# Patient Record
Sex: FEMALE | Race: WHITE | NOT HISPANIC OR LATINO | URBAN - METROPOLITAN AREA
[De-identification: names, ages, dates, MRNs, and addresses within clinical notes are randomized per-mention and may not be internally consistent; named-entity substitution may affect disease eponyms.]

---

## 2017-04-08 ENCOUNTER — INPATIENT (INPATIENT)
Facility: HOSPITAL | Age: 72
LOS: 2 days | Discharge: NURSING, REHAB OR LONG-TERM CARE | End: 2017-04-11
Payer: MEDICARE

## 2017-04-08 PROCEDURE — 73562 X-RAY EXAM OF KNEE 3: CPT | Mod: 26,LT

## 2017-04-08 PROCEDURE — 99285 EMERGENCY DEPT VISIT HI MDM: CPT

## 2017-04-08 PROCEDURE — 73552 X-RAY EXAM OF FEMUR 2/>: CPT | Mod: 26,LT

## 2017-04-08 PROCEDURE — 72170 X-RAY EXAM OF PELVIS: CPT | Mod: 26

## 2017-04-08 PROCEDURE — 73503 X-RAY EXAM HIP UNI 4/> VIEWS: CPT | Mod: 26,LT

## 2017-04-08 PROCEDURE — 71020: CPT | Mod: 26

## 2017-04-09 ENCOUNTER — OUTPATIENT (OUTPATIENT)
Dept: OUTPATIENT SERVICES | Facility: HOSPITAL | Age: 72
LOS: 1 days | End: 2017-04-09

## 2017-04-09 PROCEDURE — 73501 X-RAY EXAM HIP UNI 1 VIEW: CPT | Mod: 26,LT

## 2017-04-09 PROCEDURE — 99223 1ST HOSP IP/OBS HIGH 75: CPT

## 2017-04-10 ENCOUNTER — OUTPATIENT (OUTPATIENT)
Dept: OUTPATIENT SERVICES | Facility: HOSPITAL | Age: 72
LOS: 1 days | End: 2017-04-10

## 2017-04-10 PROCEDURE — 99232 SBSQ HOSP IP/OBS MODERATE 35: CPT

## 2017-04-11 ENCOUNTER — OUTPATIENT (OUTPATIENT)
Dept: OUTPATIENT SERVICES | Facility: HOSPITAL | Age: 72
LOS: 1 days | End: 2017-04-11

## 2017-04-11 ENCOUNTER — INPATIENT (INPATIENT)
Facility: HOSPITAL | Age: 72
LOS: 28 days | Discharge: ANOTHER HOSPITAL | End: 2017-05-10
Payer: MEDICARE

## 2017-04-25 PROCEDURE — 73503 X-RAY EXAM HIP UNI 4/> VIEWS: CPT | Mod: 26,LT

## 2017-05-06 PROCEDURE — 73701 CT LOWER EXTREMITY W/DYE: CPT | Mod: 26,LT

## 2017-05-10 ENCOUNTER — INPATIENT (INPATIENT)
Facility: HOSPITAL | Age: 72
LOS: 1 days | Discharge: NURSING, REHAB OR LONG-TERM CARE | End: 2017-05-12
Payer: MEDICARE

## 2017-05-12 ENCOUNTER — INPATIENT (INPATIENT)
Facility: HOSPITAL | Age: 72
LOS: 34 days | Discharge: HOME | End: 2017-06-16
Payer: MEDICARE

## 2017-05-12 PROCEDURE — 36569 INSJ PICC 5 YR+ W/O IMAGING: CPT

## 2017-05-12 PROCEDURE — 76937 US GUIDE VASCULAR ACCESS: CPT | Mod: 26

## 2017-05-12 PROCEDURE — 77001 FLUOROGUIDE FOR VEIN DEVICE: CPT | Mod: 26

## 2017-05-30 PROCEDURE — 73700 CT LOWER EXTREMITY W/O DYE: CPT | Mod: 26,LT

## 2017-06-02 PROCEDURE — 93971 EXTREMITY STUDY: CPT | Mod: 26,LT

## 2017-06-07 PROCEDURE — 73503 X-RAY EXAM HIP UNI 4/> VIEWS: CPT | Mod: 26,LT

## 2017-06-09 ENCOUNTER — OUTPATIENT (OUTPATIENT)
Dept: OUTPATIENT SERVICES | Facility: HOSPITAL | Age: 72
LOS: 1 days | End: 2017-06-09
Payer: MEDICARE

## 2017-06-09 PROCEDURE — 36569 INSJ PICC 5 YR+ W/O IMAGING: CPT

## 2017-06-09 PROCEDURE — 76937 US GUIDE VASCULAR ACCESS: CPT | Mod: 26

## 2017-06-15 PROCEDURE — 76937 US GUIDE VASCULAR ACCESS: CPT | Mod: 26

## 2017-06-15 PROCEDURE — 36569 INSJ PICC 5 YR+ W/O IMAGING: CPT

## 2017-07-06 ENCOUNTER — APPOINTMENT (OUTPATIENT)
Dept: CARDIOLOGY | Facility: CLINIC | Age: 72
End: 2017-07-06

## 2017-07-14 ENCOUNTER — EMERGENCY (EMERGENCY)
Facility: HOSPITAL | Age: 72
LOS: 1 days | End: 2017-07-14
Payer: MEDICARE

## 2017-07-14 PROCEDURE — 99284 EMERGENCY DEPT VISIT MOD MDM: CPT

## 2017-07-14 PROCEDURE — 76830 TRANSVAGINAL US NON-OB: CPT | Mod: 26

## 2017-07-14 PROCEDURE — 76856 US EXAM PELVIC COMPLETE: CPT | Mod: 26

## 2018-07-31 ENCOUNTER — EMERGENCY (EMERGENCY)
Facility: HOSPITAL | Age: 73
LOS: 1 days | End: 2018-07-31
Payer: MEDICARE

## 2018-07-31 PROCEDURE — 73140 X-RAY EXAM OF FINGER(S): CPT | Mod: 26,RT

## 2018-07-31 PROCEDURE — 99283 EMERGENCY DEPT VISIT LOW MDM: CPT

## 2018-08-23 ENCOUNTER — OUTPATIENT (OUTPATIENT)
Dept: OUTPATIENT SERVICES | Facility: HOSPITAL | Age: 73
LOS: 1 days | End: 2018-08-23

## 2020-08-17 ENCOUNTER — APPOINTMENT (OUTPATIENT)
Dept: RADIOLOGY | Facility: CLINIC | Age: 75
End: 2020-08-17

## 2020-08-17 ENCOUNTER — APPOINTMENT (OUTPATIENT)
Dept: MAMMOGRAPHY | Facility: CLINIC | Age: 75
End: 2020-08-17
Payer: MEDICARE

## 2020-08-17 PROCEDURE — 77067 SCR MAMMO BI INCL CAD: CPT

## 2020-08-17 PROCEDURE — 77080 DXA BONE DENSITY AXIAL: CPT

## 2020-08-17 PROCEDURE — 77063 BREAST TOMOSYNTHESIS BI: CPT

## 2020-08-17 PROCEDURE — 72100 X-RAY EXAM L-S SPINE 2/3 VWS: CPT

## 2020-08-20 ENCOUNTER — TRANSCRIPTION ENCOUNTER (OUTPATIENT)
Age: 75
End: 2020-08-20

## 2021-06-09 ENCOUNTER — APPOINTMENT (OUTPATIENT)
Dept: RADIOLOGY | Facility: CLINIC | Age: 76
End: 2021-06-09
Payer: MEDICARE

## 2021-06-09 PROCEDURE — 71046 X-RAY EXAM CHEST 2 VIEWS: CPT

## 2021-10-04 ENCOUNTER — APPOINTMENT (OUTPATIENT)
Dept: OPHTHALMOLOGY | Facility: CLINIC | Age: 76
End: 2021-10-04
Payer: MEDICARE

## 2021-10-04 ENCOUNTER — NON-APPOINTMENT (OUTPATIENT)
Age: 76
End: 2021-10-04

## 2021-10-04 PROCEDURE — 92014 COMPRE OPH EXAM EST PT 1/>: CPT

## 2022-05-20 ENCOUNTER — APPOINTMENT (OUTPATIENT)
Dept: MAMMOGRAPHY | Facility: CLINIC | Age: 77
End: 2022-05-20
Payer: MEDICARE

## 2022-05-20 ENCOUNTER — APPOINTMENT (OUTPATIENT)
Dept: MRI IMAGING | Facility: CLINIC | Age: 77
End: 2022-05-20
Payer: MEDICARE

## 2022-05-20 PROCEDURE — 77067 SCR MAMMO BI INCL CAD: CPT

## 2022-05-20 PROCEDURE — 77063 BREAST TOMOSYNTHESIS BI: CPT

## 2022-05-20 PROCEDURE — 72148 MRI LUMBAR SPINE W/O DYE: CPT | Mod: MH

## 2022-09-19 ENCOUNTER — APPOINTMENT (OUTPATIENT)
Dept: ULTRASOUND IMAGING | Facility: CLINIC | Age: 77
End: 2022-09-19

## 2022-09-19 PROCEDURE — 76770 US EXAM ABDO BACK WALL COMP: CPT

## 2022-10-03 ENCOUNTER — APPOINTMENT (OUTPATIENT)
Dept: OPHTHALMOLOGY | Facility: CLINIC | Age: 77
End: 2022-10-03

## 2022-10-28 ENCOUNTER — APPOINTMENT (OUTPATIENT)
Dept: OPHTHALMOLOGY | Facility: CLINIC | Age: 77
End: 2022-10-28

## 2022-10-28 ENCOUNTER — NON-APPOINTMENT (OUTPATIENT)
Age: 77
End: 2022-10-28

## 2022-10-28 PROCEDURE — 92014 COMPRE OPH EXAM EST PT 1/>: CPT

## 2023-01-25 ENCOUNTER — INPATIENT (INPATIENT)
Facility: HOSPITAL | Age: 78
LOS: 11 days | Discharge: NURSING, REHAB OR LONG-TERM CARE | DRG: 377 | End: 2023-02-06
Attending: STUDENT IN AN ORGANIZED HEALTH CARE EDUCATION/TRAINING PROGRAM | Admitting: INTERNAL MEDICINE
Payer: MEDICARE

## 2023-01-25 VITALS
WEIGHT: 204.37 LBS | OXYGEN SATURATION: 98 % | DIASTOLIC BLOOD PRESSURE: 68 MMHG | HEART RATE: 70 BPM | RESPIRATION RATE: 30 BRPM | HEIGHT: 68 IN | SYSTOLIC BLOOD PRESSURE: 150 MMHG

## 2023-01-25 DIAGNOSIS — K92.2 GASTROINTESTINAL HEMORRHAGE, UNSPECIFIED: ICD-10-CM

## 2023-01-25 LAB
ALBUMIN SERPL ELPH-MCNC: 3.3 G/DL — SIGNIFICANT CHANGE UP (ref 3.3–5.2)
ALP SERPL-CCNC: 74 U/L — SIGNIFICANT CHANGE UP (ref 40–120)
ALT FLD-CCNC: 11 U/L — SIGNIFICANT CHANGE UP
ANION GAP SERPL CALC-SCNC: 11 MMOL/L — SIGNIFICANT CHANGE UP (ref 5–17)
APTT BLD: 24.3 SEC — LOW (ref 27.5–35.5)
AST SERPL-CCNC: 23 U/L — SIGNIFICANT CHANGE UP
BILIRUB SERPL-MCNC: 0.7 MG/DL — SIGNIFICANT CHANGE UP (ref 0.4–2)
BLD GP AB SCN SERPL QL: SIGNIFICANT CHANGE UP
BUN SERPL-MCNC: 34.5 MG/DL — HIGH (ref 8–20)
CALCIUM SERPL-MCNC: 8.3 MG/DL — LOW (ref 8.4–10.5)
CHLORIDE SERPL-SCNC: 112 MMOL/L — HIGH (ref 96–108)
CO2 SERPL-SCNC: 17 MMOL/L — LOW (ref 22–29)
CREAT SERPL-MCNC: 0.96 MG/DL — SIGNIFICANT CHANGE UP (ref 0.5–1.3)
EGFR: 61 ML/MIN/1.73M2 — SIGNIFICANT CHANGE UP
GAS PNL BLDA: SIGNIFICANT CHANGE UP
GLUCOSE BLDC GLUCOMTR-MCNC: 115 MG/DL — HIGH (ref 70–99)
GLUCOSE SERPL-MCNC: 137 MG/DL — HIGH (ref 70–99)
HCT VFR BLD CALC: 40.5 % — SIGNIFICANT CHANGE UP (ref 34.5–45)
HCT VFR BLD CALC: 42 % — SIGNIFICANT CHANGE UP (ref 34.5–45)
HGB BLD-MCNC: 13.4 G/DL — SIGNIFICANT CHANGE UP (ref 11.5–15.5)
HGB BLD-MCNC: 13.7 G/DL — SIGNIFICANT CHANGE UP (ref 11.5–15.5)
INR BLD: 1.01 RATIO — SIGNIFICANT CHANGE UP (ref 0.88–1.16)
MAGNESIUM SERPL-MCNC: 1.9 MG/DL — SIGNIFICANT CHANGE UP (ref 1.8–2.6)
MCHC RBC-ENTMCNC: 28.9 PG — SIGNIFICANT CHANGE UP (ref 27–34)
MCHC RBC-ENTMCNC: 29.3 PG — SIGNIFICANT CHANGE UP (ref 27–34)
MCHC RBC-ENTMCNC: 32.6 GM/DL — SIGNIFICANT CHANGE UP (ref 32–36)
MCHC RBC-ENTMCNC: 33.1 GM/DL — SIGNIFICANT CHANGE UP (ref 32–36)
MCV RBC AUTO: 88.4 FL — SIGNIFICANT CHANGE UP (ref 80–100)
MCV RBC AUTO: 88.6 FL — SIGNIFICANT CHANGE UP (ref 80–100)
MRSA PCR RESULT.: SIGNIFICANT CHANGE UP
PHOSPHATE SERPL-MCNC: 3.5 MG/DL — SIGNIFICANT CHANGE UP (ref 2.4–4.7)
PLATELET # BLD AUTO: 156 K/UL — SIGNIFICANT CHANGE UP (ref 150–400)
PLATELET # BLD AUTO: 191 K/UL — SIGNIFICANT CHANGE UP (ref 150–400)
POTASSIUM SERPL-MCNC: 5.1 MMOL/L — SIGNIFICANT CHANGE UP (ref 3.5–5.3)
POTASSIUM SERPL-SCNC: 5.1 MMOL/L — SIGNIFICANT CHANGE UP (ref 3.5–5.3)
PROT SERPL-MCNC: 5.7 G/DL — LOW (ref 6.6–8.7)
PROTHROM AB SERPL-ACNC: 11.7 SEC — SIGNIFICANT CHANGE UP (ref 10.5–13.4)
RBC # BLD: 4.58 M/UL — SIGNIFICANT CHANGE UP (ref 3.8–5.2)
RBC # BLD: 4.74 M/UL — SIGNIFICANT CHANGE UP (ref 3.8–5.2)
RBC # FLD: 15.1 % — HIGH (ref 10.3–14.5)
RBC # FLD: 15.8 % — HIGH (ref 10.3–14.5)
S AUREUS DNA NOSE QL NAA+PROBE: DETECTED
SODIUM SERPL-SCNC: 140 MMOL/L — SIGNIFICANT CHANGE UP (ref 135–145)
WBC # BLD: 12.54 K/UL — HIGH (ref 3.8–10.5)
WBC # BLD: 9.77 K/UL — SIGNIFICANT CHANGE UP (ref 3.8–10.5)
WBC # FLD AUTO: 12.54 K/UL — HIGH (ref 3.8–10.5)
WBC # FLD AUTO: 9.77 K/UL — SIGNIFICANT CHANGE UP (ref 3.8–10.5)

## 2023-01-25 PROCEDURE — 93010 ELECTROCARDIOGRAM REPORT: CPT

## 2023-01-25 PROCEDURE — 71045 X-RAY EXAM CHEST 1 VIEW: CPT | Mod: 26

## 2023-01-25 PROCEDURE — 88305 TISSUE EXAM BY PATHOLOGIST: CPT | Mod: 26

## 2023-01-25 PROCEDURE — 88312 SPECIAL STAINS GROUP 1: CPT | Mod: 26

## 2023-01-25 PROCEDURE — 74174 CTA ABD&PLVS W/CONTRAST: CPT | Mod: 26

## 2023-01-25 RX ORDER — ALBUTEROL 90 UG/1
2 AEROSOL, METERED ORAL EVERY 6 HOURS
Refills: 0 | Status: DISCONTINUED | OUTPATIENT
Start: 2023-01-25 | End: 2023-02-06

## 2023-01-25 RX ORDER — PIPERACILLIN AND TAZOBACTAM 4; .5 G/20ML; G/20ML
3.38 INJECTION, POWDER, LYOPHILIZED, FOR SOLUTION INTRAVENOUS EVERY 8 HOURS
Refills: 0 | Status: DISCONTINUED | OUTPATIENT
Start: 2023-01-26 | End: 2023-01-30

## 2023-01-25 RX ORDER — PREGABALIN 225 MG/1
1000 CAPSULE ORAL DAILY
Refills: 0 | Status: DISCONTINUED | OUTPATIENT
Start: 2023-01-25 | End: 2023-01-25

## 2023-01-25 RX ORDER — PHENYLEPHRINE HYDROCHLORIDE 10 MG/ML
0.3 INJECTION INTRAVENOUS
Qty: 40 | Refills: 0 | Status: DISCONTINUED | OUTPATIENT
Start: 2023-01-25 | End: 2023-01-26

## 2023-01-25 RX ORDER — PREGABALIN 225 MG/1
1000 CAPSULE ORAL DAILY
Refills: 0 | Status: DISCONTINUED | OUTPATIENT
Start: 2023-01-25 | End: 2023-01-26

## 2023-01-25 RX ORDER — ONDANSETRON 8 MG/1
4 TABLET, FILM COATED ORAL EVERY 4 HOURS
Refills: 0 | Status: DISCONTINUED | OUTPATIENT
Start: 2023-01-25 | End: 2023-02-06

## 2023-01-25 RX ORDER — CHLORHEXIDINE GLUCONATE 213 G/1000ML
15 SOLUTION TOPICAL EVERY 12 HOURS
Refills: 0 | Status: DISCONTINUED | OUTPATIENT
Start: 2023-01-25 | End: 2023-01-26

## 2023-01-25 RX ORDER — PIPERACILLIN AND TAZOBACTAM 4; .5 G/20ML; G/20ML
3.38 INJECTION, POWDER, LYOPHILIZED, FOR SOLUTION INTRAVENOUS ONCE
Refills: 0 | Status: COMPLETED | OUTPATIENT
Start: 2023-01-25 | End: 2023-01-25

## 2023-01-25 RX ORDER — PANTOPRAZOLE SODIUM 20 MG/1
8 TABLET, DELAYED RELEASE ORAL
Qty: 80 | Refills: 0 | Status: DISCONTINUED | OUTPATIENT
Start: 2023-01-25 | End: 2023-01-26

## 2023-01-25 RX ORDER — CHOLECALCIFEROL (VITAMIN D3) 125 MCG
5000 CAPSULE ORAL DAILY
Refills: 0 | Status: DISCONTINUED | OUTPATIENT
Start: 2023-01-25 | End: 2023-01-25

## 2023-01-25 RX ORDER — MIDAZOLAM HYDROCHLORIDE 1 MG/ML
2 INJECTION, SOLUTION INTRAMUSCULAR; INTRAVENOUS
Refills: 0 | Status: DISCONTINUED | OUTPATIENT
Start: 2023-01-25 | End: 2023-01-26

## 2023-01-25 RX ORDER — ATORVASTATIN CALCIUM 80 MG/1
40 TABLET, FILM COATED ORAL AT BEDTIME
Refills: 0 | Status: DISCONTINUED | OUTPATIENT
Start: 2023-01-25 | End: 2023-01-26

## 2023-01-25 RX ORDER — PROPOFOL 10 MG/ML
40 INJECTION, EMULSION INTRAVENOUS
Qty: 1000 | Refills: 0 | Status: DISCONTINUED | OUTPATIENT
Start: 2023-01-25 | End: 2023-01-26

## 2023-01-25 RX ORDER — DEXTROSE 50 % IN WATER 50 %
15 SYRINGE (ML) INTRAVENOUS ONCE
Refills: 0 | Status: DISCONTINUED | OUTPATIENT
Start: 2023-01-25 | End: 2023-02-06

## 2023-01-25 RX ORDER — INSULIN LISPRO 100/ML
VIAL (ML) SUBCUTANEOUS
Refills: 0 | Status: DISCONTINUED | OUTPATIENT
Start: 2023-01-25 | End: 2023-02-06

## 2023-01-25 RX ORDER — DEXTROSE 50 % IN WATER 50 %
25 SYRINGE (ML) INTRAVENOUS ONCE
Refills: 0 | Status: DISCONTINUED | OUTPATIENT
Start: 2023-01-25 | End: 2023-02-06

## 2023-01-25 RX ORDER — SODIUM CHLORIDE 9 MG/ML
1000 INJECTION, SOLUTION INTRAVENOUS
Refills: 0 | Status: DISCONTINUED | OUTPATIENT
Start: 2023-01-25 | End: 2023-02-06

## 2023-01-25 RX ORDER — ALLOPURINOL 300 MG
100 TABLET ORAL DAILY
Refills: 0 | Status: DISCONTINUED | OUTPATIENT
Start: 2023-01-25 | End: 2023-01-26

## 2023-01-25 RX ORDER — CLOTRIMAZOLE AND BETAMETHASONE DIPROPIONATE 10; .5 MG/G; MG/G
1 CREAM TOPICAL
Refills: 0 | Status: DISCONTINUED | OUTPATIENT
Start: 2023-01-25 | End: 2023-02-06

## 2023-01-25 RX ORDER — NOREPINEPHRINE BITARTRATE/D5W 8 MG/250ML
0.05 PLASTIC BAG, INJECTION (ML) INTRAVENOUS
Qty: 8 | Refills: 0 | Status: DISCONTINUED | OUTPATIENT
Start: 2023-01-25 | End: 2023-01-26

## 2023-01-25 RX ORDER — MONTELUKAST 4 MG/1
10 TABLET, CHEWABLE ORAL DAILY
Refills: 0 | Status: DISCONTINUED | OUTPATIENT
Start: 2023-01-25 | End: 2023-01-26

## 2023-01-25 RX ORDER — CHOLECALCIFEROL (VITAMIN D3) 125 MCG
2000 CAPSULE ORAL DAILY
Refills: 0 | Status: DISCONTINUED | OUTPATIENT
Start: 2023-01-25 | End: 2023-01-26

## 2023-01-25 RX ORDER — DEXTROSE 50 % IN WATER 50 %
12.5 SYRINGE (ML) INTRAVENOUS ONCE
Refills: 0 | Status: DISCONTINUED | OUTPATIENT
Start: 2023-01-25 | End: 2023-02-06

## 2023-01-25 RX ORDER — DEXMEDETOMIDINE HYDROCHLORIDE IN 0.9% SODIUM CHLORIDE 4 UG/ML
0.3 INJECTION INTRAVENOUS
Qty: 200 | Refills: 0 | Status: DISCONTINUED | OUTPATIENT
Start: 2023-01-25 | End: 2023-01-26

## 2023-01-25 RX ORDER — FENTANYL CITRATE 50 UG/ML
50 INJECTION INTRAVENOUS
Refills: 0 | Status: DISCONTINUED | OUTPATIENT
Start: 2023-01-25 | End: 2023-01-26

## 2023-01-25 RX ORDER — CHLORHEXIDINE GLUCONATE 213 G/1000ML
1 SOLUTION TOPICAL
Refills: 0 | Status: DISCONTINUED | OUTPATIENT
Start: 2023-01-25 | End: 2023-02-06

## 2023-01-25 RX ORDER — GLUCAGON INJECTION, SOLUTION 0.5 MG/.1ML
1 INJECTION, SOLUTION SUBCUTANEOUS ONCE
Refills: 0 | Status: DISCONTINUED | OUTPATIENT
Start: 2023-01-25 | End: 2023-02-06

## 2023-01-25 RX ORDER — LEVOTHYROXINE SODIUM 125 MCG
125 TABLET ORAL DAILY
Refills: 0 | Status: DISCONTINUED | OUTPATIENT
Start: 2023-01-25 | End: 2023-01-26

## 2023-01-25 RX ADMIN — PROPOFOL 24 MICROGRAM(S)/KG/MIN: 10 INJECTION, EMULSION INTRAVENOUS at 18:03

## 2023-01-25 RX ADMIN — Medication 100 MILLIGRAM(S): at 18:03

## 2023-01-25 RX ADMIN — PANTOPRAZOLE SODIUM 10 MG/HR: 20 TABLET, DELAYED RELEASE ORAL at 21:29

## 2023-01-25 RX ADMIN — PROPOFOL 24 MICROGRAM(S)/KG/MIN: 10 INJECTION, EMULSION INTRAVENOUS at 14:32

## 2023-01-25 RX ADMIN — PREGABALIN 1000 MICROGRAM(S): 225 CAPSULE ORAL at 18:03

## 2023-01-25 RX ADMIN — FENTANYL CITRATE 50 MICROGRAM(S): 50 INJECTION INTRAVENOUS at 14:57

## 2023-01-25 RX ADMIN — DEXMEDETOMIDINE HYDROCHLORIDE IN 0.9% SODIUM CHLORIDE 7.5 MICROGRAM(S)/KG/HR: 4 INJECTION INTRAVENOUS at 21:52

## 2023-01-25 RX ADMIN — PANTOPRAZOLE SODIUM 10 MG/HR: 20 TABLET, DELAYED RELEASE ORAL at 15:35

## 2023-01-25 RX ADMIN — Medication 2000 UNIT(S): at 18:33

## 2023-01-25 RX ADMIN — FENTANYL CITRATE 50 MICROGRAM(S): 50 INJECTION INTRAVENOUS at 14:32

## 2023-01-25 RX ADMIN — FENTANYL CITRATE 50 MICROGRAM(S): 50 INJECTION INTRAVENOUS at 02:13

## 2023-01-25 RX ADMIN — ATORVASTATIN CALCIUM 40 MILLIGRAM(S): 80 TABLET, FILM COATED ORAL at 21:29

## 2023-01-25 RX ADMIN — MONTELUKAST 10 MILLIGRAM(S): 4 TABLET, CHEWABLE ORAL at 18:03

## 2023-01-25 RX ADMIN — MIDAZOLAM HYDROCHLORIDE 2 MILLIGRAM(S): 1 INJECTION, SOLUTION INTRAMUSCULAR; INTRAVENOUS at 14:31

## 2023-01-25 RX ADMIN — PIPERACILLIN AND TAZOBACTAM 200 GRAM(S): 4; .5 INJECTION, POWDER, LYOPHILIZED, FOR SOLUTION INTRAVENOUS at 18:33

## 2023-01-25 RX ADMIN — PIPERACILLIN AND TAZOBACTAM 25 GRAM(S): 4; .5 INJECTION, POWDER, LYOPHILIZED, FOR SOLUTION INTRAVENOUS at 21:32

## 2023-01-25 RX ADMIN — Medication 125 MICROGRAM(S): at 18:03

## 2023-01-25 RX ADMIN — PROPOFOL 24 MICROGRAM(S)/KG/MIN: 10 INJECTION, EMULSION INTRAVENOUS at 22:39

## 2023-01-25 RX ADMIN — PHENYLEPHRINE HYDROCHLORIDE 11.3 MICROGRAM(S)/KG/MIN: 10 INJECTION INTRAVENOUS at 14:32

## 2023-01-25 RX ADMIN — FENTANYL CITRATE 50 MICROGRAM(S): 50 INJECTION INTRAVENOUS at 21:29

## 2023-01-25 RX ADMIN — CHLORHEXIDINE GLUCONATE 1 APPLICATION(S): 213 SOLUTION TOPICAL at 14:34

## 2023-01-25 RX ADMIN — CHLORHEXIDINE GLUCONATE 15 MILLILITER(S): 213 SOLUTION TOPICAL at 18:04

## 2023-01-25 NOTE — PATIENT PROFILE ADULT - FALL HARM RISK - HARM RISK INTERVENTIONS

## 2023-01-25 NOTE — H&P ADULT - NSHPPHYSICALEXAM_GEN_ALL_CORE
Constitutional - Obese female, intubated, sedated, NGT+, dominguez catheter+  HEENT - AT, NC, PERRL  CV - S1, S2, no gallops or rubs   Pulm - CTAB, no rhonchi or rales  GI - Obese, soft, nondistended, no rebound or guarding   Ext - Nontender, mild edema   Skin - Warm, pink, diffuse erythematous patches on abdomen, pannus, and B/L LEs   Neuro - Sedated on propofol gtt

## 2023-01-25 NOTE — H&P ADULT - HISTORY OF PRESENT ILLNESS
76 y/o female, FULL CODE, w/PMHx of Asthma, HTN, HLD, hypothyroid, DM, transferred from [facility] ICU for ABLA 2/2 LGIB. Patient noted to have multiple large episodes of hematochezia while in [facility] ED with drop in Hgb 8.4 <--13.9. Patient intubated for EGD at [facility] and found to have gastric ulcers not actively bleeding. Patient transferred to [facility] MICU for CTA Abd/Pel and possible IR embolization. Patient on full vent support, courtney gtt for BP support, Protonix gtt, and propofol gtt with IVP PRN fentanyl and versed for sedation/vent synchrony.  76 y/o female, FULL CODE, w/PMHx of Asthma, HTN, HLD, hypothyroid, DM, transferred [place] [facility] [place] for ABLA 2/2 LGIB. Patient presented complaining of several episodes of BRBPR and noted to have multiple large episodes of hematochezia while in [facility] ED with drop in Hgb 8.4 <--13.9. Patient admitted to ICU for further evaluation and received PRBC x 6, Platelets x 2, FFP x 2, and cryoprecipitate. Patient intubated for EGD at [facility] and found to have gastric ulcers not actively bleeding. Patient transferred to [facility] MICU for CTA Abd/Pel and possible IR embolization. Patient on full vent support, courtney gtt for BP support, Protonix gtt, and propofol gtt with IVP PRN fentanyl and versed for sedation/vent synchrony.

## 2023-01-25 NOTE — H&P ADULT - ASSESSMENT
Impression - 78 y/o female, FULL CODE, w/PMHx of Asthma, HTN, HLD, hypothyroid, DM, transferred from [facility] ICU for ABLA 2/2 LGIB. Patient presented complaining of several episodes of BRBPR and noted to have multiple large episodes of hematochezia while in [facility] ED with drop in Hgb 8.4 <--13.9. Patient admitted to ICU for further evaluation and received PRBC x 6, Platelets x 2, FFP x 2, and cryoprecipitate. Patient intubated for EGD at [facility] and found to have gastric ulcers not actively bleeding. Patient transferred to [facility] MICU for CTA Abd/Pel and possible IR embolization. Patient on full vent support, deni gtt for BP support, Protonix gtt, and propofol gtt with IVP PRN fentanyl and versed for sedation/vent synchrony.    Hemorrhagic shock   ABLA 2/2 LGIB  CORKY    Neuro - Propofol gtt, titrate to goal RASS 0/-1  CV - Actively titrating Deni gtt for goal MAP>65  Pulm - Full vent support, will maintain 6cc/kg of IBW, actively titrating FIO2 for SPO2>90%, full vent bundle, trend abg, CXR pending   GI - NPO, Protonix gtt, bloody stools noted on arrival, [facility] EGD +gastric ulcers, but no active source identified, CTA Abd/Pel pending, possible IR embolization    Renal -  Impression - 78 y/o female, FULL CODE, w/PMHx of Asthma, HTN, HLD, hypothyroid, DM, [place] [facility] [place] ABLA 2/2 LGIB. Patient presented complaining of several episodes of BRBPR and noted to have multiple large episodes of hematochezia while in [facility] [place] with drop in Hgb 8.4 <--13.9. Patient admitted to ICU for further evaluation and received PRBC x 6, Platelets x 2, FFP x 2, and cryoprecipitate. Patient intubated for [place]GD [place] [facility] and found to have gastric ulcers not actively bleeding. Patient transferred to [facility] MICU for CTA Abd/Pel and possible IR embolization. Patient on full vent support, deni gtt for BP support, Protonix gtt, and propofol gtt with IVP PRN fentanyl and versed for sedation/vent synchrony.    Hemorrhagic shock   ABLA 2/2 LGIB  CORKY    Neuro - Propofol gtt, titrate to goal RASS 0/-1  CV - Actively titrating Deni gtt for goal MAP>65  Pulm - Full vent support, will maintain 6cc/kg of IBW, actively titrating FIO2 for SPO2>90%, full vent bundle, trend abg, CXR pending, PRN albuterol   GI - NPO, Protonix gtt, bloody stools noted on arrival, [facility] [place] +gastric ulcers, but no active source identified, CTA Abd/Pel pending, possible IR embolization    Renal - Prerenal azotemia, dominguez catheter, strict I/Os, trend BUN/CTN, trend lytes, replete as needed, avoid nephrotoxic agents, renal dose meds  Heme - ABLA 2/2 LGIB, CTA Abd/Pel pending, s/p PRBCx6, Plateletsx2, FFPx2, PTA, trend cbc, transfuse as needed Impression - 76 y/o female, FULL CODE, w/PMHx of Asthma, HTN, HLD, hypothyroid, DM, transferred from [facility] [place] for ABLA 2/2 LGIB. Patient presented complaining of several episodes of BRBPR and noted to have multiple large episodes of hematochezia while in [facility] ED with drop in Hgb 8.4 <--13.9. Patient admitted to ICU for further evaluation and received PRBC x 6, Platelets x 2, FFP x 2, and cryoprecipitate. Patient intubated for EGD at [facility] and found to have gastric ulcers not actively bleeding. Patient transferred to [facility] MICU for CTA Abd/Pel and possible IR embolization. Patient on full vent support, deni gtt for BP support, Protonix gtt, and propofol gtt with IVP PRN fentanyl and versed for sedation/vent synchrony.    Hemorrhagic shock   ABLA 2/2 LGIB  CORKY    Neuro - Propofol gtt, titrate to goal RASS 0/-1  CV - Actively titrating Deni gtt for goal MAP>65, sinus amalia to high 40 possibly 2/2 to additional sedation given for vent synchrony will transition to Levo gtt if bradycardia persists  Pulm - Full vent support, will maintain 6cc/kg of IBW, actively titrating FIO2 for SPO2>90%, full vent bundle, trend abg, CXR pending, PRN albuterol   GI - NPO, Protonix gtt, bloody stools noted on arrival, [facility] EGD +gastric ulcers, but no active source identified, CTA Abd/Pel pending, possible IR embolization    Renal - Prerenal azotemia, dominguez catheter, strict I/Os, trend BUN/CTN, trend lytes, replete as needed, avoid nephrotoxic agents, renal dose meds  Heme - ABLA 2/2 LGIB, CTA Abd/Pel pending, s/p PRBCx6, Plateletsx2, FFPx2, PTA, trend cbc, transfuse as needed  Endocrine - ISS protocol, BG >180, A1c in AM, TSH in AM, keep euthyroid, continue daily synthroid   ID - Afebrile, trend leukocytosis, continue to monitor for signs of active infection       Critical Care time: 45 mins assessing presenting problems of acute illness that poses high probability of life threatening deterioration or end organ damage/dysfunction.  Medical decision making including initiating plan of care, reviewing data, reviewing radiology, direct patient bedside evaluation and interpretation of vital signs, any necessary ventilator management, discussion with multidisciplinary team, discussing goals of care with patient/family, all non inclusive of procedures Impression - 76 y/o female, FULL CODE, w/PMHx of Asthma, HTN, HLD, UC, hypothyroid, DM, transferred from [facility] ICU for ABLA 2/2 LGIB. Patient presented complaining of several episodes of BRBPR and noted to have multiple large episodes of hematochezia while in [facility] ED with drop in Hgb 8.4 <--13.9. Patient admitted to ICU for further evaluation and received PRBC x 6, Platelets x 2, FFP x 2, and cryoprecipitate. Patient intubated for EGD at [facility] and found to have gastric ulcers not actively bleeding. Patient transferred to [facility] MICU for CTA Abd/Pel and possible IR embolization. Patient on full vent support, deni gtt for BP support, Protonix gtt, and propofol gtt with IVP PRN fentanyl and versed for sedation/vent synchrony.    Hemorrhagic shock   ABLA 2/2 LGIB  CORKY    Neuro - Propofol gtt, titrate to goal RASS 0/-1  CV - Actively titrating Deni gtt for goal MAP>65, sinus amalia to high 40 possibly 2/2 to additional sedation given for vent synchrony will transition to Levo gtt if bradycardia persists  Pulm - Full vent support, will maintain 6cc/kg of IBW, actively titrating FIO2 for SPO2>90%, full vent bundle, trend abg, CXR pending, PRN albuterol   GI - NPO, Protonix gtt, bloody stools noted on arrival, [facility] EGD +gastric ulcers, but no active source identified, CTA Abd/Pel pending, possible IR embolization    Renal - Prerenal azotemia, dominguez catheter, strict I/Os, trend BUN/CTN, trend lytes, replete as needed, avoid nephrotoxic agents, renal dose meds  Heme - ABLA 2/2 LGIB, CTA Abd/Pel pending, s/p PRBCx6, Plateletsx2, FFPx2, PTA, trend cbc, transfuse as needed  Endocrine - ISS protocol, BG >180, A1c in AM, TSH in AM, keep euthyroid, continue daily synthroid   ID - Afebrile, trend leukocytosis, continue to monitor for signs of active infection       Critical Care time: 45 mins assessing presenting problems of acute illness that poses high probability of life threatening deterioration or end organ damage/dysfunction.  Medical decision making including initiating plan of care, reviewing data, reviewing radiology, direct patient bedside evaluation and interpretation of vital signs, any necessary ventilator management, discussion with multidisciplinary team, discussing goals of care with patient/family, all non inclusive of procedures Impression - 78 y/o female, FULL CODE, w/PMHx of Asthma, HTN, HLD, UC, hypothyroid, DM, [place] from [facility] [place] for ABLA 2/2 LGIB. Patient presented complaining of several episodes of BRBPR and noted to have multiple large episodes of hematochezia while in [facility] ED with drop in Hgb 8.4 <--13.9. Patient admitted to ICU for further evaluation and received PRBC x 6, Platelets x 2, FFP x 2, and cryoprecipitate. Patient intubated for EGD at [facility] and found to have gastric ulcers not actively bleeding. Patient transferred to [facility] MICU for CTA Abd/Pel and possible IR embolization. Patient on full vent support, deni gtt for BP support, Protonix gtt, and propofol gtt with IVP PRN fentanyl and versed for sedation/vent synchrony.    Hemorrhagic shock   ABLA 2/2 LGIB  CORKY    Neuro - Propofol gtt, titrate to goal RASS 0/-1  CV - Actively titrating Deni gtt for goal MAP>65, sinus amalia to high 40 possibly 2/2 to additional sedation given for vent synchrony will transition to Levo gtt if bradycardia persists, HOLD Lisinopril 40mgQD, continue statin   Pulm - Full vent support, will maintain 6cc/kg of IBW, actively titrating FIO2 for SPO2>90%, full vent bundle, trend abg, CXR pending, PRN albuterol, daily Singulair    GI - NPO, Protonix gtt, bloody stools noted on arrival, [facility] EGD +gastric ulcers, but no active source identified, CTA Abd/Pel pending, possible IR embolization    Renal - Prerenal azotemia, dominguez catheter, strict I/Os, trend BUN/CTN, trend lytes, replete as needed, avoid nephrotoxic agents, renal dose meds, HOLD Torsemide 20mg QD  Heme - ABLA 2/2 LGIB, CTA Abd/Pel pending, s/p PRBCx6, Plateletsx2, FFPx2, PTA, trend cbc, transfuse as needed, HOLD ASA 81   Endocrine - ISS protocol, BG >180, A1c in AM, TSH in AM, keep euthyroid, continue daily synthroid   ID - Afebrile, trend leukocytosis, continue to monitor for signs of active infection       Critical Care time: 45 mins assessing presenting problems of acute illness that poses high probability of life threatening deterioration or end organ damage/dysfunction.  Medical decision making including initiating plan of care, reviewing data, reviewing radiology, direct patient bedside evaluation and interpretation of vital signs, any necessary ventilator management, discussion with multidisciplinary team, discussing goals of care with patient/family, all non inclusive of procedures Impression - 76 y/o female, FULL CODE, w/PMHx of Asthma, HTN, HLD, UC, hypothyroid, DM, transferred from [facility] ICU for ABLA 2/2 LGIB. Patient presented complaining of several episodes of BRBPR and noted to have multiple large episodes of hematochezia while in [facility] ED with drop in Hgb 8.4 <--13.9. Patient admitted to ICU for further evaluation and received PRBC x 6, Platelets x 2, FFP x 2, and cryoprecipitate. Patient intubated for EGD at [facility] and found to have gastric ulcers not actively bleeding. Patient transferred to [facility] MICU for CTA Abd/Pel and possible IR embolization. Patient on full vent support, deni gtt for BP support, Protonix gtt, and propofol gtt with IVP PRN fentanyl and versed for sedation/vent synchrony.    Hemorrhagic shock   ABLA 2/2 LGIB  CORKY    Neuro - Propofol gtt, titrate to goal RASS 0/-1  CV - Actively titrating Deni gtt for goal MAP>65, sinus amalia to high 40 possibly 2/2 to additional sedation given for vent synchrony will transition to Levo gtt if bradycardia persists, HOLD Lisinopril 40mgQD, continue statin   Pulm - Full vent support, will maintain 6cc/kg of IBW, actively titrating FIO2 for SPO2>90%, full vent bundle, trend abg, daily SAT/SBT, CXR pending, PRN albuterol, daily Singulair    GI - NPO, Protonix gtt, bloody stools noted on arrival, [facility] EGD +gastric ulcers, but no active source identified, CTA Abd/Pel pending, possible IR embolization    Renal - Prerenal azotemia, dominguez catheter, strict I/Os, trend BUN/CTN, trend lytes, replete as needed, avoid nephrotoxic agents, renal dose meds, HOLD Torsemide 20mg QD  Heme - ABLA 2/2 LGIB, CTA Abd/Pel pending, s/p PRBCx6, Plateletsx2, FFPx2, PTA, trend cbc, transfuse as needed, HOLD ASA 81   Endocrine - ISS protocol, BG >180, A1c in AM, TSH in AM, keep euthyroid, continue daily synthroid   ID - Afebrile, trend leukocytosis, continue to monitor for signs of active infection       Critical Care time: 45 mins assessing presenting problems of acute illness that poses high probability of life threatening deterioration or end organ damage/dysfunction.  Medical decision making including initiating plan of care, reviewing data, reviewing radiology, direct patient bedside evaluation and interpretation of vital signs, any necessary ventilator management, discussion with multidisciplinary team, discussing goals of care with patient/family, all non inclusive of procedures

## 2023-01-25 NOTE — H&P ADULT - NS PANP COMMENT GEN_ALL_CORE FT
Agree with above.  CTA abdomen performed, no active bleeding.  Will monitor Hb overnight.  Breathing trial in AM.  Family updated.

## 2023-01-26 DIAGNOSIS — K92.2 GASTROINTESTINAL HEMORRHAGE, UNSPECIFIED: ICD-10-CM

## 2023-01-26 LAB
A1C WITH ESTIMATED AVERAGE GLUCOSE RESULT: 5.2 % — SIGNIFICANT CHANGE UP (ref 4–5.6)
ANION GAP SERPL CALC-SCNC: 11 MMOL/L — SIGNIFICANT CHANGE UP (ref 5–17)
BUN SERPL-MCNC: 22.9 MG/DL — HIGH (ref 8–20)
CALCIUM SERPL-MCNC: 9.1 MG/DL — SIGNIFICANT CHANGE UP (ref 8.4–10.5)
CHLORIDE SERPL-SCNC: 112 MMOL/L — HIGH (ref 96–108)
CO2 SERPL-SCNC: 21 MMOL/L — LOW (ref 22–29)
CREAT SERPL-MCNC: 0.9 MG/DL — SIGNIFICANT CHANGE UP (ref 0.5–1.3)
EGFR: 66 ML/MIN/1.73M2 — SIGNIFICANT CHANGE UP
ESTIMATED AVERAGE GLUCOSE: 103 MG/DL — SIGNIFICANT CHANGE UP (ref 68–114)
GAS PNL BLDA: SIGNIFICANT CHANGE UP
GLUCOSE BLDC GLUCOMTR-MCNC: 106 MG/DL — HIGH (ref 70–99)
GLUCOSE BLDC GLUCOMTR-MCNC: 116 MG/DL — HIGH (ref 70–99)
GLUCOSE BLDC GLUCOMTR-MCNC: 122 MG/DL — HIGH (ref 70–99)
GLUCOSE BLDC GLUCOMTR-MCNC: 127 MG/DL — HIGH (ref 70–99)
GLUCOSE BLDC GLUCOMTR-MCNC: 130 MG/DL — HIGH (ref 70–99)
GLUCOSE SERPL-MCNC: 120 MG/DL — HIGH (ref 70–99)
HCT VFR BLD CALC: 39.1 % — SIGNIFICANT CHANGE UP (ref 34.5–45)
HCV AB S/CO SERPL IA: 0.08 S/CO — SIGNIFICANT CHANGE UP (ref 0–0.99)
HCV AB SERPL-IMP: SIGNIFICANT CHANGE UP
HGB BLD-MCNC: 12.9 G/DL — SIGNIFICANT CHANGE UP (ref 11.5–15.5)
MAGNESIUM SERPL-MCNC: 2.1 MG/DL — SIGNIFICANT CHANGE UP (ref 1.6–2.6)
MCHC RBC-ENTMCNC: 29.1 PG — SIGNIFICANT CHANGE UP (ref 27–34)
MCHC RBC-ENTMCNC: 33 GM/DL — SIGNIFICANT CHANGE UP (ref 32–36)
MCV RBC AUTO: 88.1 FL — SIGNIFICANT CHANGE UP (ref 80–100)
PHOSPHATE SERPL-MCNC: 4.3 MG/DL — SIGNIFICANT CHANGE UP (ref 2.4–4.7)
PLATELET # BLD AUTO: 212 K/UL — SIGNIFICANT CHANGE UP (ref 150–400)
POTASSIUM SERPL-MCNC: 5.1 MMOL/L — SIGNIFICANT CHANGE UP (ref 3.5–5.3)
POTASSIUM SERPL-SCNC: 5.1 MMOL/L — SIGNIFICANT CHANGE UP (ref 3.5–5.3)
RBC # BLD: 4.44 M/UL — SIGNIFICANT CHANGE UP (ref 3.8–5.2)
RBC # FLD: 15.7 % — HIGH (ref 10.3–14.5)
SODIUM SERPL-SCNC: 144 MMOL/L — SIGNIFICANT CHANGE UP (ref 135–145)
TSH SERPL-MCNC: 0.34 UIU/ML — SIGNIFICANT CHANGE UP (ref 0.27–4.2)
WBC # BLD: 8.96 K/UL — SIGNIFICANT CHANGE UP (ref 3.8–10.5)
WBC # FLD AUTO: 8.96 K/UL — SIGNIFICANT CHANGE UP (ref 3.8–10.5)

## 2023-01-26 PROCEDURE — 99233 SBSQ HOSP IP/OBS HIGH 50: CPT

## 2023-01-26 PROCEDURE — 99223 1ST HOSP IP/OBS HIGH 75: CPT

## 2023-01-26 RX ORDER — LEVOTHYROXINE SODIUM 125 MCG
1 TABLET ORAL
Qty: 0 | Refills: 0 | DISCHARGE

## 2023-01-26 RX ORDER — DEXMEDETOMIDINE HYDROCHLORIDE IN 0.9% SODIUM CHLORIDE 4 UG/ML
0.2 INJECTION INTRAVENOUS
Qty: 200 | Refills: 0 | Status: DISCONTINUED | OUTPATIENT
Start: 2023-01-26 | End: 2023-01-26

## 2023-01-26 RX ORDER — LISINOPRIL 2.5 MG/1
1 TABLET ORAL
Qty: 0 | Refills: 0 | DISCHARGE

## 2023-01-26 RX ORDER — PANTOPRAZOLE SODIUM 20 MG/1
40 TABLET, DELAYED RELEASE ORAL
Refills: 0 | Status: DISCONTINUED | OUTPATIENT
Start: 2023-01-26 | End: 2023-01-28

## 2023-01-26 RX ORDER — BALSALAZIDE DISODIUM 750 MG/1
2250 CAPSULE ORAL
Refills: 0 | Status: DISCONTINUED | OUTPATIENT
Start: 2023-01-26 | End: 2023-02-06

## 2023-01-26 RX ORDER — FLUCONAZOLE 150 MG/1
100 TABLET ORAL DAILY
Refills: 0 | Status: COMPLETED | OUTPATIENT
Start: 2023-01-26 | End: 2023-01-31

## 2023-01-26 RX ORDER — LEVOTHYROXINE SODIUM 125 MCG
125 TABLET ORAL DAILY
Refills: 0 | Status: DISCONTINUED | OUTPATIENT
Start: 2023-01-26 | End: 2023-02-06

## 2023-01-26 RX ORDER — LISINOPRIL 2.5 MG/1
20 TABLET ORAL DAILY
Refills: 0 | Status: DISCONTINUED | OUTPATIENT
Start: 2023-01-26 | End: 2023-01-28

## 2023-01-26 RX ORDER — ALLOPURINOL 300 MG
1 TABLET ORAL
Qty: 0 | Refills: 0 | DISCHARGE

## 2023-01-26 RX ORDER — CHOLECALCIFEROL (VITAMIN D3) 125 MCG
2000 CAPSULE ORAL DAILY
Refills: 0 | Status: DISCONTINUED | OUTPATIENT
Start: 2023-01-26 | End: 2023-02-06

## 2023-01-26 RX ORDER — ATORVASTATIN CALCIUM 80 MG/1
40 TABLET, FILM COATED ORAL AT BEDTIME
Refills: 0 | Status: DISCONTINUED | OUTPATIENT
Start: 2023-01-26 | End: 2023-02-06

## 2023-01-26 RX ORDER — FLUCONAZOLE 150 MG/1
1 TABLET ORAL
Qty: 0 | Refills: 0 | DISCHARGE

## 2023-01-26 RX ORDER — ALBUTEROL 90 UG/1
2 AEROSOL, METERED ORAL
Qty: 0 | Refills: 0 | DISCHARGE

## 2023-01-26 RX ORDER — BALSALAZIDE DISODIUM 750 MG/1
3 CAPSULE ORAL
Qty: 0 | Refills: 0 | DISCHARGE

## 2023-01-26 RX ORDER — OXYCODONE AND ACETAMINOPHEN 5; 325 MG/1; MG/1
2 TABLET ORAL EVERY 6 HOURS
Refills: 0 | Status: DISCONTINUED | OUTPATIENT
Start: 2023-01-26 | End: 2023-01-26

## 2023-01-26 RX ORDER — EMPAGLIFLOZIN 10 MG/1
1 TABLET, FILM COATED ORAL
Qty: 0 | Refills: 0 | DISCHARGE

## 2023-01-26 RX ORDER — MONTELUKAST 4 MG/1
10 TABLET, CHEWABLE ORAL DAILY
Refills: 0 | Status: DISCONTINUED | OUTPATIENT
Start: 2023-01-26 | End: 2023-02-06

## 2023-01-26 RX ORDER — ALLOPURINOL 300 MG
100 TABLET ORAL DAILY
Refills: 0 | Status: DISCONTINUED | OUTPATIENT
Start: 2023-01-26 | End: 2023-02-06

## 2023-01-26 RX ORDER — MONTELUKAST 4 MG/1
1 TABLET, CHEWABLE ORAL
Qty: 0 | Refills: 0 | DISCHARGE

## 2023-01-26 RX ORDER — PREGABALIN 225 MG/1
1000 CAPSULE ORAL DAILY
Refills: 0 | Status: DISCONTINUED | OUTPATIENT
Start: 2023-01-26 | End: 2023-02-06

## 2023-01-26 RX ORDER — BALSALAZIDE DISODIUM 750 MG/1
2250 CAPSULE ORAL
Refills: 0 | Status: DISCONTINUED | OUTPATIENT
Start: 2023-01-26 | End: 2023-01-26

## 2023-01-26 RX ORDER — TRAMADOL HYDROCHLORIDE AND ACETAMINOPHEN 37.5; 325 MG/1; MG/1
1 TABLET ORAL
Qty: 0 | Refills: 0 | DISCHARGE

## 2023-01-26 RX ORDER — ATORVASTATIN CALCIUM 80 MG/1
1 TABLET, FILM COATED ORAL
Qty: 0 | Refills: 0 | DISCHARGE

## 2023-01-26 RX ORDER — PANTOPRAZOLE SODIUM 20 MG/1
40 TABLET, DELAYED RELEASE ORAL
Refills: 0 | Status: DISCONTINUED | OUTPATIENT
Start: 2023-01-26 | End: 2023-01-26

## 2023-01-26 RX ADMIN — CLOTRIMAZOLE AND BETAMETHASONE DIPROPIONATE 1 APPLICATION(S): 10; .5 CREAM TOPICAL at 06:26

## 2023-01-26 RX ADMIN — Medication 2000 UNIT(S): at 11:21

## 2023-01-26 RX ADMIN — PREGABALIN 1000 MICROGRAM(S): 225 CAPSULE ORAL at 11:22

## 2023-01-26 RX ADMIN — Medication 125 MICROGRAM(S): at 12:31

## 2023-01-26 RX ADMIN — LISINOPRIL 20 MILLIGRAM(S): 2.5 TABLET ORAL at 12:31

## 2023-01-26 RX ADMIN — CLOTRIMAZOLE AND BETAMETHASONE DIPROPIONATE 1 APPLICATION(S): 10; .5 CREAM TOPICAL at 17:05

## 2023-01-26 RX ADMIN — FLUCONAZOLE 100 MILLIGRAM(S): 150 TABLET ORAL at 12:31

## 2023-01-26 RX ADMIN — PROPOFOL 24 MICROGRAM(S)/KG/MIN: 10 INJECTION, EMULSION INTRAVENOUS at 04:09

## 2023-01-26 RX ADMIN — Medication 100 MILLIGRAM(S): at 11:22

## 2023-01-26 RX ADMIN — PIPERACILLIN AND TAZOBACTAM 25 GRAM(S): 4; .5 INJECTION, POWDER, LYOPHILIZED, FOR SOLUTION INTRAVENOUS at 21:39

## 2023-01-26 RX ADMIN — BALSALAZIDE DISODIUM 2250 MILLIGRAM(S): 750 CAPSULE ORAL at 18:33

## 2023-01-26 RX ADMIN — PIPERACILLIN AND TAZOBACTAM 25 GRAM(S): 4; .5 INJECTION, POWDER, LYOPHILIZED, FOR SOLUTION INTRAVENOUS at 13:11

## 2023-01-26 RX ADMIN — PIPERACILLIN AND TAZOBACTAM 25 GRAM(S): 4; .5 INJECTION, POWDER, LYOPHILIZED, FOR SOLUTION INTRAVENOUS at 06:25

## 2023-01-26 RX ADMIN — ATORVASTATIN CALCIUM 40 MILLIGRAM(S): 80 TABLET, FILM COATED ORAL at 21:39

## 2023-01-26 RX ADMIN — PANTOPRAZOLE SODIUM 40 MILLIGRAM(S): 20 TABLET, DELAYED RELEASE ORAL at 17:10

## 2023-01-26 RX ADMIN — PANTOPRAZOLE SODIUM 40 MILLIGRAM(S): 20 TABLET, DELAYED RELEASE ORAL at 06:26

## 2023-01-26 RX ADMIN — CHLORHEXIDINE GLUCONATE 1 APPLICATION(S): 213 SOLUTION TOPICAL at 06:25

## 2023-01-26 NOTE — PROGRESS NOTE ADULT - SUBJECTIVE AND OBJECTIVE BOX
ICu transfer    seen in ICU  feels well  wants to trial diet  dominguez to be removed  abd discomfort/gas cramps but no pain  ros negative     MEDICATIONS  (STANDING):  allopurinol 100 milliGRAM(s) Oral daily  atorvastatin 40 milliGRAM(s) Oral at bedtime  chlorhexidine 2% Cloths 1 Application(s) Topical <User Schedule>  cholecalciferol 2000 Unit(s) Oral daily  clotrimazole/betamethasone Cream 1 Application(s) Topical two times a day  cyanocobalamin 1000 MICROGram(s) Oral daily  dextrose 5%. 1000 milliLiter(s) (100 mL/Hr) IV Continuous <Continuous>  dextrose 5%. 1000 milliLiter(s) (50 mL/Hr) IV Continuous <Continuous>  dextrose 50% Injectable 25 Gram(s) IV Push once  dextrose 50% Injectable 12.5 Gram(s) IV Push once  dextrose 50% Injectable 25 Gram(s) IV Push once  fluconAZOLE   Tablet 100 milliGRAM(s) Oral daily  glucagon  Injectable 1 milliGRAM(s) IntraMuscular once  insulin lispro (ADMELOG) corrective regimen sliding scale   SubCutaneous three times a day before meals  levothyroxine 125 MICROGram(s) Oral daily  lisinopril 20 milliGRAM(s) Oral daily  montelukast 10 milliGRAM(s) Oral daily  pantoprazole    Tablet 40 milliGRAM(s) Oral two times a day  piperacillin/tazobactam IVPB.. 3.375 Gram(s) IV Intermittent every 8 hours    MEDICATIONS  (PRN):  albuterol    90 MICROgram(s) HFA Inhaler 2 Puff(s) Inhalation every 6 hours PRN Shortness of Breath and/or Wheezing  dextrose Oral Gel 15 Gram(s) Oral once PRN Blood Glucose LESS THAN 70 milliGRAM(s)/deciliter  ondansetron Injectable 4 milliGRAM(s) IV Push every 4 hours PRN Nausea and/or Vomiting  oxycodone    5 mG/acetaminophen 325 mG 2 Tablet(s) Oral every 6 hours PRN mod to severe pain      Allergies    No Known Allergies      Vital Signs Last 24 Hrs  T(C): 36.4 (26 Jan 2023 11:15), Max: 36.4 (25 Jan 2023 23:14)  T(F): 97.5 (26 Jan 2023 11:15), Max: 97.5 (25 Jan 2023 23:14)  HR: 71 (26 Jan 2023 13:00) (39 - 82)  BP: 140/80 (26 Jan 2023 13:00) (82/65 - 149/114)  BP(mean): 140 (26 Jan 2023 13:00) (62 - 140)  RR: 26 (26 Jan 2023 13:00) (12 - 26)  SpO2: 99% (26 Jan 2023 13:00) (99% - 100%)    Parameters below as of 26 Jan 2023 12:00  Patient On (Oxygen Delivery Method): room air        PHYSICAL EXAM:    GENERAL: NAD  CHEST/LUNG: Clear to ausculation bilaterally  HEART: Regular rate and rhythm; S1 S2  ABDOMEN: Soft, Nontender, ; Bowel sounds present  EXTREMITIES: no pitting edema   NERVOUS SYSTEM:  Alert & Oriented X3,  Motor Strength 5/5 B/L upper and lower extremities  PSYCH: normal mood, appropriate response.    LABS:                        12.9   8.96  )-----------( 212      ( 26 Jan 2023 05:22 )             39.1     01-26    144  |  112<H>  |  22.9<H>  ----------------------------<  120<H>  5.1   |  21.0<L>  |  0.90    Ca    9.1      26 Jan 2023 05:22  Phos  4.3     01-26  Mg     2.1     01-26    TPro  5.7<L>  /  Alb  3.3  /  TBili  0.7  /  DBili  x   /  AST  23  /  ALT  11  /  AlkPhos  74  01-25    PT/INR - ( 25 Jan 2023 14:30 )   PT: 11.7 sec;   INR: 1.01 ratio         PTT - ( 25 Jan 2023 14:30 )  PTT:24.3 sec      CAPILLARY BLOOD GLUCOSE      POCT Blood Glucose.: 106 mg/dL (26 Jan 2023 11:35)  POCT Blood Glucose.: 122 mg/dL (26 Jan 2023 01:11)  POCT Blood Glucose.: 115 mg/dL (25 Jan 2023 17:11)        RADIOLOGY & ADDITIONAL TESTS:

## 2023-01-26 NOTE — CHART NOTE - NSCHARTNOTEFT_GEN_A_CORE
78 y/o F with PMHx of HTN, HLD, hypothyroidism, DM type 2, asthma, Ulcerative Colitis, who initially presented to [facility] on 1/24 complaining of large episode of hematochezia. Upon arrival to ER, patient had two additional episodes with associated hypotension SBP 80's. Pt hgb 13.9 on presentation, and repeat 8.4. Pt received a total of 6u PRBCs, 2u platelets, 2u FFP, and cryo. CT abd/pelv with IV contrast demonstrated diverticulosis. Underwent EGD on 1/25 for which she was intubated, which revealed multiple clean based gastric and duodenal ulcers without stigmata of recent bleeding, which was felt to be unlikely to explain pt's GI bleeding. Pt was left intubated and transferred to [facility] for CTA abd/pelvis and possible IR intervention in the setting of suspected lower GI bleed. On initial arrival to [facility] MICU, patient had 1 episode of hematochezia, H/H has been stable x 3 without further need for blood transfusion. CTA abd/pelvis revealed acute uncomplicated sigmoid diverticulitis; no active GI hemorrhage was identified. Patient was evaluated by GI, diet advanced to clear liquids and is planned for outpatient f/u with her GI Dr. Whitaker for colonoscopy. Patient's family was advised to bring in her home Balsalazine for treatment of her UC. Diflucan was Rx as outpatient by her PCP for LE ringworm, which was resumed. As H/H has stabilized and she has had no further GI bleeding and not required further PRBC transfusion, patient is stable for transfer out of MICU to medical floors. Please continue to monitor for further bleeding and do not hesitate to reconsult should clinical condition change. Case endorsed to Dr. Escobedo.

## 2023-01-26 NOTE — PROGRESS NOTE ADULT - ASSESSMENT
78 y/o F with PMHx of HTN, HLD, hypothyroidism, DM type 2, and asthma admitted with:    Acute blood loss anemia, lower GI bleed, sigmoid diverticulitis, CORKY (Cr 1.4 at [facility]), metabolic acidosis    PLAN:  - CTA abd/pelvis negative for active GI hemorrhage.  - S/p 6u PRBCs, 2u platelets, 2u FFP, and cryo. Has not required any further blood transfusions.   - Trend hgb q6 hrs. Transfuse to maintain hgb > 7.  - Change protonix infusion to BID.  - NPO.  - Never required initiation of phenylephrine infusion.  - Actively titrating ventilator settings to maintain SpO2 > 92% and adequate minute ventilation.  - Precedex infusion was started with the intent to transition off propofol infusion, but her hemodynamic status did not tolerate this. Precedex was discontinued.  - SAT/SBT as tolerated.  - Zosyn for diverticulitis.  - Renal function has improved.  - Mechanical DVT prophylaxis with SCDs.  - Diabetes management.    Case discussed with ICU attending, Dr. Go.

## 2023-01-26 NOTE — CONSULT NOTE ADULT - PROBLEM SELECTOR RECOMMENDATION 9
Most likely diverticular bleed. CTA showed acute diverticulitis. Hematochezia now resolved.   Uper endoscopy at [facility] which showed several clan bases gastric and duodenal ulcer with no active bleeding or visible vessel.  Hemoglobin now stable, this morning 12.9 gm, No further episodes of hematochezia obvert night. Vital signs stable, off vasopressor.  Can start clear liquid diet  Continue Protonic BID   Need colonoscopy once the acute diverticulitis resolved. Patient with history of UC diagnosed 8 years ago, last colonoscopy 3.5 years ago  Patient can follow up with her gastroenterologist at [place] Most likely diverticular bleed. CTA showed acute diverticulitis. Hematochezia now resolved.   Upper endoscopy at [facility] which showed several clan bases gastric and duodenal ulcer with no active bleeding or visible vessel.  Hemoglobin now stable, this morning 12.9 gm, No further episodes of hematochezia obvert night. Vital signs stable, off vasopressor.  Can start clear liquid diet  Continue Protonic BID   Need colonoscopy once the acute diverticulitis resolved. Patient with history of UC diagnosed 8 years ago, last colonoscopy 3.5 years ago  Patient can follow up with her gastroenterologist at [place] Dr. Whitaker

## 2023-01-26 NOTE — PROGRESS NOTE ADULT - ASSESSMENT
78 y/o F with PMHx of HTN, HLD, hypothyroidism, DM type 2, and asthma who initially presented to [facility] on 1/24 complaining of large episode of hematochezia. Upon arrival to ER, had two additional episodes. Pt with hgb 13.9 on presentation. Overnight, had an acute drop in hgb (repeat 8.4). Pt received a total of 6u PRBCs, 2u platelets, 2u FFP, and cryo. Underwent EGD on 1/25 which was revealing of clean based gastric and duodenal ulcers, unlikely to explain pt's severe GI bleeding. Pt was transferred to [facility] for CTA abd/pelvis and possible IR intervention in the setting of suspected lower GI bleed. Pt remained intubated s/p EGD. CTA abd/pelvis revealed acute uncomplicated sigmoid diverticulitis, and no site of active GI hemorrhage was identified. extubated in ICU and monitored w/o further gi hemorrhaged. stable for transfer to medicine.    acute blood loss anemia: likely diverticular    PPI BID    advance diet    GI following, outpatient colonoscopy      Peptic ulcer disease: c/w PPI BID    acute diverticulitis: c/w zosyn, transition to augmentin on dc    UC: restart home balsalazide 750 mg oral capsule 3 cap(s) orally once a day   family brought it in     hypothyroid: synthroid.    Dm2: a1c 5.2.     dc in am likely if continues to remain stable.  TOALONZO

## 2023-01-26 NOTE — PROGRESS NOTE ADULT - SUBJECTIVE AND OBJECTIVE BOX
Patient is a 76 y/o female who presents with a chief complaint of GIB (25 Jan 2023 14:40)    BRIEF HOSPITAL COURSE: 76 y/o F with PMHx of HTN, HLD, hypothyroidism, DM type 2, and asthma who initially presented to [facility] on 1/24 complaining of large episode of hematochezia. Upon arrival to ER, had two additional episodes. Pt with hgb 13.9 on presentation. Overnight, had an acute drop in hgb (repeat 8.4). Pt received a total of 6u PRBCs, 2u platelets, 2u FFP, and cryo. Underwent EGD on 1/25 which was revealing of clean based gastric and duodenal ulcers, unlikely to explain pt's severe GI bleeding. Pt was transferred to [facility] for CTA abd/pelvis and possible IR intervention in the setting of suspected lower GI bleed. Pt remained intubated s/p EGD. CTA abd/pelvis revealed acute uncomplicated sigmoid diverticulitis, and no site of active GI hemorrhage was identified.     Events last 24 hours: One episode of hematochezia since arrival to [facility]. Has not required any further blood products. Hgb has remained stable (13.7 --> 13.4). Remains intubated, was reportedly difficult to sedate. Currently on propofol infusion. Precedex added overnight in anticipation of extubation in AM.    PAST MEDICAL & SURGICAL HISTORY:    Review of Systems:  Unable to obtain. Intubated/sedated.    Medications:  piperacillin/tazobactam IVPB.. 3.375 Gram(s) IV Intermittent every 8 hours  norepinephrine Infusion 0.05 MICROgram(s)/kG/Min IV Continuous <Continuous>  phenylephrine    Infusion 0.3 MICROgram(s)/kG/Min IV Continuous <Continuous>  albuterol    90 MICROgram(s) HFA Inhaler 2 Puff(s) Inhalation every 6 hours PRN  montelukast 10 milliGRAM(s) Oral daily  dexMEDEtomidine Infusion 0.3 MICROgram(s)/kG/Hr IV Continuous <Continuous>  fentaNYL    Injectable 50 MICROGram(s) IV Push every 2 hours PRN  midazolam Injectable 2 milliGRAM(s) IV Push every 2 hours PRN  ondansetron Injectable 4 milliGRAM(s) IV Push every 4 hours PRN  propofol Infusion 40 MICROgram(s)/kG/Min IV Continuous <Continuous>  pantoprazole Infusion 8 mG/Hr IV Continuous <Continuous>  allopurinol 100 milliGRAM(s) Oral daily  atorvastatin 40 milliGRAM(s) Oral at bedtime  dextrose 50% Injectable 25 Gram(s) IV Push once  dextrose 50% Injectable 12.5 Gram(s) IV Push once  dextrose 50% Injectable 25 Gram(s) IV Push once  dextrose Oral Gel 15 Gram(s) Oral once PRN  glucagon  Injectable 1 milliGRAM(s) IntraMuscular once  insulin lispro (ADMELOG) corrective regimen sliding scale   SubCutaneous three times a day before meals  levothyroxine 125 MICROGram(s) Oral daily  cholecalciferol 2000 Unit(s) Oral daily  cyanocobalamin 1000 MICROGram(s) Oral daily  dextrose 5%. 1000 milliLiter(s) IV Continuous <Continuous>  dextrose 5%. 1000 milliLiter(s) IV Continuous <Continuous>  chlorhexidine 0.12% Liquid 15 milliLiter(s) Oral Mucosa every 12 hours  chlorhexidine 2% Cloths 1 Application(s) Topical <User Schedule>  clotrimazole/betamethasone Cream 1 Application(s) Topical two times a day    Mode: AC/ CMV (Assist Control/ Continuous Mandatory Ventilation)  RR (machine): 14  TV (machine): 450  FiO2: 40  PEEP: 6  MAP: 10  PIP: 23    ICU Vital Signs Last 24 Hrs  T(C): 36.4 (25 Jan 2023 23:14), Max: 36.4 (25 Jan 2023 23:14)  T(F): 97.5 (25 Jan 2023 23:14), Max: 97.5 (25 Jan 2023 23:14)  HR: 43 (25 Jan 2023 23:00) (43 - 76)  BP: 90/51 (25 Jan 2023 23:00) (90/47 - 150/68)  BP(mean): 63 (25 Jan 2023 23:00) (62 - 96)  ABP: --  ABP(mean): --  RR: 14 (25 Jan 2023 23:00) (12 - 30)  SpO2: 99% (25 Jan 2023 23:00) (72% - 100%)    O2 Parameters below as of 25 Jan 2023 14:12  Patient On (Oxygen Delivery Method): ventilator    ABG - ( 25 Jan 2023 14:48 )  pH, Arterial: 7.290 pH, Blood: x     /  pCO2: 39    /  pO2: 129   / HCO3: 19    / Base Excess: -7.8  /  SaO2: 100.0     I&O's Detail    25 Jan 2023 07:01  -  26 Jan 2023 00:08  --------------------------------------------------------  IN:    IV PiggyBack: 100 mL    Oral Fluid: 180 mL    Pantoprazole: 50 mL    Propofol: 142 mL  Total IN: 472 mL    OUT:    Voided (mL): 400 mL  Total OUT: 400 mL    Total NET: 72 mL    LABS:                        13.4   9.77  )-----------( 156      ( 25 Jan 2023 21:08 )             40.5     01-25    140  |  112<H>  |  34.5<H>  ----------------------------<  137<H>  5.1   |  17.0<L>  |  0.96    Ca    8.3<L>      25 Jan 2023 14:30  Phos  3.5     01-25  Mg     1.9     01-25    TPro  5.7<L>  /  Alb  3.3  /  TBili  0.7  /  DBili  x   /  AST  23  /  ALT  11  /  AlkPhos  74  01-25    CAPILLARY BLOOD GLUCOSE    POCT Blood Glucose.: 115 mg/dL (25 Jan 2023 17:11)    PT/INR - ( 25 Jan 2023 14:30 )   PT: 11.7 sec;   INR: 1.01 ratio    PTT - ( 25 Jan 2023 14:30 )  PTT:24.3 sec    CULTURES:    Physical Examination:    General: In no acute distress.    HEENT: Pupils equal, reactive to light. Symmetric. No scleral icterus or injection.    PULM: Clear to auscultation b/l.    NECK: Supple, no lymphadenopathy, trachea midline.    CVS: Bradycardic, regular rhythm, no murmurs appreciated, +s1/s2.    ABD: Soft, nondistended, nontender, normoactive bowel sounds.    EXT: No edema, nontender.    SKIN: Warm and well perfused, no rashes noted.    NEURO: Moves all extremities spontaneously.    RADIOLOGY:  < from: CT Angio Abdomen and Pelvis w/ IV Cont (01.25.23 @ 16:26) >  ACC: [id number] EXAM:  CT ANGIO ABD PELV (W)AW IC   ORDERED BY: MARVA AGUILERA     PROCEDURE DATE:  01/25/2023      INTERPRETATION:  Clinical data: 77-year-old female with acute blood loss   anemia secondary to lower GI bleed    COMPARISON: CT scan 01/25/2023 at 12:32 AM    PROCEDURE:  CT of the Abdomen and Pelvis was performed.  Precontrast, Arterial and Delayed phases were performed.  Sagittal and coronal reformats were performed.    FINDINGS:  LOWER CHEST: Mild cardiomegaly. Bibasilaratelectasis.    LIVER: Within normal limits.  BILE DUCTS: Normal caliber.  GALLBLADDER: Within normal limits.  SPLEEN: Within normal limits.  PANCREAS: Within normal limits.  ADRENALS: Within normal limits.  KIDNEYS/URETERS: Within normal limits.    BLADDER: Within normal limits.  REPRODUCTIVE ORGANS: Hysterectomy.    BOWEL: No bowel obstruction. Mural thickening rectum and sigmoid colon   most marked mid sigmoid colon with multiple diverticula and mild   surrounding inflammation consistent with acute diverticulitis. No site of   active hemorrhage identified.  Appendix within normal limits.   Esophagogastric tube is identified ending in the proximal duodenum.  PERITONEUM: No ascites.  VESSELS: Within normal limits.  RETROPERITONEUM/LYMPH NODES: No lymphadenopathy.  ABDOMINAL WALL: Within normal limits.  BONES: ORIF left hip. Degenerative change.    IMPRESSION:  Acute uncomplicated sigmoid diverticulitis.  No site of active GI hemorrhage identified    --- End of Report ---    CHRISTOPHER PARHAM; Attending Radiologist  This document has been electronically signed. Jan 25 2023  5:48PM    < end of copied text >    CRITICAL CARE TIME SPENT: 39 minutes

## 2023-01-26 NOTE — CONSULT NOTE ADULT - SUBJECTIVE AND OBJECTIVE BOX
HISTORY OF PRESENT ILLNESS: 78 y/o female w PMHx of Ulcerative colitis (diagnosed 8 years ago) Asthma, HTN, HLD, hypothyroid, DM, transferred from [facility] ICU for acute blood loss anemia, hematochezia. Patient presented to [facility] with several episodes of BRBPR, and her hemoglobin dropped from 13.9 to 8.4 gm foe which she received several units of blood products (PRBC x 6, Platelets x 2, FFP x 2, and cryoprecipitate). She underwent  upper endoscopy at [facility] which showed several clan bases gastric and duodenal ulcer with no active bleeding or visible vessel. Patient was transferred to [facility] for possible IR intervention.  CT angio with evidence cod active bleeding and showed acute sigmoid diverticulitis. No further episodes of rectal bleed over night. Her hemoglobin 12.9 this morning Patient is now extubated and off vasopressors. Patient denies nausea, vomition, abdominal pain.  Patient follow with Dr. Whitaker as outpatient and her colonoscopy was 3.5 years ago.     Review of Systems:  . Constitutional: No fever, chills  . HEENT: Negative  · Respiratory and Thorax: No shortness of breath, no cough, no wheezing  · Cardiovascular: No chest pain, palpitation, no dizziness, no orthopnea,   · Gastrointestinal: see above.  · Genitourinary: No hematuria  · Musculoskeletal: Negative  · Neurological: no headache, no vision changes, no slurred speech  · Psychiatric: no agitation, no anxiety  · Hematology/Lymphatics: negative  · Endocrine: negative      PAST MEDICAL/SURGICAL HISTORY:    SOCIAL HISTORY:  - TOBACCO: Denies  - ALCOHOL: Denies  - ILLICIT DRUG USE: Denies    FAMILY HISTORY:  No known history of gastrointestinal or liver disease;      HOME MEDICATIONS:    INPATIENT MEDICATIONS:  MEDICATIONS  (STANDING):  allopurinol 100 milliGRAM(s) Oral daily  atorvastatin 40 milliGRAM(s) Oral at bedtime  chlorhexidine 2% Cloths 1 Application(s) Topical <User Schedule>  cholecalciferol 2000 Unit(s) Oral daily  clotrimazole/betamethasone Cream 1 Application(s) Topical two times a day  cyanocobalamin 1000 MICROGram(s) Oral daily  dextrose 5%. 1000 milliLiter(s) (100 mL/Hr) IV Continuous <Continuous>  dextrose 5%. 1000 milliLiter(s) (50 mL/Hr) IV Continuous <Continuous>  dextrose 50% Injectable 25 Gram(s) IV Push once  dextrose 50% Injectable 12.5 Gram(s) IV Push once  dextrose 50% Injectable 25 Gram(s) IV Push once  glucagon  Injectable 1 milliGRAM(s) IntraMuscular once  insulin lispro (ADMELOG) corrective regimen sliding scale   SubCutaneous three times a day before meals  levothyroxine 125 MICROGram(s) Oral daily  montelukast 10 milliGRAM(s) Oral daily  pantoprazole  Injectable 40 milliGRAM(s) IV Push two times a day  piperacillin/tazobactam IVPB.. 3.375 Gram(s) IV Intermittent every 8 hours    MEDICATIONS  (PRN):  albuterol    90 MICROgram(s) HFA Inhaler 2 Puff(s) Inhalation every 6 hours PRN Shortness of Breath and/or Wheezing  dextrose Oral Gel 15 Gram(s) Oral once PRN Blood Glucose LESS THAN 70 milliGRAM(s)/deciliter  ondansetron Injectable 4 milliGRAM(s) IV Push every 4 hours PRN Nausea and/or Vomiting    ALLERGIES:  No Known Allergies    T(C): 36.4 (01-26-23 @ 07:15), Max: 36.4 (01-25-23 @ 23:14)  HR: 67 (01-26-23 @ 08:00) (39 - 76)  BP: 125/60 (01-26-23 @ 08:00) (82/65 - 150/68)  RR: 17 (01-26-23 @ 08:00) (12 - 30)  SpO2: 100% (01-26-23 @ 08:00) (72% - 100%)    01-25-23 @ 07:01  -  01-26-23 @ 07:00  --------------------------------------------------------  IN: 840 mL / OUT: 1600 mL / NET: -760 mL    01-26-23 @ 07:01  -  01-26-23 @ 09:40  --------------------------------------------------------  IN: 0 mL / OUT: 125 mL / NET: -125 mL      Mode: CPAP with PS, FiO2: 40, PEEP: 5, PS: 5, MAP: 8    PHYSICAL EXAM:  Constitutional: No acute distress  Neuro: Awake alert, oriented to person, place and situation, non-focal, speech clear and intact  HEENT: PERRL, anicteric sclerae  Neck: supple, no JVD  CV: regular rate, regular rhythm, +S1S2,   Pulm/chest: lung sounds clear bilaterally, no accessory muscle use noted  Abd: soft, NT, ND, +BS  Ext:  no Cyanosis, clubbing or edema  Skin: warm,  no jaundice   Psych: calm, appropriate affect        LABS:             12.9   8.96  )-----------( 212      ( 01-26 @ 05:22 )             39.1                13.4   9.77  )-----------( 156      ( 01-25 @ 21:08 )             40.5                13.7   12.54 )-----------( 191      ( 01-25 @ 14:30 )             42.0       PT/INR - ( 25 Jan 2023 14:30 )   PT: 11.7 sec;   INR: 1.01 ratio         PTT - ( 25 Jan 2023 14:30 )  PTT:24.3 sec  01-26    144  |  112<H>  |  22.9<H>  ----------------------------<  120<H>  5.1   |  21.0<L>  |  0.90    Ca    9.1      26 Jan 2023 05:22  Phos  4.3     01-26  Mg     2.1     01-26    TPro  5.7<L>  /  Alb  3.3  /  TBili  0.7  /  DBili  x   /  AST  23  /  ALT  11  /  AlkPhos  74  01-25    LIVER FUNCTIONS - ( 25 Jan 2023 14:30 )  Alb: 3.3 g/dL / Pro: 5.7 g/dL / ALK PHOS: 74 U/L / ALT: 11 U/L / AST: 23 U/L / GGT: x             < from: CT Angio Abdomen and Pelvis w/ IV Cont (01.25.23 @ 16:26) >  FINDINGS:  LOWER CHEST: Mild cardiomegaly. Bibasilaratelectasis.    LIVER: Within normal limits.  BILE DUCTS: Normal caliber.  GALLBLADDER: Within normal limits.  SPLEEN: Within normal limits.  PANCREAS: Within normal limits.  ADRENALS: Within normal limits.  KIDNEYS/URETERS: Within normal limits.    BLADDER: Within normal limits.  REPRODUCTIVE ORGANS: Hysterectomy.    BOWEL: No bowel obstruction. Mural thickening rectum and sigmoid colon   most marked mid sigmoid colon with multiple diverticula and mild   surrounding inflammation consistent with acute diverticulitis. No site of   active hemorrhage identified.  Appendix within normal limits.   Esophagogastric tube is identified ending in the proximal duodenum.  PERITONEUM: No ascites.  VESSELS: Within normal limits.  RETROPERITONEUM/LYMPH NODES: No lymphadenopathy.  ABDOMINAL WALL: Within normal limits.  BONES: ORIF left hip. Degenerative change.    IMPRESSION:  Acute uncomplicated sigmoid diverticulitis.  No site of active GI hemorrhage identified    < end of copied text >     HISTORY OF PRESENT ILLNESS: 76 y/o female w PMHx of Ulcerative colitis (diagnosed 8 years ago) Asthma, HTN, HLD, hypothyroid, DM, [place] [facility] [place] acute blood loss anemia, hematochezia. Patient presented [place] [facility] [place] several episodes of BRBPR, and her hemoglobin dropped from 13.9 to 8.4 gm foe which she received several units of blood products (PRBC x 6, Platelets x 2, FFP x 2, and cryoprecipitate). She underwent  upper endoscopy at [facility] which showed several clan bases gastric and duodenal ulcer with no active bleeding or visible vessel. Patient was transferred to [facility] for possible IR intervention.  CT angio with evidence cod active bleeding and showed acute sigmoid diverticulitis. No further episodes of rectal bleed over night. Her hemoglobin 12.9 this morning Patient is now extubated and off vasopressors. Patient denies nausea, vomition, abdominal pain.  Patient follow with Dr. Whitaker as outpatient and her colonoscopy was 3.5 years ago.     Review of Systems:  . Constitutional: No fever, chills  . HEENT: Negative  · Respiratory and Thorax: No shortness of breath, no cough, no wheezing  · Cardiovascular: No chest pain, palpitation, no dizziness, no orthopnea,   · Gastrointestinal: see above.  · Genitourinary: No hematuria  · Musculoskeletal: Negative  · Neurological: no headache, no vision changes, no slurred speech  · Psychiatric: no agitation, no anxiety  · Hematology/Lymphatics: negative  · Endocrine: negative      PAST MEDICAL/SURGICAL HISTORY:    SOCIAL HISTORY:  - TOBACCO: Denies  - ALCOHOL: Denies  - ILLICIT DRUG USE: Denies    FAMILY HISTORY:  No known history of gastrointestinal or liver disease;      HOME MEDICATIONS:    INPATIENT MEDICATIONS:  MEDICATIONS  (STANDING):  allopurinol 100 milliGRAM(s) Oral daily  atorvastatin 40 milliGRAM(s) Oral at bedtime  chlorhexidine 2% Cloths 1 Application(s) Topical <User Schedule>  cholecalciferol 2000 Unit(s) Oral daily  clotrimazole/betamethasone Cream 1 Application(s) Topical two times a day  cyanocobalamin 1000 MICROGram(s) Oral daily  dextrose 5%. 1000 milliLiter(s) (100 mL/Hr) IV Continuous <Continuous>  dextrose 5%. 1000 milliLiter(s) (50 mL/Hr) IV Continuous <Continuous>  dextrose 50% Injectable 25 Gram(s) IV Push once  dextrose 50% Injectable 12.5 Gram(s) IV Push once  dextrose 50% Injectable 25 Gram(s) IV Push once  glucagon  Injectable 1 milliGRAM(s) IntraMuscular once  insulin lispro (ADMELOG) corrective regimen sliding scale   SubCutaneous three times a day before meals  levothyroxine 125 MICROGram(s) Oral daily  montelukast 10 milliGRAM(s) Oral daily  pantoprazole  Injectable 40 milliGRAM(s) IV Push two times a day  piperacillin/tazobactam IVPB.. 3.375 Gram(s) IV Intermittent every 8 hours    MEDICATIONS  (PRN):  albuterol    90 MICROgram(s) HFA Inhaler 2 Puff(s) Inhalation every 6 hours PRN Shortness of Breath and/or Wheezing  dextrose Oral Gel 15 Gram(s) Oral once PRN Blood Glucose LESS THAN 70 milliGRAM(s)/deciliter  ondansetron Injectable 4 milliGRAM(s) IV Push every 4 hours PRN Nausea and/or Vomiting    ALLERGIES:  No Known Allergies    T(C): 36.4 (01-26-23 @ 07:15), Max: 36.4 (01-25-23 @ 23:14)  HR: 67 (01-26-23 @ 08:00) (39 - 76)  BP: 125/60 (01-26-23 @ 08:00) (82/65 - 150/68)  RR: 17 (01-26-23 @ 08:00) (12 - 30)  SpO2: 100% (01-26-23 @ 08:00) (72% - 100%)    01-25-23 @ 07:01  -  01-26-23 @ 07:00  --------------------------------------------------------  IN: 840 mL / OUT: 1600 mL / NET: -760 mL    01-26-23 @ 07:01  -  01-26-23 @ 09:40  --------------------------------------------------------  IN: 0 mL / OUT: 125 mL / NET: -125 mL      Mode: CPAP with PS, FiO2: 40, PEEP: 5, PS: 5, MAP: 8    PHYSICAL EXAM:  Constitutional: No acute distress  Neuro: Awake alert, oriented to person, place and situation, non-focal, speech clear and intact  HEENT: PERRL, anicteric sclerae  Neck: supple, no JVD  CV: regular rate, regular rhythm, +S1S2,   Pulm/chest: lung sounds clear bilaterally, no accessory muscle use noted  Abd: soft, NT, ND, +BS  Ext:  no Cyanosis, clubbing or edema  Skin: warm,  no jaundice   Psych: calm, appropriate affect        LABS:             12.9   8.96  )-----------( 212      ( 01-26 @ 05:22 )             39.1                13.4   9.77  )-----------( 156      ( 01-25 @ 21:08 )             40.5                13.7   12.54 )-----------( 191      ( 01-25 @ 14:30 )             42.0       PT/INR - ( 25 Jan 2023 14:30 )   PT: 11.7 sec;   INR: 1.01 ratio         PTT - ( 25 Jan 2023 14:30 )  PTT:24.3 sec  01-26    144  |  112<H>  |  22.9<H>  ----------------------------<  120<H>  5.1   |  21.0<L>  |  0.90    Ca    9.1      26 Jan 2023 05:22  Phos  4.3     01-26  Mg     2.1     01-26    TPro  5.7<L>  /  Alb  3.3  /  TBili  0.7  /  DBili  x   /  AST  23  /  ALT  11  /  AlkPhos  74  01-25    LIVER FUNCTIONS - ( 25 Jan 2023 14:30 )  Alb: 3.3 g/dL / Pro: 5.7 g/dL / ALK PHOS: 74 U/L / ALT: 11 U/L / AST: 23 U/L / GGT: x             < from: CT Angio Abdomen and Pelvis w/ IV Cont (01.25.23 @ 16:26) >  FINDINGS:  LOWER CHEST: Mild cardiomegaly. Bibasilaratelectasis.    LIVER: Within normal limits.  BILE DUCTS: Normal caliber.  GALLBLADDER: Within normal limits.  SPLEEN: Within normal limits.  PANCREAS: Within normal limits.  ADRENALS: Within normal limits.  KIDNEYS/URETERS: Within normal limits.    BLADDER: Within normal limits.  REPRODUCTIVE ORGANS: Hysterectomy.    BOWEL: No bowel obstruction. Mural thickening rectum and sigmoid colon   most marked mid sigmoid colon with multiple diverticula and mild   surrounding inflammation consistent with acute diverticulitis. No site of   active hemorrhage identified.  Appendix within normal limits.   Esophagogastric tube is identified ending in the proximal duodenum.  PERITONEUM: No ascites.  VESSELS: Within normal limits.  RETROPERITONEUM/LYMPH NODES: No lymphadenopathy.  ABDOMINAL WALL: Within normal limits.  BONES: ORIF left hip. Degenerative change.    IMPRESSION:  Acute uncomplicated sigmoid diverticulitis.  No site of active GI hemorrhage identified    < end of copied text >

## 2023-01-27 ENCOUNTER — TRANSCRIPTION ENCOUNTER (OUTPATIENT)
Age: 78
End: 2023-01-27

## 2023-01-27 LAB
A1C WITH ESTIMATED AVERAGE GLUCOSE RESULT: 5.2 % — SIGNIFICANT CHANGE UP (ref 4–5.6)
ANION GAP SERPL CALC-SCNC: 11 MMOL/L — SIGNIFICANT CHANGE UP (ref 5–17)
BASOPHILS # BLD AUTO: 0.03 K/UL — SIGNIFICANT CHANGE UP (ref 0–0.2)
BASOPHILS NFR BLD AUTO: 0.3 % — SIGNIFICANT CHANGE UP (ref 0–2)
BUN SERPL-MCNC: 18.2 MG/DL — SIGNIFICANT CHANGE UP (ref 8–20)
CALCIUM SERPL-MCNC: 9.2 MG/DL — SIGNIFICANT CHANGE UP (ref 8.4–10.5)
CHLORIDE SERPL-SCNC: 112 MMOL/L — HIGH (ref 96–108)
CO2 SERPL-SCNC: 21 MMOL/L — LOW (ref 22–29)
CREAT SERPL-MCNC: 0.72 MG/DL — SIGNIFICANT CHANGE UP (ref 0.5–1.3)
EGFR: 86 ML/MIN/1.73M2 — SIGNIFICANT CHANGE UP
EOSINOPHIL # BLD AUTO: 0.26 K/UL — SIGNIFICANT CHANGE UP (ref 0–0.5)
EOSINOPHIL NFR BLD AUTO: 2.6 % — SIGNIFICANT CHANGE UP (ref 0–6)
ESTIMATED AVERAGE GLUCOSE: 103 MG/DL — SIGNIFICANT CHANGE UP (ref 68–114)
GLUCOSE BLDC GLUCOMTR-MCNC: 105 MG/DL — HIGH (ref 70–99)
GLUCOSE BLDC GLUCOMTR-MCNC: 116 MG/DL — HIGH (ref 70–99)
GLUCOSE BLDC GLUCOMTR-MCNC: 126 MG/DL — HIGH (ref 70–99)
GLUCOSE BLDC GLUCOMTR-MCNC: 136 MG/DL — HIGH (ref 70–99)
GLUCOSE SERPL-MCNC: 97 MG/DL — SIGNIFICANT CHANGE UP (ref 70–99)
HCT VFR BLD CALC: 34.5 % — SIGNIFICANT CHANGE UP (ref 34.5–45)
HCT VFR BLD CALC: 36.5 % — SIGNIFICANT CHANGE UP (ref 34.5–45)
HCT VFR BLD CALC: 37.5 % — SIGNIFICANT CHANGE UP (ref 34.5–45)
HGB BLD-MCNC: 11.3 G/DL — LOW (ref 11.5–15.5)
HGB BLD-MCNC: 11.9 G/DL — SIGNIFICANT CHANGE UP (ref 11.5–15.5)
HGB BLD-MCNC: 12.1 G/DL — SIGNIFICANT CHANGE UP (ref 11.5–15.5)
IMM GRANULOCYTES NFR BLD AUTO: 0.3 % — SIGNIFICANT CHANGE UP (ref 0–0.9)
LYMPHOCYTES # BLD AUTO: 2.08 K/UL — SIGNIFICANT CHANGE UP (ref 1–3.3)
LYMPHOCYTES # BLD AUTO: 20.6 % — SIGNIFICANT CHANGE UP (ref 13–44)
MAGNESIUM SERPL-MCNC: 2.1 MG/DL — SIGNIFICANT CHANGE UP (ref 1.6–2.6)
MCHC RBC-ENTMCNC: 29.2 PG — SIGNIFICANT CHANGE UP (ref 27–34)
MCHC RBC-ENTMCNC: 29.6 PG — SIGNIFICANT CHANGE UP (ref 27–34)
MCHC RBC-ENTMCNC: 32.6 GM/DL — SIGNIFICANT CHANGE UP (ref 32–36)
MCHC RBC-ENTMCNC: 32.8 GM/DL — SIGNIFICANT CHANGE UP (ref 32–36)
MCV RBC AUTO: 89.1 FL — SIGNIFICANT CHANGE UP (ref 80–100)
MCV RBC AUTO: 90.8 FL — SIGNIFICANT CHANGE UP (ref 80–100)
MONOCYTES # BLD AUTO: 0.72 K/UL — SIGNIFICANT CHANGE UP (ref 0–0.9)
MONOCYTES NFR BLD AUTO: 7.1 % — SIGNIFICANT CHANGE UP (ref 2–14)
NEUTROPHILS # BLD AUTO: 6.97 K/UL — SIGNIFICANT CHANGE UP (ref 1.8–7.4)
NEUTROPHILS NFR BLD AUTO: 69.1 % — SIGNIFICANT CHANGE UP (ref 43–77)
PHOSPHATE SERPL-MCNC: 2 MG/DL — LOW (ref 2.4–4.7)
PLATELET # BLD AUTO: 184 K/UL — SIGNIFICANT CHANGE UP (ref 150–400)
PLATELET # BLD AUTO: 187 K/UL — SIGNIFICANT CHANGE UP (ref 150–400)
POTASSIUM SERPL-MCNC: 3.8 MMOL/L — SIGNIFICANT CHANGE UP (ref 3.5–5.3)
POTASSIUM SERPL-SCNC: 3.8 MMOL/L — SIGNIFICANT CHANGE UP (ref 3.5–5.3)
RBC # BLD: 3.87 M/UL — SIGNIFICANT CHANGE UP (ref 3.8–5.2)
RBC # BLD: 4.02 M/UL — SIGNIFICANT CHANGE UP (ref 3.8–5.2)
RBC # FLD: 15 % — HIGH (ref 10.3–14.5)
RBC # FLD: 15.3 % — HIGH (ref 10.3–14.5)
SODIUM SERPL-SCNC: 144 MMOL/L — SIGNIFICANT CHANGE UP (ref 135–145)
WBC # BLD: 10.09 K/UL — SIGNIFICANT CHANGE UP (ref 3.8–10.5)
WBC # BLD: 12.13 K/UL — HIGH (ref 3.8–10.5)
WBC # FLD AUTO: 10.09 K/UL — SIGNIFICANT CHANGE UP (ref 3.8–10.5)
WBC # FLD AUTO: 12.13 K/UL — HIGH (ref 3.8–10.5)

## 2023-01-27 PROCEDURE — 99233 SBSQ HOSP IP/OBS HIGH 50: CPT

## 2023-01-27 RX ORDER — SODIUM,POTASSIUM PHOSPHATES 278-250MG
1 POWDER IN PACKET (EA) ORAL
Refills: 0 | Status: COMPLETED | OUTPATIENT
Start: 2023-01-27 | End: 2023-01-28

## 2023-01-27 RX ADMIN — Medication 1 PACKET(S): at 16:48

## 2023-01-27 RX ADMIN — CLOTRIMAZOLE AND BETAMETHASONE DIPROPIONATE 1 APPLICATION(S): 10; .5 CREAM TOPICAL at 06:05

## 2023-01-27 RX ADMIN — Medication 125 MICROGRAM(S): at 06:06

## 2023-01-27 RX ADMIN — CHLORHEXIDINE GLUCONATE 1 APPLICATION(S): 213 SOLUTION TOPICAL at 06:05

## 2023-01-27 RX ADMIN — PREGABALIN 1000 MICROGRAM(S): 225 CAPSULE ORAL at 16:47

## 2023-01-27 RX ADMIN — PANTOPRAZOLE SODIUM 40 MILLIGRAM(S): 20 TABLET, DELAYED RELEASE ORAL at 06:06

## 2023-01-27 RX ADMIN — MONTELUKAST 10 MILLIGRAM(S): 4 TABLET, CHEWABLE ORAL at 12:18

## 2023-01-27 RX ADMIN — PIPERACILLIN AND TAZOBACTAM 25 GRAM(S): 4; .5 INJECTION, POWDER, LYOPHILIZED, FOR SOLUTION INTRAVENOUS at 12:17

## 2023-01-27 RX ADMIN — PANTOPRAZOLE SODIUM 40 MILLIGRAM(S): 20 TABLET, DELAYED RELEASE ORAL at 16:46

## 2023-01-27 RX ADMIN — BALSALAZIDE DISODIUM 2250 MILLIGRAM(S): 750 CAPSULE ORAL at 16:48

## 2023-01-27 RX ADMIN — PIPERACILLIN AND TAZOBACTAM 25 GRAM(S): 4; .5 INJECTION, POWDER, LYOPHILIZED, FOR SOLUTION INTRAVENOUS at 06:07

## 2023-01-27 RX ADMIN — Medication 100 MILLIGRAM(S): at 12:18

## 2023-01-27 RX ADMIN — ATORVASTATIN CALCIUM 40 MILLIGRAM(S): 80 TABLET, FILM COATED ORAL at 21:20

## 2023-01-27 RX ADMIN — FLUCONAZOLE 100 MILLIGRAM(S): 150 TABLET ORAL at 12:17

## 2023-01-27 RX ADMIN — CLOTRIMAZOLE AND BETAMETHASONE DIPROPIONATE 1 APPLICATION(S): 10; .5 CREAM TOPICAL at 16:48

## 2023-01-27 RX ADMIN — PIPERACILLIN AND TAZOBACTAM 25 GRAM(S): 4; .5 INJECTION, POWDER, LYOPHILIZED, FOR SOLUTION INTRAVENOUS at 21:21

## 2023-01-27 RX ADMIN — Medication 20 MILLIGRAM(S): at 06:06

## 2023-01-27 RX ADMIN — Medication 2000 UNIT(S): at 12:18

## 2023-01-27 RX ADMIN — LISINOPRIL 20 MILLIGRAM(S): 2.5 TABLET ORAL at 06:06

## 2023-01-27 NOTE — DIETITIAN INITIAL EVALUATION ADULT - ETIOLOGY
related to inability to meet estimated nutrition needs in setting of dx GIB, acute diverticulitis, clear liquid diet

## 2023-01-27 NOTE — PROGRESS NOTE ADULT - ASSESSMENT
77F with a history of UC on 5-asa here with acute GI bleeding. She was found to have gastric ulcers at an outside hospital with CTA demonstrating diverticulitis without active extravasation     Acute Blood Loss Anemia with acute diverticulitis  Continue to monitor HGB, repeat in the evening and maintain 7-8  Continue BID PPI  If she has evidence of active bleeding and hemodynamic instability consider CTA to evaluate for embolization the presence of diverticulitis increases the risk of perforation associated with endoscopic evaluation and is not recommended  Given her history of UC, check fecal calprotectin to assess if this is a concurrent flare, although with diverticulitis would be hesitant to start steroids   Continue antibiotics and diet as tolerated   Avoid nsaids  At least mechanical DVT PPX

## 2023-01-27 NOTE — DISCHARGE NOTE PROVIDER - HOSPITAL COURSE
78 y/o F with PMHx of HTN, HLD, hypothyroidism, DM type 2, and asthma who [place] [facility] [place] complaining of large episode of hematochezia. Upon arrival to ER, had two additional episodes. Pt with hgb 13.9 on presentation. Overnight, had an acute drop in hgb (repeat 8.4). Pt received a total of 6u PRBCs, 2u platelets, 2u FFP, and cryo. Underwent EGD on 1/25 which was revealing of clean based gastric and duodenal ulcers, unlikely to explain pt's severe GI bleeding. Pt was transferred to [facility] for CTA abd/pelvis and possible IR intervention in the setting of suspected lower GI bleed. Pt remained intubated s/p EGD. CTA abd/pelvis revealed acute uncomplicated sigmoid diverticulitis, and no site of active GI hemorrhage was identified. extubated in ICU and monitored w/o further gi hemorrhaged. stable for transfer to medicine.  followed by Gi, PPI bid recommended and outpatient colonoscopy recommended.     stable for dc home.         78 y/o F with PMHx of HTN, HLD, hypothyroidism, DM type 2, and asthma who [place] [facility] [place] of large episode of hematochezia. Upon arrival to ER, had two additional episodes. Pt with hgb 13.9 on presentation. Overnight, had an acute drop in hgb (repeat 8.4). Pt received a total of 6u PRBCs, 2u platelets, 2u FFP, and cryo. Underwent EGD on 1/25 which was revealing of clean based gastric and duodenal ulcers, unlikely to explain pt's severe GI bleeding. Pt was transferred to [facility] for CTA abd/pelvis and possible IR intervention in the setting of suspected lower GI bleed. Pt remained intubated s/p EGD. CTA abd/pelvis revealed acute uncomplicated sigmoid diverticulitis, and no site of active GI hemorrhage was identified. extubated in ICU and monitored w/o further gi hemorrhaged. stable for transfer to medicine.  followed by Gi, PPI bid recommended. Colonoscopy notable for severe diverticulosis.  77 year old female with PMHx of HTN, HLD, hypothyroidism, DM type 2, and asthma who [place] [facility] [place] with c/o large volume hematochezia. In the ED, patient had 2 more episodes of hematochezia with associated drop in hgb. Patient required MICU admission, requiring intubation and 6u PRBCs, 2u platelets, 2u FFP, and cryo. Underwent EGD on 1/25 which was revealing of clean based gastric and duodenal ulcers, unlikely to explain severe GI bleeding. Patient was transferred to [facility] for CTA abd/pelvis and possible IR embolization. At [facility], CTA abd/pelvis revealed acute uncomplicated sigmoid diverticulitis without active GI hemorrhage. Patient was evaluated by GI, empirically started on IV abx. Underwent IR angiogram which was negative for acute bleed, as well as colonoscopy which was notable for severe diverticulosis without active bleed. Diet was advanced and well tolerated. H/H remained stable without any further episodes of active bleeding. Patient is to follow up outpatient with Dr. Whitaker for further management. Course complicated by the development of new onset A-Fib. Rate was controlled, and AC was deferred given ongoing bleeding risk. Evaluated by PT, who recommend CORI. At this time, patient is medically ready for discharge with close outpatient follow up.

## 2023-01-27 NOTE — DIETITIAN INITIAL EVALUATION ADULT - PERTINENT MEDS FT
MEDICATIONS  (STANDING):  allopurinol 100 milliGRAM(s) Oral daily  atorvastatin 40 milliGRAM(s) Oral at bedtime  balsalazide 2250 milliGRAM(s) Oral <User Schedule>  chlorhexidine 2% Cloths 1 Application(s) Topical <User Schedule>  cholecalciferol 2000 Unit(s) Oral daily  clotrimazole/betamethasone Cream 1 Application(s) Topical two times a day  cyanocobalamin 1000 MICROGram(s) Oral daily  dextrose 5%. 1000 milliLiter(s) (100 mL/Hr) IV Continuous <Continuous>  dextrose 5%. 1000 milliLiter(s) (50 mL/Hr) IV Continuous <Continuous>  dextrose 50% Injectable 25 Gram(s) IV Push once  dextrose 50% Injectable 12.5 Gram(s) IV Push once  dextrose 50% Injectable 25 Gram(s) IV Push once  fluconAZOLE   Tablet 100 milliGRAM(s) Oral daily  glucagon  Injectable 1 milliGRAM(s) IntraMuscular once  insulin lispro (ADMELOG) corrective regimen sliding scale   SubCutaneous three times a day before meals  levothyroxine 125 MICROGram(s) Oral daily  lisinopril 20 milliGRAM(s) Oral daily  montelukast 10 milliGRAM(s) Oral daily  pantoprazole    Tablet 40 milliGRAM(s) Oral two times a day  piperacillin/tazobactam IVPB.. 3.375 Gram(s) IV Intermittent every 8 hours  potassium phosphate / sodium phosphate Powder (PHOS-NaK) 1 Packet(s) Oral three times a day before meals  torsemide 20 milliGRAM(s) Oral daily    MEDICATIONS  (PRN):  albuterol    90 MICROgram(s) HFA Inhaler 2 Puff(s) Inhalation every 6 hours PRN Shortness of Breath and/or Wheezing  dextrose Oral Gel 15 Gram(s) Oral once PRN Blood Glucose LESS THAN 70 milliGRAM(s)/deciliter  ondansetron Injectable 4 milliGRAM(s) IV Push every 4 hours PRN Nausea and/or Vomiting  oxycodone    5 mG/acetaminophen 325 mG 2 Tablet(s) Oral every 6 hours PRN mod to severe pain

## 2023-01-27 NOTE — DIETITIAN INITIAL EVALUATION ADULT - ADD RECOMMEND
RECOMMENDATIONS:  1) Ensure QD (350kcal,20gpro)  2) Monitor nutrition related labs and hydration status; iron stores, H/H  3) Monitor PO intake and weights

## 2023-01-27 NOTE — DIETITIAN INITIAL EVALUATION ADULT - OTHER INFO
· Assessment	  78 y/o F with PMHx of HTN, HLD, hypothyroidism, DM type 2, and asthma who [place] [facility] [place] of large episode of hematochezia. Upon arrival to ER, had two additional episodes. Pt with hgb 13.9 on presentation. Overnight, had an acute drop in hgb (repeat 8.4). Pt received a total of 6u PRBCs, 2u platelets, 2u FFP, and cryo. Underwent EGD on 1/25 which was revealing of clean based gastric and duodenal ulcers, unlikely to explain pt's severe GI bleeding. Pt was transferred to [facility] for CTA abd/pelvis and possible IR intervention in the setting of suspected lower GI bleed. Pt remained intubated s/p EGD. CTA abd/pelvis revealed acute uncomplicated sigmoid diverticulitis, and no site of active GI hemorrhage was identified. extubated in ICU and monitored w/o further gi hemorrhaged. stable for transfer to medicine.

## 2023-01-27 NOTE — PROGRESS NOTE ADULT - SUBJECTIVE AND OBJECTIVE BOX
seen for diverticular bleed    had episode of bloody and subsequent dark stool  tolerating clears   no other gi complaints  ros negative     MEDICATIONS  (STANDING):  allopurinol 100 milliGRAM(s) Oral daily  atorvastatin 40 milliGRAM(s) Oral at bedtime  balsalazide 2250 milliGRAM(s) Oral <User Schedule>  chlorhexidine 2% Cloths 1 Application(s) Topical <User Schedule>  cholecalciferol 2000 Unit(s) Oral daily  clotrimazole/betamethasone Cream 1 Application(s) Topical two times a day  cyanocobalamin 1000 MICROGram(s) Oral daily  dextrose 5%. 1000 milliLiter(s) (100 mL/Hr) IV Continuous <Continuous>  dextrose 5%. 1000 milliLiter(s) (50 mL/Hr) IV Continuous <Continuous>  dextrose 50% Injectable 25 Gram(s) IV Push once  dextrose 50% Injectable 12.5 Gram(s) IV Push once  dextrose 50% Injectable 25 Gram(s) IV Push once  fluconAZOLE   Tablet 100 milliGRAM(s) Oral daily  glucagon  Injectable 1 milliGRAM(s) IntraMuscular once  insulin lispro (ADMELOG) corrective regimen sliding scale   SubCutaneous three times a day before meals  levothyroxine 125 MICROGram(s) Oral daily  lisinopril 20 milliGRAM(s) Oral daily  montelukast 10 milliGRAM(s) Oral daily  pantoprazole    Tablet 40 milliGRAM(s) Oral two times a day  piperacillin/tazobactam IVPB.. 3.375 Gram(s) IV Intermittent every 8 hours  potassium phosphate / sodium phosphate Powder (PHOS-NaK) 1 Packet(s) Oral three times a day before meals  torsemide 20 milliGRAM(s) Oral daily    MEDICATIONS  (PRN):  albuterol    90 MICROgram(s) HFA Inhaler 2 Puff(s) Inhalation every 6 hours PRN Shortness of Breath and/or Wheezing  dextrose Oral Gel 15 Gram(s) Oral once PRN Blood Glucose LESS THAN 70 milliGRAM(s)/deciliter  ondansetron Injectable 4 milliGRAM(s) IV Push every 4 hours PRN Nausea and/or Vomiting  oxycodone    5 mG/acetaminophen 325 mG 2 Tablet(s) Oral every 6 hours PRN mod to severe pain      Allergies    No Known Allergies        Vital Signs Last 24 Hrs  T(C): 36.2 (27 Jan 2023 09:05), Max: 37 (26 Jan 2023 16:18)  T(F): 97.2 (27 Jan 2023 09:05), Max: 98.6 (26 Jan 2023 16:18)  HR: 68 (27 Jan 2023 09:05) (65 - 82)  BP: 148/72 (27 Jan 2023 09:05) (111/63 - 149/114)  BP(mean): 76 (26 Jan 2023 17:00) (76 - 140)  RR: 18 (27 Jan 2023 09:05) (15 - 26)  SpO2: 96% (27 Jan 2023 09:05) (95% - 100%)    Parameters below as of 27 Jan 2023 09:05  Patient On (Oxygen Delivery Method): room air        PHYSICAL EXAM:    GENERAL: NAD  CHEST/LUNG: Clear to ausculation bilaterally  HEART: Regular rate and rhythm; S1 S2d  ABDOMEN: Soft, Nontender,  Bowel sounds present  EXTREMITIES: no edema  NERVOUS SYSTEM:  Alert & Oriented X3, Motor Strength 5/5 B/L upper and lower extremities  PSYCH: normal mood, appropriate response.    LABS:                        11.3   10.09 )-----------( 184      ( 27 Jan 2023 06:45 )             34.5     01-27    144  |  112<H>  |  18.2  ----------------------------<  97  3.8   |  21.0<L>  |  0.72    Ca    9.2      27 Jan 2023 06:45  Phos  2.0     01-27  Mg     2.1     01-27    TPro  5.7<L>  /  Alb  3.3  /  TBili  0.7  /  DBili  x   /  AST  23  /  ALT  11  /  AlkPhos  74  01-25    PT/INR - ( 25 Jan 2023 14:30 )   PT: 11.7 sec;   INR: 1.01 ratio         PTT - ( 25 Jan 2023 14:30 )  PTT:24.3 sec      CAPILLARY BLOOD GLUCOSE      POCT Blood Glucose.: 105 mg/dL (27 Jan 2023 07:57)  POCT Blood Glucose.: 116 mg/dL (26 Jan 2023 21:01)  POCT Blood Glucose.: 130 mg/dL (26 Jan 2023 18:32)  POCT Blood Glucose.: 127 mg/dL (26 Jan 2023 16:34)        RADIOLOGY & ADDITIONAL TESTS:

## 2023-01-27 NOTE — DISCHARGE NOTE PROVIDER - CARE PROVIDERS DIRECT ADDRESSES
[email or web address] ,DirectAddress_Unknown,DirectAddress_Unknown,DirectAddress_Unknown

## 2023-01-27 NOTE — DISCHARGE NOTE PROVIDER - NSDCCPCAREPLAN_GEN_ALL_CORE_FT
PRINCIPAL DISCHARGE DIAGNOSIS  Diagnosis: Diverticular hemorrhage  Assessment and Plan of Treatment: outpatient colonoscopy  follow up with GI.      SECONDARY DISCHARGE DIAGNOSES  Diagnosis: Acute diverticulitis  Assessment and Plan of Treatment: finish course of antibiotics    Diagnosis: Peptic ulcer disease  Assessment and Plan of Treatment: twice daily pantoprazole  elective Endoscopy in 4-6 weeks to ensure resolution     PRINCIPAL DISCHARGE DIAGNOSIS  Diagnosis: Diverticular hemorrhage  Assessment and Plan of Treatment: - No active bleeding identified on endoscopy or angiography.   - Hemoglobin stable, diet advanced and well tolerated.   - Follow up outpatient with Dr. Whitaker for further management.      SECONDARY DISCHARGE DIAGNOSES  Diagnosis: Acute diverticulitis  Assessment and Plan of Treatment: - Treated with IV antibiotics. Please take all medicaitons as prescribed.    Diagnosis: Peptic ulcer disease  Assessment and Plan of Treatment: - twice daily pantoprazole  - elective Endoscopy in 4-6 weeks to ensure resolution  -follow up outpatient with GI and your PCP.    Diagnosis: Atrial fibrillation with RVR  Assessment and Plan of Treatment: - Rate now controlled. Anticoagulation held in the setting of acute bleeding.   - Follow up outpatient with your PCP and cardiology for monitoring and further management

## 2023-01-27 NOTE — DIETITIAN INITIAL EVALUATION ADULT - ORAL INTAKE PTA/DIET HISTORY
Patient reports appetite has been poor while on clear liquid diet, now advanced to regular consistencies with low fiber for lunch and dinner. Showed patient how to order to kitchen for meal service to improve PO intake and meal acceptance. Reports she had fair PO intake prior to admission. No weight changes. States she has had no recent changes in food intake or meal patterns. States she is normally with loose stool, however has had recent constipation and started using senna.  Suggested patient avoid commonly binding foods such as bagels and rice, which she noted consuming often. Encouraged patient to eventually / gradually consume adequate fiber and whole grain carbohydrate intake to promote regular BMs. Encouraged adequate fluid intake. Encouraged iron rich / vitamin C rich food sources for GIB associated anemia. Patient in good understanding. RD to remain available.

## 2023-01-27 NOTE — PROGRESS NOTE ADULT - ASSESSMENT
78 y/o F with PMHx of HTN, HLD, hypothyroidism, DM type 2, and asthma who [place] [facility] [place] complaining of large episode of hematochezia. Upon arrival to ER, had two additional episodes. Pt with hgb 13.9 on presentation. Overnight, had an acute drop in hgb (repeat 8.4). Pt received a total of 6u PRBCs, 2u platelets, 2u FFP, and cryo. Underwent EGD on 1/25 which was revealing of clean based gastric and duodenal ulcers, unlikely to explain pt's severe GI bleeding. Pt was transferred to [facility] for CTA abd/pelvis and possible IR intervention in the setting of suspected lower GI bleed. Pt remained intubated s/p EGD. CTA abd/pelvis revealed acute uncomplicated sigmoid diverticulitis, and no site of active GI hemorrhage was identified. extubated in ICU and monitored w/o further gi hemorrhaged. stable for transfer to medicine.    acute blood loss anemia: likely diverticular    PPI BID    advance diet    GI following, outpatient colonoscopy    trend h/h       Peptic ulcer disease: c/w PPI BID    acute diverticulitis: c/w zosyn, transition to cipro/flagyl or augmentin on dc    UC: restart home balsalazide 750 mg oral capsule 3 cap(s) orally once a day   family brought it in     hypothyroid: synthroid.    Dm2: a1c 5.2.     TOV  ongoing    dc in 24-48 hrs if no further gi bleeding and h/h stable

## 2023-01-27 NOTE — PHYSICAL THERAPY INITIAL EVALUATION ADULT - PERTINENT HX OF CURRENT PROBLEM, REHAB EVAL
76 y/o F with PMHx of HTN, HLD, hypothyroidism, DM type 2, and asthma who [place] [facility] [place] of large episode of hematochezia. Upon arrival to ER, had two additional episodes. Pt with hgb 13.9 on presentation. Overnight, had an acute drop in hgb (repeat 8.4). Pt received a total of 6u PRBCs, 2u platelets, 2u FFP, and cryo. Underwent EGD on 1/25 which was revealing of clean based gastric and duodenal ulcers, unlikely to explain pt's severe GI bleeding. Pt was transferred to [facility] for CTA abd/pelvis and possible IR intervention in the setting of suspected lower GI bleed. Pt remained intubated s/p EGD. CTA abd/pelvis revealed acute uncomplicated sigmoid diverticulitis, and no site of active GI hemorrhage was identified. extubated in ICU and monitored w/o further gi hemorrhaged. stable for transfer to medicine.

## 2023-01-27 NOTE — DIETITIAN INITIAL EVALUATION ADULT - SIGNS/SYMPTOMS
as evidenced by meeting <75% estimated nutrition needs x 5 days, moderate muscle/fat loss. as evidenced by meeting <75% estimated nutrition needs x ~7 days, moderate muscle/fat loss.

## 2023-01-27 NOTE — DISCHARGE NOTE PROVIDER - NSDCCAREPROVSEEN_GEN_ALL_CORE_FT
Estefany Dalton Ears: no ear pain and no hearing problems.Nose: no nasal congestion and no nasal drainage.Mouth/Throat: no dysphagia, no hoarseness and no throat pain.Neck: no lumps, no pain, no stiffness and no swollen glands.

## 2023-01-27 NOTE — PROGRESS NOTE ADULT - SUBJECTIVE AND OBJECTIVE BOX
Chief Complaint:  Patient is a 77y old  Female who presents with a chief complaint of Gastrointestinal hemorrhage     (27 Jan 2023 12:30)      Interval Events / Subjective: Patient seen and examined at bedside. She tolerated regular diet. She had a bowel movement in the chair after eating lunch per patient it was dark brown per discussion with RN, bowel movement witnessed by aid and was red blood. She denies nausea, vomiting, abdominal pain or lightheadedness.   She is receiving her balsalazide     REVIEW OF SYSTEMS:   General: Negative  HEENT: Negative  CV: Negative  Respiratory: Negative  GI: See HPI  : Negative  MSK: Negative  Hematologic: Negative  Skin: Negative    MEDICATIONS:   MEDICATIONS  (STANDING):  allopurinol 100 milliGRAM(s) Oral daily  atorvastatin 40 milliGRAM(s) Oral at bedtime  balsalazide 2250 milliGRAM(s) Oral <User Schedule>  chlorhexidine 2% Cloths 1 Application(s) Topical <User Schedule>  cholecalciferol 2000 Unit(s) Oral daily  clotrimazole/betamethasone Cream 1 Application(s) Topical two times a day  cyanocobalamin 1000 MICROGram(s) Oral daily  dextrose 5%. 1000 milliLiter(s) (100 mL/Hr) IV Continuous <Continuous>  dextrose 5%. 1000 milliLiter(s) (50 mL/Hr) IV Continuous <Continuous>  dextrose 50% Injectable 25 Gram(s) IV Push once  dextrose 50% Injectable 12.5 Gram(s) IV Push once  dextrose 50% Injectable 25 Gram(s) IV Push once  fluconAZOLE   Tablet 100 milliGRAM(s) Oral daily  glucagon  Injectable 1 milliGRAM(s) IntraMuscular once  insulin lispro (ADMELOG) corrective regimen sliding scale   SubCutaneous three times a day before meals  levothyroxine 125 MICROGram(s) Oral daily  lisinopril 20 milliGRAM(s) Oral daily  montelukast 10 milliGRAM(s) Oral daily  pantoprazole    Tablet 40 milliGRAM(s) Oral two times a day  piperacillin/tazobactam IVPB.. 3.375 Gram(s) IV Intermittent every 8 hours  potassium phosphate / sodium phosphate Powder (PHOS-NaK) 1 Packet(s) Oral three times a day before meals  torsemide 20 milliGRAM(s) Oral daily    MEDICATIONS  (PRN):  albuterol    90 MICROgram(s) HFA Inhaler 2 Puff(s) Inhalation every 6 hours PRN Shortness of Breath and/or Wheezing  dextrose Oral Gel 15 Gram(s) Oral once PRN Blood Glucose LESS THAN 70 milliGRAM(s)/deciliter  ondansetron Injectable 4 milliGRAM(s) IV Push every 4 hours PRN Nausea and/or Vomiting  oxycodone    5 mG/acetaminophen 325 mG 2 Tablet(s) Oral every 6 hours PRN mod to severe pain      ALLERGIES:   Allergies    No Known Allergies    Intolerances        VITAL SIGNS:   Vital Signs Last 24 Hrs  T(C): 36.2 (27 Jan 2023 09:05), Max: 37 (26 Jan 2023 16:18)  T(F): 97.2 (27 Jan 2023 09:05), Max: 98.6 (26 Jan 2023 16:18)  HR: 68 (27 Jan 2023 09:05) (65 - 74)  BP: 148/72 (27 Jan 2023 09:05) (111/63 - 148/78)  BP(mean): 76 (26 Jan 2023 17:00) (76 - 81)  RR: 18 (27 Jan 2023 09:05) (15 - 22)  SpO2: 96% (27 Jan 2023 09:05) (95% - 100%)    Parameters below as of 27 Jan 2023 09:05  Patient On (Oxygen Delivery Method): room air      I&O's Summary    26 Jan 2023 07:01  -  27 Jan 2023 07:00  --------------------------------------------------------  IN: 0 mL / OUT: 525 mL / NET: -525 mL        PHYSICAL EXAM:   GENERAL:  No acute distress  HEENT:  NC/AT,  conjunctivae clear, sclera -anicteric  CHEST:  Full & symmetric excursion, no increased effort, breath sounds clear  HEART:  Regular rate regular   ABDOMEN:  Soft, non-tender, non-distended, normoactive bowel sounds,  no rebound or guarding  EXTREMITIES: No edema  SKIN:  warm/dry  NEURO:  calm, cooperative    LABS:  CBC Full  -  ( 27 Jan 2023 06:45 )  WBC Count : 10.09 K/uL  RBC Count : 3.87 M/uL  Hemoglobin : 11.3 g/dL  Hematocrit : 34.5 %  Platelet Count - Automated : 184 K/uL  Mean Cell Volume : 89.1 fl  Mean Cell Hemoglobin : 29.2 pg  Mean Cell Hemoglobin Concentration : 32.8 gm/dL  Auto Neutrophil # : 6.97 K/uL  Auto Lymphocyte # : 2.08 K/uL  Auto Monocyte # : 0.72 K/uL  Auto Eosinophil # : 0.26 K/uL  Auto Basophil # : 0.03 K/uL  Auto Neutrophil % : 69.1 %  Auto Lymphocyte % : 20.6 %  Auto Monocyte % : 7.1 %  Auto Eosinophil % : 2.6 %  Auto Basophil % : 0.3 %    01-27    144  |  112<H>  |  18.2  ----------------------------<  97  3.8   |  21.0<L>  |  0.72    Ca    9.2      27 Jan 2023 06:45  Phos  2.0     01-27  Mg     2.1     01-27    TPro  5.7<L>  /  Alb  3.3  /  TBili  0.7  /  DBili  x   /  AST  23  /  ALT  11  /  AlkPhos  74  01-25    LIVER FUNCTIONS - ( 25 Jan 2023 14:30 )  Alb: 3.3 g/dL / Pro: 5.7 g/dL / ALK PHOS: 74 U/L / ALT: 11 U/L / AST: 23 U/L / GGT: x           PT/INR - ( 25 Jan 2023 14:30 )   PT: 11.7 sec;   INR: 1.01 ratio         PTT - ( 25 Jan 2023 14:30 )  PTT:24.3 sec

## 2023-01-27 NOTE — DISCHARGE NOTE PROVIDER - NSDCMRMEDTOKEN_GEN_ALL_CORE_FT
Albuterol (Eqv-ProAir HFA) 90 mcg/inh inhalation aerosol: 2 puff(s) inhaled every 6 hours, As Needed  allopurinol 100 mg oral tablet: 1 tab(s) orally once a day  Aspir 81 oral delayed release tablet: 1 tab(s) orally once a day  atorvastatin 40 mg oral tablet: 1 tab(s) orally once a day  balsalazide 750 mg oral capsule: 3 cap(s) orally once a day  empagliflozin 25 mg oral tablet: 1 tab(s) orally once a day (in the morning)  fluconazole 100 mg oral tablet: 1 tab(s) orally once a day  filled 1/10/23 x 30 day supply  levothyroxine 125 mcg (0.125 mg) oral tablet: 1 tab(s) orally once a day  lisinopril 40 mg oral tablet: 1 tab(s) orally once a day  montelukast 10 mg oral tablet: 1 tab(s) orally once a day  torsemide 20 mg oral tablet: 1 tab(s) orally once a day  tramadol-acetaminophen 37.5 mg-325 mg oral tablet: 1 tab(s) orally every 6 hours, As Needed  filled 1/10/23 x 15 day supply   Albuterol (Eqv-ProAir HFA) 90 mcg/inh inhalation aerosol: 2 puff(s) inhaled every 6 hours, As Needed  allopurinol 100 mg oral tablet: 1 tab(s) orally once a day  Aspir 81 oral delayed release tablet: 1 tab(s) orally once a day  atorvastatin 40 mg oral tablet: 1 tab(s) orally once a day  balsalazide 750 mg oral capsule: 3 cap(s) orally once a day  empagliflozin 25 mg oral tablet: 1 tab(s) orally once a day (in the morning)  fluconazole 100 mg oral tablet: 1 tab(s) orally once a day  filled 1/10/23 x 30 day supply  levothyroxine 125 mcg (0.125 mg) oral tablet: 1 tab(s) orally once a day  lisinopril 40 mg oral tablet: 1 tab(s) orally once a day  montelukast 10 mg oral tablet: 1 tab(s) orally once a day  Protonix 40 mg oral delayed release tablet: 1 tab(s) orally 2 times a day   torsemide 20 mg oral tablet: 1 tab(s) orally once a day  tramadol-acetaminophen 37.5 mg-325 mg oral tablet: 1 tab(s) orally every 6 hours, As Needed  filled 1/10/23 x 15 day supply   Albuterol (Eqv-ProAir HFA) 90 mcg/inh inhalation aerosol: 2 puff(s) inhaled every 6 hours, As Needed  allopurinol 100 mg oral tablet: 1 tab(s) orally once a day  atorvastatin 40 mg oral tablet: 1 tab(s) orally once a day  balsalazide 750 mg oral capsule: 3 cap(s) orally once a day  empagliflozin 25 mg oral tablet: 1 tab(s) orally once a day (in the morning)  fluconazole 100 mg oral tablet: 1 tab(s) orally once a day  filled 1/10/23 x 30 day supply  levothyroxine 125 mcg (0.125 mg) oral tablet: 1 tab(s) orally once a day  lisinopril 40 mg oral tablet: 1 tab(s) orally once a day  montelukast 10 mg oral tablet: 1 tab(s) orally once a day  Protonix 40 mg oral delayed release tablet: 1 tab(s) orally 2 times a day   torsemide 20 mg oral tablet: 1 tab(s) orally once a day  tramadol-acetaminophen 37.5 mg-325 mg oral tablet: 1 tab(s) orally every 6 hours, As Needed  filled 1/10/23 x 15 day supply

## 2023-01-27 NOTE — DISCHARGE NOTE PROVIDER - PROVIDER TOKENS
PROVIDER:[TOKEN:[[id number]]] PROVIDER:[TOKEN:[[id number]],FOLLOWUP:[1-3 days]],FREE:[LAST:[PCP],PHONE:[(   )    -],FAX:[(   )    -],FOLLOWUP:[1-3 days]],PROVIDER:[TOKEN:[[id number]],FOLLOWUP:[1-3 days]]

## 2023-01-27 NOTE — DISCHARGE NOTE PROVIDER - DETAILS OF MALNUTRITION DIAGNOSIS/DIAGNOSES
This patient has been assessed with a concern for Malnutrition and was treated during this hospitalization for the following Nutrition diagnosis/diagnoses:     -  01/27/2023: Moderate protein-calorie malnutrition

## 2023-01-27 NOTE — DISCHARGE NOTE PROVIDER - CARE PROVIDER_API CALL
Alexi Jones)  Gastroenterology; Internal Medicine  [address] [place] [address]  Phone: [phone]  Fax: [phone]  Follow Up Time:    Jf Whitaker  GASTROENTEROLOGY  [address] [place] [address]  Phone: [phone]  Fax: [phone]  Follow Up Time: 1-3 days    PCP,   Phone: (   )    -  Fax: (   )    -  Follow Up Time: 1-3 days    Cuba Marie)  Cardiovascular Disease; Internal Medicine; Interventional Cardiology  [address] [place] [address]  Phone: [phone]  Fax: [phone]  Follow Up Time: 1-3 days

## 2023-01-28 ENCOUNTER — TRANSCRIPTION ENCOUNTER (OUTPATIENT)
Age: 78
End: 2023-01-28

## 2023-01-28 DIAGNOSIS — I48.91 UNSPECIFIED ATRIAL FIBRILLATION: ICD-10-CM

## 2023-01-28 LAB
ALBUMIN SERPL ELPH-MCNC: 4 G/DL — SIGNIFICANT CHANGE UP (ref 3.3–5.2)
ALP SERPL-CCNC: 68 U/L — SIGNIFICANT CHANGE UP (ref 40–120)
ALT FLD-CCNC: 11 U/L — SIGNIFICANT CHANGE UP
ANION GAP SERPL CALC-SCNC: 10 MMOL/L — SIGNIFICANT CHANGE UP (ref 5–17)
ANION GAP SERPL CALC-SCNC: 11 MMOL/L — SIGNIFICANT CHANGE UP (ref 5–17)
APTT BLD: 26.8 SEC — LOW (ref 27.5–35.5)
AST SERPL-CCNC: 20 U/L — SIGNIFICANT CHANGE UP
BILIRUB SERPL-MCNC: 0.6 MG/DL — SIGNIFICANT CHANGE UP (ref 0.4–2)
BLD GP AB SCN SERPL QL: SIGNIFICANT CHANGE UP
BUN SERPL-MCNC: 15 MG/DL — SIGNIFICANT CHANGE UP (ref 8–20)
BUN SERPL-MCNC: 15.3 MG/DL — SIGNIFICANT CHANGE UP (ref 8–20)
CALCIUM SERPL-MCNC: 8.1 MG/DL — LOW (ref 8.4–10.5)
CALCIUM SERPL-MCNC: 9.3 MG/DL — SIGNIFICANT CHANGE UP (ref 8.4–10.5)
CHLORIDE SERPL-SCNC: 106 MMOL/L — SIGNIFICANT CHANGE UP (ref 96–108)
CHLORIDE SERPL-SCNC: 113 MMOL/L — HIGH (ref 96–108)
CO2 SERPL-SCNC: 21 MMOL/L — LOW (ref 22–29)
CO2 SERPL-SCNC: 28 MMOL/L — SIGNIFICANT CHANGE UP (ref 22–29)
CREAT SERPL-MCNC: 0.82 MG/DL — SIGNIFICANT CHANGE UP (ref 0.5–1.3)
CREAT SERPL-MCNC: 0.83 MG/DL — SIGNIFICANT CHANGE UP (ref 0.5–1.3)
EGFR: 73 ML/MIN/1.73M2 — SIGNIFICANT CHANGE UP
EGFR: 74 ML/MIN/1.73M2 — SIGNIFICANT CHANGE UP
GLUCOSE BLDC GLUCOMTR-MCNC: 107 MG/DL — HIGH (ref 70–99)
GLUCOSE BLDC GLUCOMTR-MCNC: 118 MG/DL — HIGH (ref 70–99)
GLUCOSE BLDC GLUCOMTR-MCNC: 139 MG/DL — HIGH (ref 70–99)
GLUCOSE SERPL-MCNC: 117 MG/DL — HIGH (ref 70–99)
GLUCOSE SERPL-MCNC: 148 MG/DL — HIGH (ref 70–99)
HCT VFR BLD CALC: 26.6 % — LOW (ref 34.5–45)
HCT VFR BLD CALC: 29.1 % — LOW (ref 34.5–45)
HCT VFR BLD CALC: 31 % — LOW (ref 34.5–45)
HCT VFR BLD CALC: 34.4 % — LOW (ref 34.5–45)
HGB BLD-MCNC: 10.4 G/DL — LOW (ref 11.5–15.5)
HGB BLD-MCNC: 11.1 G/DL — LOW (ref 11.5–15.5)
HGB BLD-MCNC: 8.6 G/DL — LOW (ref 11.5–15.5)
HGB BLD-MCNC: 9.2 G/DL — LOW (ref 11.5–15.5)
INR BLD: 1.07 RATIO — SIGNIFICANT CHANGE UP (ref 0.88–1.16)
MAGNESIUM SERPL-MCNC: 1.7 MG/DL — SIGNIFICANT CHANGE UP (ref 1.6–2.6)
MCHC RBC-ENTMCNC: 29.3 PG — SIGNIFICANT CHANGE UP (ref 27–34)
MCHC RBC-ENTMCNC: 29.3 PG — SIGNIFICANT CHANGE UP (ref 27–34)
MCHC RBC-ENTMCNC: 29.6 PG — SIGNIFICANT CHANGE UP (ref 27–34)
MCHC RBC-ENTMCNC: 29.8 PG — SIGNIFICANT CHANGE UP (ref 27–34)
MCHC RBC-ENTMCNC: 31.6 GM/DL — LOW (ref 32–36)
MCHC RBC-ENTMCNC: 32.3 GM/DL — SIGNIFICANT CHANGE UP (ref 32–36)
MCHC RBC-ENTMCNC: 32.3 GM/DL — SIGNIFICANT CHANGE UP (ref 32–36)
MCHC RBC-ENTMCNC: 33.5 GM/DL — SIGNIFICANT CHANGE UP (ref 32–36)
MCV RBC AUTO: 88.8 FL — SIGNIFICANT CHANGE UP (ref 80–100)
MCV RBC AUTO: 90.8 FL — SIGNIFICANT CHANGE UP (ref 80–100)
MCV RBC AUTO: 91.4 FL — SIGNIFICANT CHANGE UP (ref 80–100)
MCV RBC AUTO: 92.7 FL — SIGNIFICANT CHANGE UP (ref 80–100)
PHOSPHATE SERPL-MCNC: 3.8 MG/DL — SIGNIFICANT CHANGE UP (ref 2.4–4.7)
PLATELET # BLD AUTO: 165 K/UL — SIGNIFICANT CHANGE UP (ref 150–400)
PLATELET # BLD AUTO: 175 K/UL — SIGNIFICANT CHANGE UP (ref 150–400)
PLATELET # BLD AUTO: 183 K/UL — SIGNIFICANT CHANGE UP (ref 150–400)
PLATELET # BLD AUTO: 184 K/UL — SIGNIFICANT CHANGE UP (ref 150–400)
POTASSIUM SERPL-MCNC: 3.7 MMOL/L — SIGNIFICANT CHANGE UP (ref 3.5–5.3)
POTASSIUM SERPL-MCNC: 3.9 MMOL/L — SIGNIFICANT CHANGE UP (ref 3.5–5.3)
POTASSIUM SERPL-SCNC: 3.7 MMOL/L — SIGNIFICANT CHANGE UP (ref 3.5–5.3)
POTASSIUM SERPL-SCNC: 3.9 MMOL/L — SIGNIFICANT CHANGE UP (ref 3.5–5.3)
PROT SERPL-MCNC: 6.3 G/DL — LOW (ref 6.6–8.7)
PROTHROM AB SERPL-ACNC: 12.4 SEC — SIGNIFICANT CHANGE UP (ref 10.5–13.4)
RBC # BLD: 2.91 M/UL — LOW (ref 3.8–5.2)
RBC # BLD: 3.14 M/UL — LOW (ref 3.8–5.2)
RBC # BLD: 3.49 M/UL — LOW (ref 3.8–5.2)
RBC # BLD: 3.79 M/UL — LOW (ref 3.8–5.2)
RBC # FLD: 14.7 % — HIGH (ref 10.3–14.5)
RBC # FLD: 14.7 % — HIGH (ref 10.3–14.5)
RBC # FLD: 14.9 % — HIGH (ref 10.3–14.5)
RBC # FLD: 15 % — HIGH (ref 10.3–14.5)
SODIUM SERPL-SCNC: 144 MMOL/L — SIGNIFICANT CHANGE UP (ref 135–145)
SODIUM SERPL-SCNC: 145 MMOL/L — SIGNIFICANT CHANGE UP (ref 135–145)
TROPONIN T SERPL-MCNC: <0.01 NG/ML — SIGNIFICANT CHANGE UP (ref 0–0.06)
TSH SERPL-MCNC: 0.65 UIU/ML — SIGNIFICANT CHANGE UP (ref 0.27–4.2)
WBC # BLD: 7.44 K/UL — SIGNIFICANT CHANGE UP (ref 3.8–10.5)
WBC # BLD: 8.19 K/UL — SIGNIFICANT CHANGE UP (ref 3.8–10.5)
WBC # BLD: 9.08 K/UL — SIGNIFICANT CHANGE UP (ref 3.8–10.5)
WBC # BLD: 9.31 K/UL — SIGNIFICANT CHANGE UP (ref 3.8–10.5)
WBC # FLD AUTO: 7.44 K/UL — SIGNIFICANT CHANGE UP (ref 3.8–10.5)
WBC # FLD AUTO: 8.19 K/UL — SIGNIFICANT CHANGE UP (ref 3.8–10.5)
WBC # FLD AUTO: 9.08 K/UL — SIGNIFICANT CHANGE UP (ref 3.8–10.5)
WBC # FLD AUTO: 9.31 K/UL — SIGNIFICANT CHANGE UP (ref 3.8–10.5)

## 2023-01-28 PROCEDURE — 76937 US GUIDE VASCULAR ACCESS: CPT | Mod: 26

## 2023-01-28 PROCEDURE — 36246 INS CATH ABD/L-EXT ART 2ND: CPT | Mod: LT

## 2023-01-28 PROCEDURE — 99233 SBSQ HOSP IP/OBS HIGH 50: CPT

## 2023-01-28 PROCEDURE — 75774 ARTERY X-RAY EACH VESSEL: CPT | Mod: 26

## 2023-01-28 PROCEDURE — 74178 CT ABD&PLV WO CNTR FLWD CNTR: CPT | Mod: 26

## 2023-01-28 PROCEDURE — 99222 1ST HOSP IP/OBS MODERATE 55: CPT

## 2023-01-28 PROCEDURE — 75726 ARTERY X-RAYS ABDOMEN: CPT | Mod: 26

## 2023-01-28 PROCEDURE — 93010 ELECTROCARDIOGRAM REPORT: CPT

## 2023-01-28 RX ORDER — SODIUM CHLORIDE 9 MG/ML
500 INJECTION INTRAMUSCULAR; INTRAVENOUS; SUBCUTANEOUS ONCE
Refills: 0 | Status: COMPLETED | OUTPATIENT
Start: 2023-01-28 | End: 2023-01-28

## 2023-01-28 RX ORDER — SODIUM CHLORIDE 9 MG/ML
1000 INJECTION INTRAMUSCULAR; INTRAVENOUS; SUBCUTANEOUS
Refills: 0 | Status: COMPLETED | OUTPATIENT
Start: 2023-01-28 | End: 2023-01-29

## 2023-01-28 RX ORDER — AMIODARONE HYDROCHLORIDE 400 MG/1
150 TABLET ORAL ONCE
Refills: 0 | Status: COMPLETED | OUTPATIENT
Start: 2023-01-28 | End: 2023-01-28

## 2023-01-28 RX ORDER — SODIUM CHLORIDE 9 MG/ML
500 INJECTION INTRAMUSCULAR; INTRAVENOUS; SUBCUTANEOUS ONCE
Refills: 0 | Status: DISCONTINUED | OUTPATIENT
Start: 2023-01-28 | End: 2023-01-28

## 2023-01-28 RX ORDER — PANTOPRAZOLE SODIUM 20 MG/1
40 TABLET, DELAYED RELEASE ORAL
Refills: 0 | Status: DISCONTINUED | OUTPATIENT
Start: 2023-01-28 | End: 2023-02-06

## 2023-01-28 RX ORDER — DILTIAZEM HCL 120 MG
5 CAPSULE, EXT RELEASE 24 HR ORAL ONCE
Refills: 0 | Status: DISCONTINUED | OUTPATIENT
Start: 2023-01-28 | End: 2023-01-28

## 2023-01-28 RX ORDER — METOPROLOL TARTRATE 50 MG
5 TABLET ORAL ONCE
Refills: 0 | Status: COMPLETED | OUTPATIENT
Start: 2023-01-28 | End: 2023-01-28

## 2023-01-28 RX ADMIN — Medication 2000 UNIT(S): at 14:51

## 2023-01-28 RX ADMIN — ATORVASTATIN CALCIUM 40 MILLIGRAM(S): 80 TABLET, FILM COATED ORAL at 21:10

## 2023-01-28 RX ADMIN — CLOTRIMAZOLE AND BETAMETHASONE DIPROPIONATE 1 APPLICATION(S): 10; .5 CREAM TOPICAL at 05:44

## 2023-01-28 RX ADMIN — Medication 5 MILLIGRAM(S): at 22:21

## 2023-01-28 RX ADMIN — PIPERACILLIN AND TAZOBACTAM 25 GRAM(S): 4; .5 INJECTION, POWDER, LYOPHILIZED, FOR SOLUTION INTRAVENOUS at 05:46

## 2023-01-28 RX ADMIN — SODIUM CHLORIDE 1000 MILLILITER(S): 9 INJECTION INTRAMUSCULAR; INTRAVENOUS; SUBCUTANEOUS at 22:37

## 2023-01-28 RX ADMIN — Medication 125 MICROGRAM(S): at 05:45

## 2023-01-28 RX ADMIN — Medication 100 MILLIGRAM(S): at 14:52

## 2023-01-28 RX ADMIN — Medication 1 PACKET(S): at 05:45

## 2023-01-28 RX ADMIN — CLOTRIMAZOLE AND BETAMETHASONE DIPROPIONATE 1 APPLICATION(S): 10; .5 CREAM TOPICAL at 18:09

## 2023-01-28 RX ADMIN — PIPERACILLIN AND TAZOBACTAM 25 GRAM(S): 4; .5 INJECTION, POWDER, LYOPHILIZED, FOR SOLUTION INTRAVENOUS at 21:10

## 2023-01-28 RX ADMIN — PANTOPRAZOLE SODIUM 40 MILLIGRAM(S): 20 TABLET, DELAYED RELEASE ORAL at 05:45

## 2023-01-28 RX ADMIN — LISINOPRIL 20 MILLIGRAM(S): 2.5 TABLET ORAL at 05:45

## 2023-01-28 RX ADMIN — Medication 20 MILLIGRAM(S): at 05:44

## 2023-01-28 RX ADMIN — PANTOPRAZOLE SODIUM 40 MILLIGRAM(S): 20 TABLET, DELAYED RELEASE ORAL at 18:22

## 2023-01-28 RX ADMIN — SODIUM CHLORIDE 100 MILLILITER(S): 9 INJECTION INTRAMUSCULAR; INTRAVENOUS; SUBCUTANEOUS at 18:48

## 2023-01-28 RX ADMIN — AMIODARONE HYDROCHLORIDE 412 MILLIGRAM(S): 400 TABLET ORAL at 23:12

## 2023-01-28 RX ADMIN — FLUCONAZOLE 100 MILLIGRAM(S): 150 TABLET ORAL at 14:52

## 2023-01-28 RX ADMIN — Medication 1 PACKET(S): at 14:54

## 2023-01-28 RX ADMIN — CHLORHEXIDINE GLUCONATE 1 APPLICATION(S): 213 SOLUTION TOPICAL at 05:44

## 2023-01-28 NOTE — CHART NOTE - NSCHARTNOTEFT_GEN_A_CORE
77F transferred from [facility] to [facility] for LGIB 1/25.     CTA today shows active extrav from right hepatic flexure diverticulum. Hgb falling for two days. Multiple BRBPR today, SBP coming down as well.     GI unable to treat.     Plan:  -IR angiogram with possible embolization today 77F transferred from [facility] to [facility] for LGIB 1/25. Stable since until this AM.     CTA today shows active extrav from right hepatic flexure diverticulum. Hgb falling for two days. Multiple BRBPR today, SBP coming down as well.     GI unable to treat.     Plan:  -IR angiogram with possible embolization today

## 2023-01-28 NOTE — CONSULT NOTE ADULT - NS PANP COMMENT GEN_ALL_CORE FT
77F with PMHx of HTN, HLD, hypothyroidism, DM type 2, asthma, Ulcerative Colitis initially presented 1/25 as a transfer from [facility] where she presented with hematochezia and received 6u pRBC, 2u plt, 2u FFP and cryo with EGD showing clean based gastric and duodenal ulcers. Pt transferred to [facility] intubated for CT angio with possible IR intervention. Initial CT angio was negative for extravasation. Pt was extubated and given stable Hgb was downgraded to GMF 1/26. Today pt was noted to recurrent episodes of hematochezia with downtrending Hgb. CT angio repeated and found to have extravasation in area of R sided diverticulum. IR was consulted and pt underwent angiogram with no bleeding source identified. Pt upgraded to SDU. Seen post procedure and was slightly groggy in setting of recent sedatives but answering appropriately and denied any acute complaints including chest pain/abdominal pain. SBP at time of evaluation improved to 90s/50s and no recurrent hematochezia since return from procedure. Pt was ordered for fluid bolus that was being given at time of evaluation. Pt did receive lasix and lisinopril in AM.     pt can remain in SDU if hemodynamics remain stable with ongoing fluid resuscitation   please ensure 2 large bore IVs   hold lasix/lisinopril   transfuse for goal Hgb >7 or with active bleeding   trend cbc q4-6   f/u GI recs   check lactate   continue abx for diverticulitis   can obtain surgery consult in the event of refractory diverticular bleeding   please call back with any questions/concerns or worsening hemodynamics

## 2023-01-28 NOTE — PROGRESS NOTE ADULT - SUBJECTIVE AND OBJECTIVE BOX
[facility] Division of Hospital Medicine    Chief Complaint: diverticular bleed     SUBJECTIVE / OVERNIGHT EVENTS: Overnight, patient had an episode of diverticular bleed. Denies n/v/f/c/h/d and abdominal pain.     Patient denies chest pain, SOB, abd pain, N/V, fever, chills, dysuria or any other complaints. All remainder ROS negative.     MEDICATIONS  (STANDING):  allopurinol 100 milliGRAM(s) Oral daily  atorvastatin 40 milliGRAM(s) Oral at bedtime  balsalazide 2250 milliGRAM(s) Oral <User Schedule>  chlorhexidine 2% Cloths 1 Application(s) Topical <User Schedule>  cholecalciferol 2000 Unit(s) Oral daily  clotrimazole/betamethasone Cream 1 Application(s) Topical two times a day  cyanocobalamin 1000 MICROGram(s) Oral daily  dextrose 5%. 1000 milliLiter(s) (100 mL/Hr) IV Continuous <Continuous>  dextrose 5%. 1000 milliLiter(s) (50 mL/Hr) IV Continuous <Continuous>  dextrose 50% Injectable 25 Gram(s) IV Push once  dextrose 50% Injectable 12.5 Gram(s) IV Push once  dextrose 50% Injectable 25 Gram(s) IV Push once  fluconAZOLE   Tablet 100 milliGRAM(s) Oral daily  glucagon  Injectable 1 milliGRAM(s) IntraMuscular once  insulin lispro (ADMELOG) corrective regimen sliding scale   SubCutaneous three times a day before meals  levothyroxine 125 MICROGram(s) Oral daily  lisinopril 20 milliGRAM(s) Oral daily  montelukast 10 milliGRAM(s) Oral daily  pantoprazole    Tablet 40 milliGRAM(s) Oral two times a day  piperacillin/tazobactam IVPB.. 3.375 Gram(s) IV Intermittent every 8 hours  potassium phosphate / sodium phosphate Powder (PHOS-NaK) 1 Packet(s) Oral three times a day before meals  sodium chloride 0.9% Bolus 500 milliLiter(s) IV Bolus once  torsemide 20 milliGRAM(s) Oral daily    MEDICATIONS  (PRN):  albuterol    90 MICROgram(s) HFA Inhaler 2 Puff(s) Inhalation every 6 hours PRN Shortness of Breath and/or Wheezing  dextrose Oral Gel 15 Gram(s) Oral once PRN Blood Glucose LESS THAN 70 milliGRAM(s)/deciliter  ondansetron Injectable 4 milliGRAM(s) IV Push every 4 hours PRN Nausea and/or Vomiting  oxycodone    5 mG/acetaminophen 325 mG 2 Tablet(s) Oral every 6 hours PRN mod to severe pain        I&O's Summary    27 Jan 2023 07:01  -  28 Jan 2023 07:00  --------------------------------------------------------  IN: 240 mL / OUT: 0 mL / NET: 240 mL        PHYSICAL EXAM:  Vital Signs Last 24 Hrs  T(C): 36.8 (28 Jan 2023 09:51), Max: 37.1 (28 Jan 2023 05:39)  T(F): 98.2 (28 Jan 2023 09:51), Max: 98.7 (28 Jan 2023 05:39)  HR: 87 (28 Jan 2023 13:28) (61 - 87)  BP: 111/73 (28 Jan 2023 13:28) (104/68 - 158/80)  BP(mean): --  RR: 18 (28 Jan 2023 13:28) (17 - 18)  SpO2: 100% (28 Jan 2023 13:28) (95% - 100%)    Parameters below as of 28 Jan 2023 09:51  Patient On (Oxygen Delivery Method): room air    GENERAL: NAD  CHEST/LUNG: Clear to ausculation bilaterally  HEART: Regular rate and rhythm; S1 S2d  ABDOMEN: Soft, Nontender,  Bowel sounds present  EXTREMITIES: no edema  NERVOUS SYSTEM:  Alert & Oriented X3, Motor Strength 5/5 B/L upper and lower extremities  PSYCH: normal mood, appropriate response.    LABS:                        10.4   9.08  )-----------( 184      ( 28 Jan 2023 12:20 )             31.0     01-28    145  |  106  |  15.0  ----------------------------<  117<H>  3.7   |  28.0  |  0.83    Ca    9.3      28 Jan 2023 10:30  Phos  2.0     01-27  Mg     2.1     01-27    TPro  6.3<L>  /  Alb  4.0  /  TBili  0.6  /  DBili  x   /  AST  20  /  ALT  11  /  AlkPhos  68  01-28              CAPILLARY BLOOD GLUCOSE      POCT Blood Glucose.: 107 mg/dL (28 Jan 2023 12:27)  POCT Blood Glucose.: 118 mg/dL (28 Jan 2023 07:45)  POCT Blood Glucose.: 126 mg/dL (27 Jan 2023 21:29)  POCT Blood Glucose.: 136 mg/dL (27 Jan 2023 16:50)        RADIOLOGY & ADDITIONAL TESTS:  Results Reviewed:   Imaging Personally Reviewed:  < from: CT Abdomen and Pelvis w/wo IV Cont (01.28.23 @ 12:52) >  IMPRESSION:    A site of active gastrointestinal bleeding is identified in the right colon in the region of a right-sided colonic diverticulum (62-66; 8 and   61-65; 11).    There has been an increase in the amount of liquid and semisolid stool in the colon.    There has otherwise been no significant change from the recent prior CTA  study of 1/25/2023.    < end of copied text >      Electrocardiogram Personally Reviewed:

## 2023-01-28 NOTE — CHART NOTE - NSCHARTNOTEFT_GEN_A_CORE
Pt seen and examined at bedside for Afib RVR sustaining > 130.   Also with one large dark bloody BM.   Denies chest pain, SOB, n/v/d/c, abdominal pain, HA, dizziness, blurry vision.   Denies having a cardiologist or cardiac hx.     Vital Signs Last 24 Hrs  T(C): 36.6 (28 Jan 2023 20:00), Max: 37.1 (28 Jan 2023 05:39)  T(F): 97.9 (28 Jan 2023 20:00), Max: 98.7 (28 Jan 2023 05:39)  HR: 96 (28 Jan 2023 21:42) (61 - 96)  BP: 113/90 (28 Jan 2023 21:42) (93/56 - 158/80)  BP(mean): 94 (28 Jan 2023 21:42) (69 - 94)  RR: 20 (28 Jan 2023 21:42) (17 - 23)  SpO2: 97% (28 Jan 2023 21:42) (94% - 100%)    Parameters below as of 28 Jan 2023 21:42  Patient On (Oxygen Delivery Method): room air    PHYSICAL EXAM:  GENERAL: NAD, lying in bed comfortably  HEAD:  Atraumatic, Normocephalic  EYES: EOMI, conjunctiva and sclera clear  CHEST/LUNG: Unlabored respirations  HEART: Irregular, +S1, S2  EXTREMITIES:  2+ Peripheral Pulses, brisk capillary refill. No clubbing, cyanosis, or edema  NERVOUS SYSTEM:  Alert & Oriented X3, speech clear. Answers questions appropriately. No focal neurological deficits   MSK: FROM all 4 extremities, full and equal strength  SKIN: Warm, dry    A/P:    New onset AFib in setting of diverticular bleed  -EKG  -CBC, BMP, Mag, Phos, TSH, Trop  -5mg IV lopressor with no improvement in HR, BP dropped to 86/48  -500cc NS bolus ordered  -[id number] 5; however defer full AC at this time in setting of active GIB  -Cardiology consulted Pt seen and examined at bedside for Afib RVR sustaining > 130.   Also with one large dark bloody BM.   Denies chest pain, SOB, n/v/d/c, abdominal pain, HA, dizziness, blurry vision.   Denies having a cardiologist or cardiac hx.     Vital Signs Last 24 Hrs  T(C): 36.6 (28 Jan 2023 20:00), Max: 37.1 (28 Jan 2023 05:39)  T(F): 97.9 (28 Jan 2023 20:00), Max: 98.7 (28 Jan 2023 05:39)  HR: 96 (28 Jan 2023 21:42) (61 - 96)  BP: 113/90 (28 Jan 2023 21:42) (93/56 - 158/80)  BP(mean): 94 (28 Jan 2023 21:42) (69 - 94)  RR: 20 (28 Jan 2023 21:42) (17 - 23)  SpO2: 97% (28 Jan 2023 21:42) (94% - 100%)    Parameters below as of 28 Jan 2023 21:42  Patient On (Oxygen Delivery Method): room air    PHYSICAL EXAM:  GENERAL: NAD, lying in bed comfortably  HEAD:  Atraumatic, Normocephalic  EYES: EOMI, conjunctiva and sclera clear  CHEST/LUNG: Unlabored respirations  HEART: Irregular, +S1, S2  EXTREMITIES:  2+ Peripheral Pulses, brisk capillary refill. No clubbing, cyanosis, or edema  NERVOUS SYSTEM:  Alert & Oriented X3, speech clear. Answers questions appropriately. No focal neurological deficits   MSK: FROM all 4 extremities, full and equal strength  SKIN: Warm, dry    A/P:    New onset AFib in setting of diverticular bleed  -EKG  -CBC, BMP, Mag, Phos, TSH, Trop  -5mg IV lopressor with no improvement in HR, BP dropped to 86/48  -500cc NS bolus ordered, BP remains hypotensive  -[id number] 5; however defer full AC at this time in setting of active GIB  -Cardiology consulted  -150mg amiodarone Pt seen and examined at bedside for Afib RVR sustaining > 130.   Also with one large dark bloody BM.   Denies chest pain, SOB, n/v/d/c, abdominal pain, HA, dizziness, blurry vision.   Denies having a cardiologist or cardiac hx.     Vital Signs Last 24 Hrs  T(C): 36.6 (28 Jan 2023 20:00), Max: 37.1 (28 Jan 2023 05:39)  T(F): 97.9 (28 Jan 2023 20:00), Max: 98.7 (28 Jan 2023 05:39)  HR: 96 (28 Jan 2023 21:42) (61 - 96)  BP: 113/90 (28 Jan 2023 21:42) (93/56 - 158/80)  BP(mean): 94 (28 Jan 2023 21:42) (69 - 94)  RR: 20 (28 Jan 2023 21:42) (17 - 23)  SpO2: 97% (28 Jan 2023 21:42) (94% - 100%)    Parameters below as of 28 Jan 2023 21:42  Patient On (Oxygen Delivery Method): room air    PHYSICAL EXAM:  GENERAL: NAD, lying in bed comfortably  HEAD:  Atraumatic, Normocephalic  EYES: EOMI, conjunctiva and sclera clear  CHEST/LUNG: Unlabored respirations  HEART: Irregular, +S1, S2  EXTREMITIES:  2+ Peripheral Pulses, brisk capillary refill. No clubbing, cyanosis, or edema  NERVOUS SYSTEM:  Alert & Oriented X3, speech clear. Answers questions appropriately. No focal neurological deficits   MSK: FROM all 4 extremities, full and equal strength  SKIN: Warm, dry    A/P:    New onset AFib in setting of diverticular bleed  -EKG  -CBC, BMP, Mag, Phos, TSH, Trop  -5mg IV lopressor with no improvement in HR, BP dropped to 86/48  -500cc NS bolus ordered, BP remains hypotensive  -[id number] 5; however defer full AC at this time in setting of active GIB  -TTE ordered  -Cardiology consulted  -150mg amiodarone Pt seen and examined at bedside for Afib RVR sustaining > 130.   Also with one large dark bloody BM.   Denies chest pain, SOB, n/v/d/c, abdominal pain, HA, dizziness, blurry vision.   Denies having a cardiologist or cardiac hx.     Vital Signs Last 24 Hrs  T(C): 36.6 (28 Jan 2023 20:00), Max: 37.1 (28 Jan 2023 05:39)  T(F): 97.9 (28 Jan 2023 20:00), Max: 98.7 (28 Jan 2023 05:39)  HR: 96 (28 Jan 2023 21:42) (61 - 96)  BP: 113/90 (28 Jan 2023 21:42) (93/56 - 158/80)  BP(mean): 94 (28 Jan 2023 21:42) (69 - 94)  RR: 20 (28 Jan 2023 21:42) (17 - 23)  SpO2: 97% (28 Jan 2023 21:42) (94% - 100%)    Parameters below as of 28 Jan 2023 21:42  Patient On (Oxygen Delivery Method): room air    PHYSICAL EXAM:  GENERAL: NAD, lying in bed comfortably  HEAD:  Atraumatic, Normocephalic  EYES: EOMI, conjunctiva and sclera clear  CHEST/LUNG: Unlabored respirations  HEART: Irregular, +S1, S2  EXTREMITIES:  2+ Peripheral Pulses, brisk capillary refill. No clubbing, cyanosis, or edema  NERVOUS SYSTEM:  Alert & Oriented X3, speech clear. Answers questions appropriately. No focal neurological deficits   MSK: FROM all 4 extremities, full and equal strength  SKIN: Warm, dry    A/P:    New onset AFib in setting of diverticular bleed  -EKG  -CBC, BMP, Mag, Phos, TSH, Trop  -5mg IV lopressor with no improvement in HR, BP dropped to 86/48  -500cc NS bolus ordered, BP remains hypotensive  -[id number] 5; however defer full AC at this time in setting of active GIB  -TTE ordered  -Cardiology consulted  -150mg amiodarone bolus

## 2023-01-28 NOTE — PROGRESS NOTE ADULT - NS ATTEND AMEND GEN_ALL_CORE FT
77F transferred from [facility] for rectal bleeding and need for CTA with IR embolization. Prior to arrival she presented with rectal bleeding with significant blood loss requiring 6 PRBC, 2FFP and 2 PLT along with cryo. EGD at [facility] revealed non bleeding ulcer.     CTA here showed sigmoid diverticulitis without bleeding. Clinically her bleeding has stopped with stable H/H. This morning she had 2-3 large episodes of rectal bleeding with clot. CTA showed right sided diverticular bleeding thus IR was consulted. Conventional angiogram showed no active extravasation thus embolization was not performed.       Suggest to continue clear liquid diet, bowel prep with golytely   Will follow her clinical status.   Continue Abx for diverticulitis.   GI will follow

## 2023-01-28 NOTE — PROGRESS NOTE ADULT - SUBJECTIVE AND OBJECTIVE BOX
Patient is a 77y old  Female who presents with a chief complaint of GIB (27 Jan 2023 13:56)      HPI:  Patient was seen and examined at bedside. Patient with a h/o UC, on Mesalamine. Pt had a dark brown stool and today patient and large amount of hematochezia while having BM this morning. She has been tolerating regular diet. She denies nausea, vomiting, abdominal pain or lightheadedness. CTA a/p was ordered by primary team.       REVIEW OF SYSTEMS:  Constitutional: No fever, weight loss or fatigue  ENMT:  No difficulty hearing, tinnitus, vertigo; No sinus or throat pain  Respiratory: No cough, wheezing, chills or hemoptysis  Cardiovascular: No chest pain, palpitations, dizziness or leg swelling  Gastrointestinal: See HPI  Skin: No itching, burning, rashes or lesions   Musculoskeletal: No joint pain or swelling; No muscle, back or extremity pain    PAST MEDICAL & SURGICAL HISTORY:      FAMILY HISTORY:      SOCIAL HISTORY:  Smoking Status: [ ] Current, [ ] Former, [ ] Never  Pack Years:  [  ] EtOH  [  ] IVDA    MEDICATIONS:  MEDICATIONS  (STANDING):  allopurinol 100 milliGRAM(s) Oral daily  atorvastatin 40 milliGRAM(s) Oral at bedtime  balsalazide 2250 milliGRAM(s) Oral <User Schedule>  chlorhexidine 2% Cloths 1 Application(s) Topical <User Schedule>  cholecalciferol 2000 Unit(s) Oral daily  clotrimazole/betamethasone Cream 1 Application(s) Topical two times a day  cyanocobalamin 1000 MICROGram(s) Oral daily  dextrose 5%. 1000 milliLiter(s) (100 mL/Hr) IV Continuous <Continuous>  dextrose 5%. 1000 milliLiter(s) (50 mL/Hr) IV Continuous <Continuous>  dextrose 50% Injectable 25 Gram(s) IV Push once  dextrose 50% Injectable 12.5 Gram(s) IV Push once  dextrose 50% Injectable 25 Gram(s) IV Push once  fluconAZOLE   Tablet 100 milliGRAM(s) Oral daily  glucagon  Injectable 1 milliGRAM(s) IntraMuscular once  insulin lispro (ADMELOG) corrective regimen sliding scale   SubCutaneous three times a day before meals  levothyroxine 125 MICROGram(s) Oral daily  lisinopril 20 milliGRAM(s) Oral daily  montelukast 10 milliGRAM(s) Oral daily  pantoprazole    Tablet 40 milliGRAM(s) Oral two times a day  piperacillin/tazobactam IVPB.. 3.375 Gram(s) IV Intermittent every 8 hours  potassium phosphate / sodium phosphate Powder (PHOS-NaK) 1 Packet(s) Oral three times a day before meals  sodium chloride 0.9% Bolus 500 milliLiter(s) IV Bolus once  torsemide 20 milliGRAM(s) Oral daily    MEDICATIONS  (PRN):  albuterol    90 MICROgram(s) HFA Inhaler 2 Puff(s) Inhalation every 6 hours PRN Shortness of Breath and/or Wheezing  dextrose Oral Gel 15 Gram(s) Oral once PRN Blood Glucose LESS THAN 70 milliGRAM(s)/deciliter  ondansetron Injectable 4 milliGRAM(s) IV Push every 4 hours PRN Nausea and/or Vomiting  oxycodone    5 mG/acetaminophen 325 mG 2 Tablet(s) Oral every 6 hours PRN mod to severe pain      Allergies    No Known Allergies    Intolerances        Vital Signs Last 24 Hrs  T(C): 36.8 (28 Jan 2023 09:51), Max: 37.1 (28 Jan 2023 05:39)  T(F): 98.2 (28 Jan 2023 09:51), Max: 98.7 (28 Jan 2023 05:39)  HR: 87 (28 Jan 2023 13:28) (61 - 87)  BP: 111/73 (28 Jan 2023 13:28) (104/68 - 158/80)  BP(mean): --  RR: 18 (28 Jan 2023 13:28) (17 - 18)  SpO2: 100% (28 Jan 2023 13:28) (95% - 100%)    Parameters below as of 28 Jan 2023 09:51  Patient On (Oxygen Delivery Method): room air        01-27 @ 07:01 - 01-28 @ 07:00  --------------------------------------------------------  IN: 240 mL / OUT: 0 mL / NET: 240 mL          PHYSICAL EXAM:    General:  in no acute distress, bloody stool on bedsheets as patient had a BM right before examination   HEENT: MMM, conjunctiva and sclera clear  Gastrointestinal: Soft, non-tender non-distended; Normal bowel sounds; No rebound or guarding  Extremities: Normal range of motion, No clubbing, cyanosis or edema  Neurological: Alert and oriented x3  Skin: Warm and dry. No obvious rash        LABS:                        10.4   9.08  )-----------( 184      ( 28 Jan 2023 12:20 )             31.0     28 Jan 2023 10:30    145    |  106    |  15.0   ----------------------------<  117    3.7     |  28.0   |  0.83     Ca    9.3        28 Jan 2023 10:30  Phos  2.0       27 Jan 2023 06:45  Mg     2.1       27 Jan 2023 06:45    TPro  6.3    /  Alb  4.0    /  TBili  0.6    /  DBili  x      /  AST  20     /  ALT  11     /  AlkPhos  68     / Amylase x      /Lipase x      28 Jan 2023 10:30              RADIOLOGY & ADDITIONAL STUDIES:       < from: CT Abdomen and Pelvis w/wo IV Cont (01.28.23 @ 12:52) >    ACC: [id number] EXAM:  CT ABDOMEN AND PELVIS WAW IC   ORDERED BY: MADDI MARX     PROCEDURE DATE:  01/28/2023          INTERPRETATION:  CLINICAL INFORMATION: Gastrointestinal hemorrhage,   diverticular bleed    COMPARISON: CTA abdomen/pelvis 1/25/2023    CONTRAST/COMPLICATIONS:  IV Contrast: Omnipaque 350  97 cc administered   0ml cc discarded  Oral Contrast: NONE  Complications: None reported at time of study completion    PROCEDURE:  CT of the Abdomen and Pelvis was performed.  Precontrast,Arterial and Delayed phases were performed.  Sagittal and coronal reformats were performed.    IMPRESSION:    A site of active gastrointestinal bleeding is identified in the right   colon in the region of a right-sided colonic diverticulum (62-66; 8 and   61-65; 11).    There has been an increase in the amount of liquid and semisolid stool in   the colon.    There has otherwise been no significant change from the recent prior CTA   study of 1/25/2023.    COMMUNICATION:  I communicated the findings of this report the telephone   to the [employer] nurse, Saul Hinkle, at [facility]  on   1/28/2023 at 1:35 PM.  Critical value policy of the hospital was   followed.  Read back and confirmation of receipt of this communication   was performed.  This verbal communication supplements the text report of   this document.    --- End of Report ---            PITER MUNIZ MD; Attending Nuclear Medicine  This document has been electronically signed. Jan 28 2023  1:39PM    < end of copied text >

## 2023-01-28 NOTE — CONSULT NOTE ADULT - PROBLEM SELECTOR RECOMMENDATION 9
-new onset, no prior hx of AFib  -pt asymptomatic  -telemetry monitoring  -pt received Lopressor 5 mg IV with a drop in BP and no effect on HR  -Amiodarone 150 mg IVPB  -fluid bolus  -check electrolytes, keep K>4 and Mg>2  -keep Hgb>8, transfuse as needed  -TTE in am  -CHADSVASC score 5, however, pt is not a candidate for anticoagulation due to active bleeding  -may need ROYAL/DCCV  -will need [facility] for outpatient monitoring

## 2023-01-28 NOTE — BRIEF OPERATIVE NOTE - OPERATION/FINDINGS
1) R SMA, R colic artery, middle colic artery studied. No active extravasation identified. No embolization performed.   2) R CFA closed 1) R SMA, R colic artery, middle colic artery studied and IR images correlated in real-time with prior CTA. No active extravasation identified. No embolization performed.   2) R CFA closed 1) SMA, R colic artery, middle colic artery studied and IR images correlated in real-time with prior CTA. No active extravasation identified. No embolization performed.   2) R CFA closed

## 2023-01-28 NOTE — CONSULT NOTE ADULT - NS ATTEND AMEND GEN_ALL_CORE FT
78 y/o F initially presented to [facility] with blood per rectum requiring multiple transfusions; s/p EGD with clean based gastric/duodenal ulcers, transferred to [facility] for further management. Initial CTA showed acute diverticulitis with no active bleeding; repeat study after additional episodes of rectal bleeding showed R sided diverticular bleeding. Cardiology consulted due to new onset AF.   -AF in the setting of anemia   -Transfuse as needed to maintain hgb >8  -Patient received 150 mg of amiodarone and converted to SR overnight and converted to SR   -CHADSVASC 5 (age, HTN, DM, female), however patient is unable to tolerate AC at this time due to ongoing anemia and GI bleeding   -If patient reverts to AF, would avoid further amiodarone unless able to tolerate AC due to potential risk of thromboembolism with cardioversion   -TTE pending   -Planned for colonoscopy in AM. If LVEF is normal with no significant valvular disease or WMA on TTE, patient will be considered acceptable risk for procedure and optimized from cardiac perspective
Patient seen and examined at bedside. No further bleeding or acute complaints hemoglobin stable CTA negative for active bleeding + diverticulitis. She has a history of UC on mesalamine. She will need a repeat colon in 6 -8 weeks to evaluate for malignancy. EGD to assess resolution of gastric ulcers can also be performed at that time. Continue PPI BID for 8 weeks. Advance diet as tolerated. Continue care per primary team

## 2023-01-28 NOTE — CONSULT NOTE ADULT - SUBJECTIVE AND OBJECTIVE BOX
[facility]                                              CARDIOLOGY AT [address], [place] [address]                                             Telephone: [phone]. Fax:[phone]                                                       CARDIOLOGY CONSULTATION NOTE                                                                                             History obtained by: Patient and medical record  Community Cardiologist: n/a   obtained: Yes [  ] No [ x ]  Reason for Consultation: AFib with RVR  Available out pt records reviewed: Yes [  ] No [ x ]    Chief complaint:    Patient is a 77y old  Female who presents with a chief complaint of GIB (28 Jan 2023 17:16)      HPI:  This is 76 y/o female with hx of asthma, HTN, HLD, ulcerative colitis who initially presented to [facility] with hematochezia. Pt was found hypotensive, with acute drop in Hgb and received multiple blood products. CT abdomen/pelvis demonstrated diverticulosis. Pt underwent EGD on 1/25 which revealed several clean based gastric and duodenal ulcers without active bleeding. Pt was left intubated and was transferred to [facility] for possible IR intervention. CT angio positive for diverticular bleed in right hemicolon. This evening pt was scheduled for IR procedure but no active extravasation was identified and no embolization was performed. After returning to the floor, pt was noted to have AFib with RVR to 160's on telemetry. Pt is asymptomatic, denies chest pain, SOB or palpitations. No prior hx of AFib. She used to follow with a cardiologist from [place] for routine checkups but hasn't seen anybody in 6 years.     CARDIAC TESTING   ECHO: n/a    STRESS: n/a    CATH: n/a    ELECTROPHYSIOLOGY: n/a    PAST MEDICAL HISTORY  UC  HTN  HLD  hypothyroid  asthma  DM2    PAST SURGICAL HISTORY      SOCIAL HISTORY: , lives alone  CIGARETTES:  smoked briefly in her 20's  ALCOHOL: a glass of wine 2-3 times a week  DRUGS: denies    FAMILY HISTORY:    Family History of Cardiovascular Disease:  Yes [  ] No [  ]  Coronary Artery Disease in first degree relative: Yes [  ] No [  ]  Sudden Cardiac Death in First degree relative: Yes [  ] No [  ]    HOME MEDICATIONS:  Albuterol (Eqv-ProAir HFA) 90 mcg/inh inhalation aerosol: 2 puff(s) inhaled every 6 hours, As Needed (26 Jan 2023 10:43)  allopurinol 100 mg oral tablet: 1 tab(s) orally once a day (26 Jan 2023 10:43)  Aspir 81 oral delayed release tablet: 1 tab(s) orally once a day (26 Jan 2023 10:43)  atorvastatin 40 mg oral tablet: 1 tab(s) orally once a day (26 Jan 2023 10:43)  balsalazide 750 mg oral capsule: 3 cap(s) orally once a day (26 Jan 2023 10:43)  empagliflozin 25 mg oral tablet: 1 tab(s) orally once a day (in the morning) (26 Jan 2023 10:43)  fluconazole 100 mg oral tablet: 1 tab(s) orally once a day  filled 1/10/23 x 30 day supply (26 Jan 2023 10:43)  levothyroxine 125 mcg (0.125 mg) oral tablet: 1 tab(s) orally once a day (26 Jan 2023 10:43)  lisinopril 40 mg oral tablet: 1 tab(s) orally once a day (26 Jan 2023 10:43)  montelukast 10 mg oral tablet: 1 tab(s) orally once a day (26 Jan 2023 10:43)  torsemide 20 mg oral tablet: 1 tab(s) orally once a day (26 Jan 2023 10:43)  tramadol-acetaminophen 37.5 mg-325 mg oral tablet: 1 tab(s) orally every 6 hours, As Needed  filled 1/10/23 x 15 day supply (26 Jan 2023 10:43)      CURRENT CARDIAC MEDICATIONS:      CURRENT OTHER MEDICATIONS:  albuterol    90 MICROgram(s) HFA Inhaler 2 Puff(s) Inhalation every 6 hours PRN Shortness of Breath and/or Wheezing  montelukast 10 milliGRAM(s) Oral daily  ondansetron Injectable 4 milliGRAM(s) IV Push every 4 hours PRN Nausea and/or Vomiting  oxycodone    5 mG/acetaminophen 325 mG 2 Tablet(s) Oral every 6 hours PRN mod to severe pain  balsalazide 2250 milliGRAM(s) Oral <User Schedule>  pantoprazole  Injectable 40 milliGRAM(s) IV Push two times a day  allopurinol 100 milliGRAM(s) Oral daily  atorvastatin 40 milliGRAM(s) Oral at bedtime  chlorhexidine 2% Cloths 1 Application(s) Topical <User Schedule>  cholecalciferol 2000 Unit(s) Oral daily  clotrimazole/betamethasone Cream 1 Application(s) Topical two times a day  cyanocobalamin 1000 MICROGram(s) Oral daily  dextrose 5%. 1000 milliLiter(s) (100 mL/Hr) IV Continuous <Continuous>  dextrose 5%. 1000 milliLiter(s) (50 mL/Hr) IV Continuous <Continuous>  dextrose 50% Injectable 25 Gram(s) IV Push once, Stop order after: 1 Doses  dextrose 50% Injectable 12.5 Gram(s) IV Push once, Stop order after: 1 Doses  dextrose 50% Injectable 25 Gram(s) IV Push once, Stop order after: 1 Doses  dextrose Oral Gel 15 Gram(s) Oral once, Stop order after: 1 Doses PRN Blood Glucose LESS THAN 70 milliGRAM(s)/deciliter  fluconAZOLE   Tablet 100 milliGRAM(s) Oral daily, Stop order after: 5 Days  glucagon  Injectable 1 milliGRAM(s) IntraMuscular once, Stop order after: 1 Doses  insulin lispro (ADMELOG) corrective regimen sliding scale   SubCutaneous three times a day before meals  levothyroxine 125 MICROGram(s) Oral daily  piperacillin/tazobactam IVPB.. 3.375 Gram(s) IV Intermittent every 8 hours, Stop order after: 7 Days  sodium chloride 0.9%. 1000 milliLiter(s) (100 mL/Hr) IV Continuous <Continuous>, Stop order after: 12 Hours      ALLERGIES:   No Known Allergies      REVIEW OF SYMPTOMS:   CONSTITUTIONAL: No fever, no chills, no weight loss, no weight gain, no fatigue   ENMT:  No vertigo; No sinus or throat pain  NECK: No pain or stiffness  CARDIOVASCULAR: as per HPI  RESPIRATORY: no Shortness of breath, no cough, no wheezing  : No dysuria, no hematuria   GI: c/o rectal bleeding, no nausea, no diarrhea, no constipation, no abdominal pain   NEURO: No headache, no slurred speech   MUSCULOSKELETAL: No joint pain or swelling; No muscle, back, or extremity pain  PSYCH: No agitation, no anxiety.    ALL OTHER REVIEW OF SYSTEMS ARE NEGATIVE.    VITAL SIGNS:  T(C): 36.6 (01-28-23 @ 20:00), Max: 37.1 (01-28-23 @ 05:39)  T(F): 97.9 (01-28-23 @ 20:00), Max: 98.7 (01-28-23 @ 05:39)  HR: 129 (01-28-23 @ 22:33) (61 - 143)  BP: 98/63 (01-28-23 @ 23:15) (86/48 - 158/80)  RR: 20 (01-28-23 @ 22:17) (17 - 23)  SpO2: 96% (01-28-23 @ 22:17) (94% - 100%)    INTAKE AND OUTPUT:     01-27 @ 07:01  -  01-28 @ 07:00  --------------------------------------------------------  IN: 240 mL / OUT: 0 mL / NET: 240 mL    01-28 @ 07:01  -  01-28 @ 23:33  --------------------------------------------------------  IN: 200 mL / OUT: 0 mL / NET: 200 mL        PHYSICAL EXAM:  Constitutional: Comfortable . No acute distress.   HEENT: Atraumatic and normocephalic , neck is supple . no JVD. No carotid bruit.  CNS: A&Ox3. No focal deficits.   Respiratory: CTAB, unlabored   Cardiovascular: tachycardic, irregular, normal s1 s2. No murmurs  Gastrointestinal: Soft, non-tender. +Bowel sounds.   Extremities: 2+ Peripheral Pulses, No clubbing, cyanosis, no edema  Psychiatric: Calm . no agitation.   Skin: Warm and dry, no ulcers on extremities     LABS:                            8.6    8.19  )-----------( 165      ( 28 Jan 2023 23:10 )             26.6     01-28    145  |  106  |  15.0  ----------------------------<  117<H>  3.7   |  28.0  |  0.83    Ca    9.3      28 Jan 2023 10:30  Phos  2.0     01-27  Mg     2.1     01-27    TPro  6.3<L>  /  Alb  4.0  /  TBili  0.6  /  DBili  x   /  AST  20  /  ALT  11  /  AlkPhos  68  01-28    PT/INR - ( 28 Jan 2023 15:00 )   PT: 12.4 sec;   INR: 1.07 ratio         PTT - ( 28 Jan 2023 15:00 )  PTT:26.8 sec        INTERPRETATION OF TELEMETRY: AFib with 's-160's    ECG: AFib with  bpm, T-wave abnormality in anterior leads  Prior ECG: Yes [ x ] No [  ]    RADIOLOGY & ADDITIONAL STUDIES:    X-ray:    CT scan:   < from: CT Abdomen and Pelvis w/wo IV Cont (01.28.23 @ 12:52) >  IMPRESSION:    A site of active gastrointestinal bleeding is identified in the right   colon in the region of a right-sided colonic diverticulum (62-66; 8 and   61-65; 11).    There has been an increase in the amount of liquid and semisolid stool in   the colon.    There has otherwise been no significant change from the recent prior CTA   study of 1/25/2023.    COMMUNICATION:  I communicated the findings of this report the telephone   to the [employer] nurse, Saul Hinkle, at [facility]  on   1/28/2023 at 1:35 PM.  Critical value policy of the hospital was   followed.  Read back and confirmation of receipt of this communication   was performed.  This verbal communication supplements the text report of   this document.    --- End of Report ---        PITER MUNIZ MD; Attending Nuclear Medicine  This document has been electronically signed. Jan 28 2023  1:39PM    < end of copied text >    MRI:   US:

## 2023-01-28 NOTE — PROGRESS NOTE ADULT - ASSESSMENT
76 y/o F with PMHx of HTN, HLD, hypothyroidism, DM type 2, and asthma who initially presented to [facility] on 1/24 complaining of large episode of hematochezia. Upon arrival to ER, had two additional episodes. Pt with hgb 13.9 on presentation. Overnight, had an acute drop in hgb (repeat 8.4). Pt received a total of 6u PRBCs, 2u platelets, 2u FFP, and cryo. Underwent EGD on 1/25 which was revealing of clean based gastric and duodenal ulcers, unlikely to explain pt's severe GI bleeding. Pt was transferred to [facility] for CTA abd/pelvis and possible IR intervention in the setting of suspected lower GI bleed. Pt remained intubated s/p EGD. CTA abd/pelvis revealed acute uncomplicated sigmoid diverticulitis, and no site of active GI hemorrhage was identified. extubated in ICU and monitored w/o further gi hemorrhaged. stable for transfer to medicine.    acute blood loss anemia: likely diverticular    PPI BID    advance diet    GI following  - CTA A/P w/ active gastrointestinal bleeding is identified in the right colon in the region of a right-sided colonic diverticulum   - Case discussed with IR for possible embolization. Pending IR recommendations  - ICU c/s placed  - will upgrade patient to stepdown  - started NS at 100 cc/hr      Peptic ulcer disease: c/w PPI BID    acute diverticulitis: c/w zosyn, transition to cipro/flagyl or augmentin on dc    UC: restart home balsalazide 750 mg oral capsule 3 cap(s) orally once a day   family brought it in     hypothyroid: synthroid.    Dm2: a1c 5.2.     TOV  ongoing    Diet- NPO  Updated patient's daughter, Xavier ([phone]) on 1/28.

## 2023-01-28 NOTE — PROGRESS NOTE ADULT - ASSESSMENT
77F with a history of UC on 5-asa here with acute GI bleeding. She was found to have gastric ulcers at an outside hospital. CTA today showed extravasation of right colonic diverticulum.     Continue to monitor HGB, IV fluid resuscitation discussed with primary team    Continue BID PPI  Positive CTA, increased risk of perforation with diverticulitis, will recommend IR consult to consider embolization  Continue antibiotics and diet as tolerated   Avoid nsaids

## 2023-01-28 NOTE — CONSULT NOTE ADULT - SUBJECTIVE AND OBJECTIVE BOX
Patient is a 77y old  Female who presents with a chief complaint of GIB (28 Jan 2023 13:38)      BRIEF HOSPITAL COURSE: 78 y/o F with PMHx of HTN, HLD, hypothyroidism, DM type 2, asthma, Ulcerative Colitis, who initially presented to [facility] [place]24 complaining of large episode of hematochezia. Upon arrival to ER, patient had two additional episodes with associated hypotension SBP 80's. Pt hgb 13.9 on presentation, and repeat 8.4. Pt received a total of 6u PRBCs, 2u platelets, 2u FFP, and cryo. CT abd/pelv with IV contrast demonstrated diverticulosis. Underwent EGD on 1/25 for which she was intubated, which revealed multiple clean based gastric and duodenal ulcers without stigmata of recent bleeding, which was felt to be unlikely to explain pt's GI bleeding. Pt was left intubated and transferred to [facility] for CTA abd/pelvis and possible IR intervention in the setting of suspected lower GI bleed. On initial arrival to [facility] MICU, patient had 1 episode of hematochezia, H/H has been stable x 3 without further need for blood transfusion. CTA abd/pelvis revealed acute uncomplicated sigmoid diverticulitis; no active GI hemorrhage was identified. Patient was evaluated by GI, diet advanced to clear liquids and is planned for outpatient f/u with her GI Dr. Whitaker for colonoscopy. Patient's family was advised to bring in her home Balsalazine for treatment of her UC. Diflucan was Rx as outpatient by her PCP for LE ringworm, which was resumed. As H/H has stabilized and she has had no further GI bleeding and not required further PRBC transfusion, patient was stable for transfer out of MICU to medical floors on 1/26.      Events last 24 hours: Patient developed recurrent lower GI bleeding today with episodes of red blood with clots. She developed relative hypotension with , which responded to 500cc IVF bolus. Repeat Hgb with slight drop 11.1 --> 10.4.           PAST MEDICAL & SURGICAL HISTORY:        Medications:  fluconAZOLE   Tablet 100 milliGRAM(s) Oral daily  piperacillin/tazobactam IVPB.. 3.375 Gram(s) IV Intermittent every 8 hours    lisinopril 20 milliGRAM(s) Oral daily  torsemide 20 milliGRAM(s) Oral daily    albuterol    90 MICROgram(s) HFA Inhaler 2 Puff(s) Inhalation every 6 hours PRN  montelukast 10 milliGRAM(s) Oral daily    ondansetron Injectable 4 milliGRAM(s) IV Push every 4 hours PRN  oxycodone    5 mG/acetaminophen 325 mG 2 Tablet(s) Oral every 6 hours PRN    balsalazide 2250 milliGRAM(s) Oral <User Schedule>  pantoprazole  Injectable 40 milliGRAM(s) IV Push two times a day    allopurinol 100 milliGRAM(s) Oral daily  atorvastatin 40 milliGRAM(s) Oral at bedtime  dextrose 50% Injectable 25 Gram(s) IV Push once  dextrose 50% Injectable 12.5 Gram(s) IV Push once  dextrose 50% Injectable 25 Gram(s) IV Push once  dextrose Oral Gel 15 Gram(s) Oral once PRN  glucagon  Injectable 1 milliGRAM(s) IntraMuscular once  insulin lispro (ADMELOG) corrective regimen sliding scale   SubCutaneous three times a day before meals  levothyroxine 125 MICROGram(s) Oral daily    cholecalciferol 2000 Unit(s) Oral daily  cyanocobalamin 1000 MICROGram(s) Oral daily  dextrose 5%. 1000 milliLiter(s) IV Continuous <Continuous>  dextrose 5%. 1000 milliLiter(s) IV Continuous <Continuous>  sodium chloride 0.9%. 1000 milliLiter(s) IV Continuous <Continuous>    chlorhexidine 2% Cloths 1 Application(s) Topical <User Schedule>  clotrimazole/betamethasone Cream 1 Application(s) Topical two times a day            ICU Vital Signs Last 24 Hrs  T(C): 36.8 (28 Jan 2023 09:51), Max: 37.1 (28 Jan 2023 05:39)  T(F): 98.2 (28 Jan 2023 09:51), Max: 98.7 (28 Jan 2023 05:39)  HR: 85 (28 Jan 2023 14:53) (61 - 87)  BP: 101/61 (28 Jan 2023 14:53) (101/61 - 158/80)  BP(mean): --  ABP: --  ABP(mean): --  RR: 18 (28 Jan 2023 13:28) (17 - 18)  SpO2: 100% (28 Jan 2023 13:28) (95% - 100%)    O2 Parameters below as of 28 Jan 2023 09:51  Patient On (Oxygen Delivery Method): room air                I&O's Detail    27 Jan 2023 07:01  -  28 Jan 2023 07:00  --------------------------------------------------------  IN:    Oral Fluid: 240 mL  Total IN: 240 mL    OUT:  Total OUT: 0 mL    Total NET: 240 mL            LABS:                        10.4   9.08  )-----------( 184      ( 28 Jan 2023 12:20 )             31.0     01-28    145  |  106  |  15.0  ----------------------------<  117<H>  3.7   |  28.0  |  0.83    Ca    9.3      28 Jan 2023 10:30  Phos  2.0     01-27  Mg     2.1     01-27    TPro  6.3<L>  /  Alb  4.0  /  TBili  0.6  /  DBili  x   /  AST  20  /  ALT  11  /  AlkPhos  68  01-28          CAPILLARY BLOOD GLUCOSE      POCT Blood Glucose.: 107 mg/dL (28 Jan 2023 12:27)    PT/INR - ( 28 Jan 2023 15:00 )   PT: 12.4 sec;   INR: 1.07 ratio         PTT - ( 28 Jan 2023 15:00 )  PTT:26.8 sec    CULTURES: Patient is a 77y old  Female who presents with a chief complaint of GIB (28 Jan 2023 13:38)      BRIEF HOSPITAL COURSE: 76 y/o F with PMHx of HTN, HLD, hypothyroidism, DM type 2, asthma, Ulcerative Colitis, who initially presented to [facility] on 1/24 complaining of large episode of hematochezia. Upon arrival to ER, patient had two additional episodes with associated hypotension SBP 80's. Pt hgb 13.9 on presentation, and repeat 8.4. Pt received a total of 6u PRBCs, 2u platelets, 2u FFP, and cryo. CT abd/pelv with IV contrast demonstrated diverticulosis. Underwent EGD on 1/25 for which she was intubated, which revealed multiple clean based gastric and duodenal ulcers without stigmata of recent bleeding, which was felt to be unlikely to explain pt's GI bleeding. Pt was left intubated and transferred to [facility] for CTA abd/pelvis and possible IR intervention in the setting of suspected lower GI bleed. On initial arrival to [facility] MICU, patient had 1 episode of hematochezia, H/H has been stable x 3 without further need for blood transfusion. CTA abd/pelvis revealed acute uncomplicated sigmoid diverticulitis; no active GI hemorrhage was identified. Patient was evaluated by GI, diet advanced to clear liquids and is planned for outpatient f/u with her GI Dr. Whitaker for colonoscopy. Patient's family was advised to bring in her home Balsalazine for treatment of her UC. Diflucan was Rx as outpatient by her PCP for LE ringworm, which was resumed. As H/H has stabilized and she has had no further GI bleeding and not required further PRBC transfusion, patient was stable for transfer out of MICU to medical floors on 1/26.      Events last 24 hours: Patient developed recurrent lower GI bleeding today with episodes of red blood with clots. She developed relative hypotension with , which responded to 500cc IVF bolus. Repeat Hgb with slight drop 11.1 --> 10.4.           PAST MEDICAL & SURGICAL HISTORY:        Medications:  fluconAZOLE   Tablet 100 milliGRAM(s) Oral daily  piperacillin/tazobactam IVPB.. 3.375 Gram(s) IV Intermittent every 8 hours    lisinopril 20 milliGRAM(s) Oral daily  torsemide 20 milliGRAM(s) Oral daily    albuterol    90 MICROgram(s) HFA Inhaler 2 Puff(s) Inhalation every 6 hours PRN  montelukast 10 milliGRAM(s) Oral daily    ondansetron Injectable 4 milliGRAM(s) IV Push every 4 hours PRN  oxycodone    5 mG/acetaminophen 325 mG 2 Tablet(s) Oral every 6 hours PRN    balsalazide 2250 milliGRAM(s) Oral <User Schedule>  pantoprazole  Injectable 40 milliGRAM(s) IV Push two times a day    allopurinol 100 milliGRAM(s) Oral daily  atorvastatin 40 milliGRAM(s) Oral at bedtime  dextrose 50% Injectable 25 Gram(s) IV Push once  dextrose 50% Injectable 12.5 Gram(s) IV Push once  dextrose 50% Injectable 25 Gram(s) IV Push once  dextrose Oral Gel 15 Gram(s) Oral once PRN  glucagon  Injectable 1 milliGRAM(s) IntraMuscular once  insulin lispro (ADMELOG) corrective regimen sliding scale   SubCutaneous three times a day before meals  levothyroxine 125 MICROGram(s) Oral daily    cholecalciferol 2000 Unit(s) Oral daily  cyanocobalamin 1000 MICROGram(s) Oral daily  dextrose 5%. 1000 milliLiter(s) IV Continuous <Continuous>  dextrose 5%. 1000 milliLiter(s) IV Continuous <Continuous>  sodium chloride 0.9%. 1000 milliLiter(s) IV Continuous <Continuous>    chlorhexidine 2% Cloths 1 Application(s) Topical <User Schedule>  clotrimazole/betamethasone Cream 1 Application(s) Topical two times a day            ICU Vital Signs Last 24 Hrs  T(C): 36.8 (28 Jan 2023 09:51), Max: 37.1 (28 Jan 2023 05:39)  T(F): 98.2 (28 Jan 2023 09:51), Max: 98.7 (28 Jan 2023 05:39)  HR: 85 (28 Jan 2023 14:53) (61 - 87)  BP: 101/61 (28 Jan 2023 14:53) (101/61 - 158/80)  BP(mean): --  ABP: --  ABP(mean): --  RR: 18 (28 Jan 2023 13:28) (17 - 18)  SpO2: 100% (28 Jan 2023 13:28) (95% - 100%)    O2 Parameters below as of 28 Jan 2023 09:51  Patient On (Oxygen Delivery Method): room air                I&O's Detail    27 Jan 2023 07:01  -  28 Jan 2023 07:00  --------------------------------------------------------  IN:    Oral Fluid: 240 mL  Total IN: 240 mL    OUT:  Total OUT: 0 mL    Total NET: 240 mL            LABS:                        10.4   9.08  )-----------( 184      ( 28 Jan 2023 12:20 )             31.0     01-28    145  |  106  |  15.0  ----------------------------<  117<H>  3.7   |  28.0  |  0.83    Ca    9.3      28 Jan 2023 10:30  Phos  2.0     01-27  Mg     2.1     01-27    TPro  6.3<L>  /  Alb  4.0  /  TBili  0.6  /  DBili  x   /  AST  20  /  ALT  11  /  AlkPhos  68  01-28          CAPILLARY BLOOD GLUCOSE      POCT Blood Glucose.: 107 mg/dL (28 Jan 2023 12:27)    PT/INR - ( 28 Jan 2023 15:00 )   PT: 12.4 sec;   INR: 1.07 ratio         PTT - ( 28 Jan 2023 15:00 )  PTT:26.8 sec    CULTURES:        PHYSICAL EXAM:  General: Well appearing, non-toxic, in no acute distress  Pulm: Clear bilaterally  CV: S1 S2 normal sinus rhythm  GI: Abdomen soft, nondistended, nontender to palpation  Ext: No LE edema  Skin: Warm and well perfused  Neuro: Alert and oriented, interactive, no gross focal deficits      TIME SPENT: >60 minutes assessing presenting problems of acute illness, including medical decision making, initiating plan of care, reviewing data, reviewing radiologic exams, discussing with multidisciplinary team, discussing goals of care.

## 2023-01-29 ENCOUNTER — TRANSCRIPTION ENCOUNTER (OUTPATIENT)
Age: 78
End: 2023-01-29

## 2023-01-29 LAB
ALBUMIN SERPL ELPH-MCNC: 2.4 G/DL — LOW (ref 3.3–5.2)
ALP SERPL-CCNC: 40 U/L — SIGNIFICANT CHANGE UP (ref 40–120)
ALT FLD-CCNC: 8 U/L — SIGNIFICANT CHANGE UP
ANION GAP SERPL CALC-SCNC: 9 MMOL/L — SIGNIFICANT CHANGE UP (ref 5–17)
AST SERPL-CCNC: 12 U/L — SIGNIFICANT CHANGE UP
BILIRUB SERPL-MCNC: 0.4 MG/DL — SIGNIFICANT CHANGE UP (ref 0.4–2)
BUN SERPL-MCNC: 14.9 MG/DL — SIGNIFICANT CHANGE UP (ref 8–20)
CALCIUM SERPL-MCNC: 7.6 MG/DL — LOW (ref 8.4–10.5)
CHLORIDE SERPL-SCNC: 116 MMOL/L — HIGH (ref 96–108)
CO2 SERPL-SCNC: 20 MMOL/L — LOW (ref 22–29)
CREAT SERPL-MCNC: 0.69 MG/DL — SIGNIFICANT CHANGE UP (ref 0.5–1.3)
EGFR: 89 ML/MIN/1.73M2 — SIGNIFICANT CHANGE UP
GLUCOSE BLDC GLUCOMTR-MCNC: 112 MG/DL — HIGH (ref 70–99)
GLUCOSE BLDC GLUCOMTR-MCNC: 115 MG/DL — HIGH (ref 70–99)
GLUCOSE BLDC GLUCOMTR-MCNC: 120 MG/DL — HIGH (ref 70–99)
GLUCOSE BLDC GLUCOMTR-MCNC: 124 MG/DL — HIGH (ref 70–99)
GLUCOSE SERPL-MCNC: 110 MG/DL — HIGH (ref 70–99)
HCT VFR BLD CALC: 23.2 % — LOW (ref 34.5–45)
HCT VFR BLD CALC: 24.2 % — LOW (ref 34.5–45)
HGB BLD-MCNC: 7.4 G/DL — LOW (ref 11.5–15.5)
HGB BLD-MCNC: 8 G/DL — LOW (ref 11.5–15.5)
MCHC RBC-ENTMCNC: 29.5 PG — SIGNIFICANT CHANGE UP (ref 27–34)
MCHC RBC-ENTMCNC: 30.2 PG — SIGNIFICANT CHANGE UP (ref 27–34)
MCHC RBC-ENTMCNC: 31.9 GM/DL — LOW (ref 32–36)
MCHC RBC-ENTMCNC: 33.1 GM/DL — SIGNIFICANT CHANGE UP (ref 32–36)
MCV RBC AUTO: 91.3 FL — SIGNIFICANT CHANGE UP (ref 80–100)
MCV RBC AUTO: 92.4 FL — SIGNIFICANT CHANGE UP (ref 80–100)
PLATELET # BLD AUTO: 153 K/UL — SIGNIFICANT CHANGE UP (ref 150–400)
PLATELET # BLD AUTO: 164 K/UL — SIGNIFICANT CHANGE UP (ref 150–400)
POTASSIUM SERPL-MCNC: 4.2 MMOL/L — SIGNIFICANT CHANGE UP (ref 3.5–5.3)
POTASSIUM SERPL-SCNC: 4.2 MMOL/L — SIGNIFICANT CHANGE UP (ref 3.5–5.3)
PROT SERPL-MCNC: 4.1 G/DL — LOW (ref 6.6–8.7)
RBC # BLD: 2.51 M/UL — LOW (ref 3.8–5.2)
RBC # BLD: 2.65 M/UL — LOW (ref 3.8–5.2)
RBC # FLD: 14.9 % — HIGH (ref 10.3–14.5)
RBC # FLD: 15.1 % — HIGH (ref 10.3–14.5)
SARS-COV-2 RNA SPEC QL NAA+PROBE: SIGNIFICANT CHANGE UP
SODIUM SERPL-SCNC: 145 MMOL/L — SIGNIFICANT CHANGE UP (ref 135–145)
WBC # BLD: 6.27 K/UL — SIGNIFICANT CHANGE UP (ref 3.8–10.5)
WBC # BLD: 9.71 K/UL — SIGNIFICANT CHANGE UP (ref 3.8–10.5)
WBC # FLD AUTO: 6.27 K/UL — SIGNIFICANT CHANGE UP (ref 3.8–10.5)
WBC # FLD AUTO: 9.71 K/UL — SIGNIFICANT CHANGE UP (ref 3.8–10.5)

## 2023-01-29 PROCEDURE — 99222 1ST HOSP IP/OBS MODERATE 55: CPT

## 2023-01-29 PROCEDURE — 99233 SBSQ HOSP IP/OBS HIGH 50: CPT

## 2023-01-29 PROCEDURE — 99232 SBSQ HOSP IP/OBS MODERATE 35: CPT

## 2023-01-29 RX ORDER — SODIUM CHLORIDE 9 MG/ML
500 INJECTION INTRAMUSCULAR; INTRAVENOUS; SUBCUTANEOUS ONCE
Refills: 0 | Status: COMPLETED | OUTPATIENT
Start: 2023-01-29 | End: 2023-01-29

## 2023-01-29 RX ORDER — POTASSIUM CHLORIDE 20 MEQ
40 PACKET (EA) ORAL ONCE
Refills: 0 | Status: COMPLETED | OUTPATIENT
Start: 2023-01-28 | End: 2023-01-28

## 2023-01-29 RX ORDER — MAGNESIUM SULFATE 500 MG/ML
2 VIAL (ML) INJECTION ONCE
Refills: 0 | Status: COMPLETED | OUTPATIENT
Start: 2023-01-28 | End: 2023-01-28

## 2023-01-29 RX ORDER — AMIODARONE HYDROCHLORIDE 400 MG/1
150 TABLET ORAL ONCE
Refills: 0 | Status: COMPLETED | OUTPATIENT
Start: 2023-01-29 | End: 2023-01-29

## 2023-01-29 RX ORDER — SOD SULF/SODIUM/NAHCO3/KCL/PEG
4000 SOLUTION, RECONSTITUTED, ORAL ORAL ONCE
Refills: 0 | Status: COMPLETED | OUTPATIENT
Start: 2023-01-29 | End: 2023-01-29

## 2023-01-29 RX ADMIN — PIPERACILLIN AND TAZOBACTAM 25 GRAM(S): 4; .5 INJECTION, POWDER, LYOPHILIZED, FOR SOLUTION INTRAVENOUS at 05:01

## 2023-01-29 RX ADMIN — BALSALAZIDE DISODIUM 2250 MILLIGRAM(S): 750 CAPSULE ORAL at 19:48

## 2023-01-29 RX ADMIN — PANTOPRAZOLE SODIUM 40 MILLIGRAM(S): 20 TABLET, DELAYED RELEASE ORAL at 17:16

## 2023-01-29 RX ADMIN — AMIODARONE HYDROCHLORIDE 412 MILLIGRAM(S): 400 TABLET ORAL at 00:24

## 2023-01-29 RX ADMIN — Medication 25 GRAM(S): at 00:09

## 2023-01-29 RX ADMIN — SODIUM CHLORIDE 1000 MILLILITER(S): 9 INJECTION INTRAMUSCULAR; INTRAVENOUS; SUBCUTANEOUS at 04:25

## 2023-01-29 RX ADMIN — Medication 4000 MILLILITER(S): at 15:25

## 2023-01-29 RX ADMIN — CLOTRIMAZOLE AND BETAMETHASONE DIPROPIONATE 1 APPLICATION(S): 10; .5 CREAM TOPICAL at 17:16

## 2023-01-29 RX ADMIN — PIPERACILLIN AND TAZOBACTAM 25 GRAM(S): 4; .5 INJECTION, POWDER, LYOPHILIZED, FOR SOLUTION INTRAVENOUS at 13:04

## 2023-01-29 RX ADMIN — PIPERACILLIN AND TAZOBACTAM 25 GRAM(S): 4; .5 INJECTION, POWDER, LYOPHILIZED, FOR SOLUTION INTRAVENOUS at 22:00

## 2023-01-29 RX ADMIN — ATORVASTATIN CALCIUM 40 MILLIGRAM(S): 80 TABLET, FILM COATED ORAL at 21:40

## 2023-01-29 RX ADMIN — Medication 5 MILLIGRAM(S): at 21:41

## 2023-01-29 RX ADMIN — PREGABALIN 1000 MICROGRAM(S): 225 CAPSULE ORAL at 12:03

## 2023-01-29 RX ADMIN — Medication 125 MICROGRAM(S): at 05:00

## 2023-01-29 RX ADMIN — FLUCONAZOLE 100 MILLIGRAM(S): 150 TABLET ORAL at 12:03

## 2023-01-29 RX ADMIN — SODIUM CHLORIDE 1000 MILLILITER(S): 9 INJECTION INTRAMUSCULAR; INTRAVENOUS; SUBCUTANEOUS at 02:52

## 2023-01-29 RX ADMIN — CLOTRIMAZOLE AND BETAMETHASONE DIPROPIONATE 1 APPLICATION(S): 10; .5 CREAM TOPICAL at 05:00

## 2023-01-29 RX ADMIN — PANTOPRAZOLE SODIUM 40 MILLIGRAM(S): 20 TABLET, DELAYED RELEASE ORAL at 05:00

## 2023-01-29 RX ADMIN — Medication 40 MILLIEQUIVALENT(S): at 00:09

## 2023-01-29 RX ADMIN — Medication 2000 UNIT(S): at 11:49

## 2023-01-29 RX ADMIN — Medication 100 MILLIGRAM(S): at 11:49

## 2023-01-29 RX ADMIN — CHLORHEXIDINE GLUCONATE 1 APPLICATION(S): 213 SOLUTION TOPICAL at 05:00

## 2023-01-29 RX ADMIN — SODIUM CHLORIDE 100 MILLILITER(S): 9 INJECTION INTRAMUSCULAR; INTRAVENOUS; SUBCUTANEOUS at 09:24

## 2023-01-29 RX ADMIN — SODIUM CHLORIDE 1000 MILLILITER(S): 9 INJECTION INTRAMUSCULAR; INTRAVENOUS; SUBCUTANEOUS at 02:08

## 2023-01-29 NOTE — PROGRESS NOTE ADULT - NS ATTEND AMEND GEN_ALL_CORE FT
77F transferred from [facility] for rectal bleeding and need for CTA with IR embolization. Prior to arrival she presented with rectal bleeding with significant blood loss requiring 6 PRBC, 2FFP and 2 PLT along with cryo. EGD at [facility] revealed non bleeding gastric ulcer.     CTA here showed sigmoid diverticulitis without bleeding. Clinically her bleeding has stopped with stable H/H. Saturday she had 2-3 large episodes of rectal bleeding with clot. CTA showed right sided diverticular bleeding thus IR was consulted. Conventional angiogram showed no active extravasation thus embolization was not performed. she continued to have bloody BM and with drop in Hb, though less bleeding today.   BP is on softer side. Has episode of New onset afib last night.     Lat colon was around 3-4 years ago with Dr. Whitaker. Her UC was mild to moderate per patient.     Given ongoing bleeding and hx of UC causing bleeding, afib causing ischemic colitis related bleeding or it could be diverticular bleeding- though less common to see right sided diverticular bleeding but certainly is possible.     I have extended d/w family and patient.     Will prep her for colonoscopy and will attempt colon, we can always convert to sigmoidoscopy if there is severe inflammation. They understand the risk of perforation.   Suggest to continue clear liquid diet, bowel prep with golytely   Will follow her clinical status. If overnight she become hemodynamically stable with profuse bleeding- suggest call IR for repeat angiogram and possible embolization.   Continue to hold antiplatets   Continue Abx for diverticulitis.   GI will follow.

## 2023-01-29 NOTE — PROGRESS NOTE ADULT - PROBLEM SELECTOR PLAN 1
Continue to monitor HGB, IV fluid resuscitation   Continue BID PPI  NPO after midnight  Colonoscopy Monday source anemia>reports last colonosc 4 years ago>history UC  Golytely  Positive CTA, increased risk of perforation with diverticulitis  Continue antibiotics    Avoid nsaids

## 2023-01-29 NOTE — PROCEDURE NOTE - NSPROCDETAILS_GEN_ALL_CORE
x 2/blood seen on insertion/dressing applied/flushes easily/secured in place
location identified, draped/prepped, sterile technique used/blood seen on insertion/dressing applied/flushes easily/secured in place

## 2023-01-29 NOTE — PROGRESS NOTE ADULT - SUBJECTIVE AND OBJECTIVE BOX
Chief Complaint:  Patient is a 77y old  Female who presents with a chief complaint of GIB (28 Jan 2023 23:32)     patient being seen in follow-up consultation for GI bleed.   Interval Events / Subjective: Patient seen and examined at bedside    Reports  blood tinged stool last 12 hours>tolerating liquid diet  Abd exam unremarkable        Review of Systems:  . Constitutional: No fever, chills,   · ENMT: no vertigo, no throat pain  . Neck: No pain or stiffness  · Respiratory and Thorax: No shortness of breath, no cough, no wheezing  · Cardiovascular: No chest pain, palpitation, no dizziness, no orthopnea,   · Gastrointestinal: see above.  · Genitourinary: No hematuria, no dysuria  · Musculoskeletal: Negative  · Neurological: no headache, no vision changes  · Psychiatric: no agitation, no anxiety  · Hematology/Lymphatics: negative  · Endocrine: negative    MEDICATIONS:   MEDICATIONS  (STANDING):  allopurinol 100 milliGRAM(s) Oral daily  atorvastatin 40 milliGRAM(s) Oral at bedtime  balsalazide 2250 milliGRAM(s) Oral <User Schedule>  bisacodyl 5 milliGRAM(s) Oral at bedtime  chlorhexidine 2% Cloths 1 Application(s) Topical <User Schedule>  cholecalciferol 2000 Unit(s) Oral daily  clotrimazole/betamethasone Cream 1 Application(s) Topical two times a day  cyanocobalamin 1000 MICROGram(s) Oral daily  dextrose 5%. 1000 milliLiter(s) (100 mL/Hr) IV Continuous <Continuous>  dextrose 5%. 1000 milliLiter(s) (50 mL/Hr) IV Continuous <Continuous>  dextrose 50% Injectable 25 Gram(s) IV Push once  dextrose 50% Injectable 12.5 Gram(s) IV Push once  dextrose 50% Injectable 25 Gram(s) IV Push once  fluconAZOLE   Tablet 100 milliGRAM(s) Oral daily  glucagon  Injectable 1 milliGRAM(s) IntraMuscular once  insulin lispro (ADMELOG) corrective regimen sliding scale   SubCutaneous three times a day before meals  levothyroxine 125 MICROGram(s) Oral daily  montelukast 10 milliGRAM(s) Oral daily  pantoprazole  Injectable 40 milliGRAM(s) IV Push two times a day  piperacillin/tazobactam IVPB.. 3.375 Gram(s) IV Intermittent every 8 hours  polyethylene glycol/electrolyte Solution. 4000 milliLiter(s) Oral once    MEDICATIONS  (PRN):  albuterol    90 MICROgram(s) HFA Inhaler 2 Puff(s) Inhalation every 6 hours PRN Shortness of Breath and/or Wheezing  dextrose Oral Gel 15 Gram(s) Oral once PRN Blood Glucose LESS THAN 70 milliGRAM(s)/deciliter  ondansetron Injectable 4 milliGRAM(s) IV Push every 4 hours PRN Nausea and/or Vomiting  oxycodone    5 mG/acetaminophen 325 mG 2 Tablet(s) Oral every 6 hours PRN mod to severe pain      ALLERGIES:   Allergies    No Known Allergies    Intolerances        VITAL SIGNS:   Vital Signs Last 24 Hrs  T(C): 36.6 (29 Jan 2023 08:00), Max: 36.6 (28 Jan 2023 17:42)  T(F): 97.8 (29 Jan 2023 08:00), Max: 97.9 (28 Jan 2023 17:42)  HR: 64 (29 Jan 2023 10:10) (62 - 143)  BP: 115/63 (29 Jan 2023 10:10) (80/62 - 119/96)  BP(mean): 79 (29 Jan 2023 10:10) (60 - 102)  RR: 14 (29 Jan 2023 10:10) (14 - 23)  SpO2: 94% (29 Jan 2023 10:10) (94% - 100%)    Parameters below as of 29 Jan 2023 10:10  Patient On (Oxygen Delivery Method): room air      I&O's Summary    28 Jan 2023 07:01  -  29 Jan 2023 07:00  --------------------------------------------------------  IN: 2050 mL / OUT: 0 mL / NET: 2050 mL        PHYSICAL EXAM:   Appearance: Normal	  HEENT:   Normal oral mucosa, PERRL, EOMI	  Lymphatic: No lymphadenopathy  Cardiovascular: Normal S1 S2,  Respiratory: rhonchi  Psychiatry: A & O x 3, Mood & affect appropriate  Gastrointestinal:  Soft, Non-tender, + BS	  Skin: No rashes, No ecchymoses, No cyanosis	  Neurologic: Non-focal  Extremities: Normal range of motion, No clubbing, cyanosis or edema          LABS:  CBC Full  -  ( 29 Jan 2023 06:05 )  WBC Count : 6.27 K/uL  RBC Count : 2.51 M/uL  Hemoglobin : 7.4 g/dL  Hematocrit : 23.2 %  Platelet Count - Automated : 164 K/uL  Mean Cell Volume : 92.4 fl  Mean Cell Hemoglobin : 29.5 pg  Mean Cell Hemoglobin Concentration : 31.9 gm/dL  Auto Neutrophil # : x  Auto Lymphocyte # : x  Auto Monocyte # : x  Auto Eosinophil # : x  Auto Basophil # : x  Auto Neutrophil % : x  Auto Lymphocyte % : x  Auto Monocyte % : x  Auto Eosinophil % : x  Auto Basophil % : x    01-29    145  |  116<H>  |  14.9  ----------------------------<  110<H>  4.2   |  20.0<L>  |  0.69    Ca    7.6<L>      29 Jan 2023 06:05  Phos  3.8     01-28  Mg     1.7     01-28    TPro  4.1<L>  /  Alb  2.4<L>  /  TBili  0.4  /  DBili  x   /  AST  12  /  ALT  8   /  AlkPhos  40  01-29    LIVER FUNCTIONS - ( 29 Jan 2023 06:05 )  Alb: 2.4 g/dL / Pro: 4.1 g/dL / ALK PHOS: 40 U/L / ALT: 8 U/L / AST: 12 U/L / GGT: x           PT/INR - ( 28 Jan 2023 15:00 )   PT: 12.4 sec;   INR: 1.07 ratio         PTT - ( 28 Jan 2023 15:00 )  PTT:26.8 sec        RADIOLOGY & ADDITIONAL STUDIES  CTA < from: CT Abdomen and Pelvis w/wo IV Cont (01.28.23 @ 12:52) >  IMPRESSION:    A site of active gastrointestinal bleeding is identified in the right   colon in the region of a right-sided colonic diverticulum (62-66; 8 and   61-65; 11).    There has been an increase in the amount of liquid and semisolid stool in   the colon.    There has otherwise been no significant change from the recent prior CTA   study of 1/25/2023.    < end of copied text >

## 2023-01-29 NOTE — PROGRESS NOTE ADULT - ASSESSMENT
76 y/o F with PMHx of HTN, HLD, hypothyroidism, DM type 2, and asthma who initially presented to [facility] [place] [facility] on 1/24 complaining of large episode of hematochezia. Upon arrival to ER, had two additional episodes. Pt with hgb 13.9 on presentation. Overnight, had an acute drop in hgb (repeat 8.4). Pt received a total of 6u PRBCs, 2u platelets, 2u FFP, and cryo. Underwent EGD on 1/25 which was revealing of clean based gastric and duodenal ulcers, unlikely to explain pt's severe GI bleeding. Pt was transferred to [facility] for CTA abd/pelvis and possible IR intervention in the setting of suspected lower GI bleed. Pt remained intubated s/p EGD. CTA abd/pelvis revealed acute uncomplicated sigmoid diverticulitis, and no site of active GI hemorrhage was identified. extubated in ICU and monitored w/o further gi hemorrhaged. stable for transfer to medicine.    acute blood loss anemia: likely diverticular    PPI BID  - CTA A/P w/ active gastrointestinal bleeding is identified in the right colon in the region of a right-sided colonic diverticulum   - S/p IR angiogram w/ no active extravasation.  - Appreciate GI recs- tentative plan for colonoscopy on 1/30  - Will c/s surgery for diverticulitis  - will trend cbc Q6H    Afib w/ RVR- new  - S/p IV lopressor, x 1. S/p amiodarone 150 mg  - CHADVASc- 5, but elevated risk of bleeding  - F/u TTE  - c/w tele monitor      HTN  - will hold lasix and lisinopril    Peptic ulcer disease:  - c/w PPI BID    acute diverticulitis:  - c/w zosyn,  - transition to cipro/flagyl or augmentin on dc    UC: restart home balsalazide 750 mg oral capsule 3 cap(s) orally once a day   family brought it in     hypothyroid: synthroid.    Dm2: a1c 5.2.     Diet- CLD. NPO after MN for possible colonoscopy  Unable to reach patient's daughter, Xavier ([phone]) on 1/29.

## 2023-01-29 NOTE — PROCEDURE NOTE - ADDITIONAL PROCEDURE DETAILS
Two 18g IVs placed in the right and left upper arms on the 1st attempts w/o complications. IVs were with good blood return and easily flushed. Both sites were secured with krillex wraps.
Asked by nursing to place IV for 77y.o Female for blood transfusion. Nursing tried first.   18g IV placed with utilizing ultrasound guidance. 18 angio inserted left upper arm, good blood return, secured well, easily flushed with 5cc normal saline flushes multiple times. Nursing aware. Patient tolerated procedure well. Sharps disposed of in a safe way.

## 2023-01-29 NOTE — PROGRESS NOTE ADULT - SUBJECTIVE AND OBJECTIVE BOX
[facility] Division of Hospital Medicine    Chief Complaint: diverticular bleed      SUBJECTIVE / OVERNIGHT EVENTS: Overnight, patient had new episode of Afib w/ RVR and one large dark bloody BM. Denies n/v/f/c/h/d and abdominal pain.     Patient denies chest pain, SOB, abd pain, N/V, fever, chills, dysuria or any other complaints. All remainder ROS negative.     MEDICATIONS  (STANDING):  allopurinol 100 milliGRAM(s) Oral daily  atorvastatin 40 milliGRAM(s) Oral at bedtime  balsalazide 2250 milliGRAM(s) Oral <User Schedule>  bisacodyl 5 milliGRAM(s) Oral at bedtime  chlorhexidine 2% Cloths 1 Application(s) Topical <User Schedule>  cholecalciferol 2000 Unit(s) Oral daily  clotrimazole/betamethasone Cream 1 Application(s) Topical two times a day  cyanocobalamin 1000 MICROGram(s) Oral daily  dextrose 5%. 1000 milliLiter(s) (100 mL/Hr) IV Continuous <Continuous>  dextrose 5%. 1000 milliLiter(s) (50 mL/Hr) IV Continuous <Continuous>  dextrose 50% Injectable 25 Gram(s) IV Push once  dextrose 50% Injectable 12.5 Gram(s) IV Push once  dextrose 50% Injectable 25 Gram(s) IV Push once  fluconAZOLE   Tablet 100 milliGRAM(s) Oral daily  glucagon  Injectable 1 milliGRAM(s) IntraMuscular once  insulin lispro (ADMELOG) corrective regimen sliding scale   SubCutaneous three times a day before meals  levothyroxine 125 MICROGram(s) Oral daily  montelukast 10 milliGRAM(s) Oral daily  pantoprazole  Injectable 40 milliGRAM(s) IV Push two times a day  piperacillin/tazobactam IVPB.. 3.375 Gram(s) IV Intermittent every 8 hours  polyethylene glycol/electrolyte Solution. 4000 milliLiter(s) Oral once    MEDICATIONS  (PRN):  albuterol    90 MICROgram(s) HFA Inhaler 2 Puff(s) Inhalation every 6 hours PRN Shortness of Breath and/or Wheezing  dextrose Oral Gel 15 Gram(s) Oral once PRN Blood Glucose LESS THAN 70 milliGRAM(s)/deciliter  ondansetron Injectable 4 milliGRAM(s) IV Push every 4 hours PRN Nausea and/or Vomiting  oxycodone    5 mG/acetaminophen 325 mG 2 Tablet(s) Oral every 6 hours PRN mod to severe pain        I&O's Summary    28 Jan 2023 07:01  -  29 Jan 2023 07:00  --------------------------------------------------------  IN: 2050 mL / OUT: 0 mL / NET: 2050 mL        PHYSICAL EXAM:  Vital Signs Last 24 Hrs  T(C): 36.6 (29 Jan 2023 08:00), Max: 36.6 (28 Jan 2023 17:42)  T(F): 97.8 (29 Jan 2023 08:00), Max: 97.9 (28 Jan 2023 17:42)  HR: 64 (29 Jan 2023 10:10) (62 - 143)  BP: 115/63 (29 Jan 2023 10:10) (80/62 - 119/96)  BP(mean): 79 (29 Jan 2023 10:10) (60 - 102)  RR: 14 (29 Jan 2023 10:10) (14 - 23)  SpO2: 94% (29 Jan 2023 10:10) (94% - 100%)    Parameters below as of 29 Jan 2023 10:10  Patient On (Oxygen Delivery Method): room air    General:  no acute distress, ill-appearing  Gastrointestinal: Soft, non-tender non-distended; Normal bowel sounds; No rebound or guarding  Extremities: Normal range of motion, No clubbing, cyanosis or edema  Neurological: Alert and oriented x3  Skin: Warm and dry. No obvious rash    LABS:                        7.4    6.27  )-----------( 164      ( 29 Jan 2023 06:05 )             23.2     01-29    145  |  116<H>  |  14.9  ----------------------------<  110<H>  4.2   |  20.0<L>  |  0.69    Ca    7.6<L>      29 Jan 2023 06:05  Phos  3.8     01-28  Mg     1.7     01-28    TPro  4.1<L>  /  Alb  2.4<L>  /  TBili  0.4  /  DBili  x   /  AST  12  /  ALT  8   /  AlkPhos  40  01-29    PT/INR - ( 28 Jan 2023 15:00 )   PT: 12.4 sec;   INR: 1.07 ratio         PTT - ( 28 Jan 2023 15:00 )  PTT:26.8 sec  CARDIAC MARKERS ( 28 Jan 2023 23:10 )  x     / <0.01 ng/mL / x     / x     / x              CAPILLARY BLOOD GLUCOSE      POCT Blood Glucose.: 112 mg/dL (29 Jan 2023 08:06)  POCT Blood Glucose.: 139 mg/dL (28 Jan 2023 18:21)  POCT Blood Glucose.: 107 mg/dL (28 Jan 2023 12:27)        RADIOLOGY & ADDITIONAL TESTS:  Results Reviewed:   Imaging Personally Reviewed:  Electrocardiogram Personally Reviewed:

## 2023-01-29 NOTE — PROGRESS NOTE ADULT - ASSESSMENT
77F with a history of UC on 5-asa here with acute GI bleeding. She was found to have gastric ulcers at an outside hospital. CTA today showed extravasation of right colonic diverticulum.   plan colonoscopy tomorrow source bleeding

## 2023-01-29 NOTE — CONSULT NOTE ADULT - SUBJECTIVE AND OBJECTIVE BOX
77F with PMHX of of Asthma, HTN, HLD, hypothyroid, DM, UC , diverticulitis admitted to medicine of LGIB, patient was transfer from [facility] ICU of [facility]  noted to have multiple large episodes of hematochezia with drop in Hgb 8.4 <--13.9, patient was admitted to [facility] ICU of hemorrhagic shock received  PRBC x 6, Platelets x 2, FFP x 2, and cryoprecipitate. EGD at [facility] and found to have gastric ulcers not actively bleeding, patient then was transferred to [facility] for IR evaluation, patient had CTA with  active gastrointestinal bleeding is identified in the right colon in the region of a right-sided colonic diverticulum. went to IR angiogram showed no active extrav, patient continued to have bloody BM , H/H continued to drop down , 1 PRBCs given. patient is HD stable. denies abdominal pain/ nausea/ vomiting. last colonoscopy was about 4 years ago was unremarkable. denies recent weight loss , SOB, chest pain.           constipation/diarrhea.      ROS: 10-system review is otherwise negative except HPI above.      PAST MEDICAL & SURGICAL HISTORY:    FAMILY HISTORY:    Family history not pertinent as reviewed with the patient.    SOCIAL HISTORY:  Denies any toxic habits    ALLERGIES: NKA No Known Allergies      HOME MEDICATIONS:   Home Medications:  Albuterol (Eqv-ProAir HFA) 90 mcg/inh inhalation aerosol: 2 puff(s) inhaled every 6 hours, As Needed (26 Jan 2023 10:43)  allopurinol 100 mg oral tablet: 1 tab(s) orally once a day (26 Jan 2023 10:43)  Aspir 81 oral delayed release tablet: 1 tab(s) orally once a day (26 Jan 2023 10:43)  atorvastatin 40 mg oral tablet: 1 tab(s) orally once a day (26 Jan 2023 10:43)  balsalazide 750 mg oral capsule: 3 cap(s) orally once a day (26 Jan 2023 10:43)  empagliflozin 25 mg oral tablet: 1 tab(s) orally once a day (in the morning) (26 Jan 2023 10:43)  fluconazole 100 mg oral tablet: 1 tab(s) orally once a day  filled 1/10/23 x 30 day supply (26 Jan 2023 10:43)  levothyroxine 125 mcg (0.125 mg) oral tablet: 1 tab(s) orally once a day (26 Jan 2023 10:43)  lisinopril 40 mg oral tablet: 1 tab(s) orally once a day (26 Jan 2023 10:43)  montelukast 10 mg oral tablet: 1 tab(s) orally once a day (26 Jan 2023 10:43)  torsemide 20 mg oral tablet: 1 tab(s) orally once a day (26 Jan 2023 10:43)  tramadol-acetaminophen 37.5 mg-325 mg oral tablet: 1 tab(s) orally every 6 hours, As Needed  filled 1/10/23 x 15 day supply (26 Jan 2023 10:43)      --------------------------------------------------------------------------------------------    PHYSICAL EXAM:   General: NAD, Lying in bed comfortably  Neuro: A+Ox3  HEENT: EOMI, PERRLA, MMM  Cardio: RRR  Resp: Non labored breathing on RA  GI/Abd: Soft, NT/ND  Vascular: All 4 extremities warm and well perfused.   Musculoskeletal: All 4 extremities moving spontaneously.  --------------------------------------------------------------------------------------------    LABS                 7.4    6.27   )----------(  164       ( 29 Jan 2023 06:05 )               23.2      145    |  116    |  14.9   ----------------------------<  110        ( 29 Jan 2023 06:05 )  4.2     |  20.0   |  0.69     Ca    7.6        ( 29 Jan 2023 06:05 )  Phos  3.8       ( 28 Jan 2023 23:10 )  Mg     1.7       ( 28 Jan 2023 23:10 )    TPro  4.1    /  Alb  2.4    /  TBili  0.4    /  DBili  x      /  AST  12     /  ALT  8      /  AlkPhos  40     ( 29 Jan 2023 06:05 )    LIVER FUNCTIONS - ( 29 Jan 2023 06:05 )  Alb: 2.4 g/dL / Pro: 4.1 g/dL / ALK PHOS: 40 U/L / ALT: 8 U/L / AST: 12 U/L / GGT: x               CAPILLARY BLOOD GLUCOSE    CARDIAC MARKERS ( 28 Jan 2023 23:10 )  x     / <0.01 ng/mL / x     / x     / x                --------------------------------------------------------------------------------------------  IMAGING  < from: CT Abdomen and Pelvis w/wo IV Cont (01.28.23 @ 12:52) >  MPRESSION:    A site of active gastrointestinal bleeding is identified in the right   colon in the region of a right-sided colonic diverticulum (62-66; 8 and   61-65; 11).    There has been an increase in the amount of liquid and semisolid stool in   the colon.    There has otherwise been no significant change from the recent prior CTA   study of 1/25/2023.    COMMUNICATION:  I communicated the findings of this report the telephone   to the [employer] nurse, Saul Hinkle, at [facility]  on   1/28/2023 at 1:35 PM.  Critical value policy of the hospital was   followed.  Read back and confirmation of receipt of this communication   was performed.  This verbal communication supplements the text report of   this document.    --- End of Report ---      < end of copied text >  < from: CT Angio Abdomen and Pelvis w/ IV Cont (01.25.23 @ 16:26) >  IMPRESSION:  Acute uncomplicated sigmoid diverticulitis.  No site of active GI hemorrhage identified    --- End of Report ---    < end of copied text >

## 2023-01-29 NOTE — CONSULT NOTE ADULT - ASSESSMENT
76 y/o F with PMHx of HTN, HLD, hypothyroidism, DM type 2, and asthma, transferred from [facility] with acute blood loss anemia, lower GI bleed secondary to sigmoid diverticulitis, CORKY, metabolic acidosis.    EGD at [facility] with multiple gastric and duodenal ulcers without stigmata of recent bleeding  Initial CTA negative, however highly suspicious for diverticular bleeding  Now with recurrent lower GI bleeding, CTA positive for diverticular bleed in right hemicolon  Hemodynamics and H/H have remained fairly stable, does not require blood transfusion at present  IR consulted, planned for possible embolization this evening.  Attempted to evaluate patient on both 4T and 3T, however she had just been taken to IR for intervention    At this time, given serial H/H and present hemodynamics she does not require ICU level of care.  Will attempt to reassess patient post-procedurally   Please reconsult should clinical condition change  Of note, would hold her antihypertensives and diuretics in the interim (she received both this morning)   
This is 78 y/o female with hx of asthma, HTN, HLD, ulcerative colitis who [place] [facility] [place] Pt was found hypotensive, with acute drop in Hgb and received multiple blood products. CT abdomen/pelvis demonstrated diverticulosis. Pt underwent EGD on 1/25 which revealed several clean based gastric and duodenal ulcers without active bleeding. Pt was left intubated and was transferred to [facility] for possible IR intervention. CT angio positive for diverticular bleed in right hemicolon. This evening pt was scheduled for IR procedure but no active extravasation was identified and no embolization was performed. After returning to the floor, pt was noted to have AFib with RVR to 160's on telemetry. Pt is asymptomatic, denies chest pain, SOB or palpitations. No prior hx of AFib. She used to follow with a cardiologist from [place] for routine checkups but hasn't seen anybody in 6 years. 
77F with PMHX of of Asthma, HTN, HLD, hypothyroid, DM, UC , diverticulitis admitted to medicine of LGIB, patient noted to have multiple large episodes of hematochezia with drop in Hgb 8.4 <--13.9 and hemorrhagic shock received  PRBC x 6, Platelets x 2, FFP x 2, and cryoprecipitate. EGD at [facility] and found to have gastric ulcers not actively bleeding, CTA with  active gastrointestinal bleeding is identified in the right colon in the region of a right-sided colonic diverticulum. IR angiogram showed no active extrav, patient continued to have bloody BM , H/H continued to trenddown , 1 PRBCs given. patient is HD stable. denies abdominal pain/ nausea/ vomiting. last colonoscopy was about 4 years ago was unremarkable. denies recent weight loss.     PLAN:    - f/u GI for colonoscopy   - trend H/H   - monitor HD   - transfuse per parameter  - NPO/IVF  - pain control  - DVT ppx (SCDs)  - OOB/ambulate  - strict I/Os  - will follow   - Plan discussed with Attending, Dr. Clifford 
76 y/o female w PMHx of Ulcerative colitis (diagnosed 8 years ago) Asthma, HTN, HLD, hypothyroid, DM, transferred from [facility] ICU for acute blood loss anemia, hematochezia.

## 2023-01-30 ENCOUNTER — RESULT REVIEW (OUTPATIENT)
Age: 78
End: 2023-01-30

## 2023-01-30 LAB
ALBUMIN SERPL ELPH-MCNC: 2.8 G/DL — LOW (ref 3.3–5.2)
ALP SERPL-CCNC: 42 U/L — SIGNIFICANT CHANGE UP (ref 40–120)
ALT FLD-CCNC: 9 U/L — SIGNIFICANT CHANGE UP
ANION GAP SERPL CALC-SCNC: 8 MMOL/L — SIGNIFICANT CHANGE UP (ref 5–17)
AST SERPL-CCNC: 13 U/L — SIGNIFICANT CHANGE UP
BILIRUB SERPL-MCNC: 0.4 MG/DL — SIGNIFICANT CHANGE UP (ref 0.4–2)
BUN SERPL-MCNC: 14.1 MG/DL — SIGNIFICANT CHANGE UP (ref 8–20)
CALCIUM SERPL-MCNC: 8.7 MG/DL — SIGNIFICANT CHANGE UP (ref 8.4–10.5)
CHLORIDE SERPL-SCNC: 113 MMOL/L — HIGH (ref 96–108)
CO2 SERPL-SCNC: 23 MMOL/L — SIGNIFICANT CHANGE UP (ref 22–29)
CREAT SERPL-MCNC: 0.66 MG/DL — SIGNIFICANT CHANGE UP (ref 0.5–1.3)
EGFR: 90 ML/MIN/1.73M2 — SIGNIFICANT CHANGE UP
GLUCOSE BLDC GLUCOMTR-MCNC: 105 MG/DL — HIGH (ref 70–99)
GLUCOSE BLDC GLUCOMTR-MCNC: 153 MG/DL — HIGH (ref 70–99)
GLUCOSE SERPL-MCNC: 103 MG/DL — HIGH (ref 70–99)
HCT VFR BLD CALC: 21.6 % — LOW (ref 34.5–45)
HCT VFR BLD CALC: 24.7 % — LOW (ref 34.5–45)
HCT VFR BLD CALC: 26.4 % — LOW (ref 34.5–45)
HGB BLD-MCNC: 7 G/DL — CRITICAL LOW (ref 11.5–15.5)
HGB BLD-MCNC: 8.2 G/DL — LOW (ref 11.5–15.5)
HGB BLD-MCNC: 8.3 G/DL — LOW (ref 11.5–15.5)
MCHC RBC-ENTMCNC: 29.9 PG — SIGNIFICANT CHANGE UP (ref 27–34)
MCHC RBC-ENTMCNC: 30.7 PG — SIGNIFICANT CHANGE UP (ref 27–34)
MCHC RBC-ENTMCNC: 31.3 PG — SIGNIFICANT CHANGE UP (ref 27–34)
MCHC RBC-ENTMCNC: 31.4 GM/DL — LOW (ref 32–36)
MCHC RBC-ENTMCNC: 32.4 GM/DL — SIGNIFICANT CHANGE UP (ref 32–36)
MCHC RBC-ENTMCNC: 33.2 GM/DL — SIGNIFICANT CHANGE UP (ref 32–36)
MCV RBC AUTO: 94.3 FL — SIGNIFICANT CHANGE UP (ref 80–100)
MCV RBC AUTO: 94.7 FL — SIGNIFICANT CHANGE UP (ref 80–100)
MCV RBC AUTO: 95 FL — SIGNIFICANT CHANGE UP (ref 80–100)
PLATELET # BLD AUTO: 168 K/UL — SIGNIFICANT CHANGE UP (ref 150–400)
PLATELET # BLD AUTO: 185 K/UL — SIGNIFICANT CHANGE UP (ref 150–400)
PLATELET # BLD AUTO: 205 K/UL — SIGNIFICANT CHANGE UP (ref 150–400)
POTASSIUM SERPL-MCNC: 3.5 MMOL/L — SIGNIFICANT CHANGE UP (ref 3.5–5.3)
POTASSIUM SERPL-SCNC: 3.5 MMOL/L — SIGNIFICANT CHANGE UP (ref 3.5–5.3)
PROT SERPL-MCNC: 4.6 G/DL — LOW (ref 6.6–8.7)
RBC # BLD: 2.28 M/UL — LOW (ref 3.8–5.2)
RBC # BLD: 2.62 M/UL — LOW (ref 3.8–5.2)
RBC # BLD: 2.78 M/UL — LOW (ref 3.8–5.2)
RBC # FLD: 14.9 % — HIGH (ref 10.3–14.5)
RBC # FLD: 15 % — HIGH (ref 10.3–14.5)
RBC # FLD: 15.1 % — HIGH (ref 10.3–14.5)
SODIUM SERPL-SCNC: 144 MMOL/L — SIGNIFICANT CHANGE UP (ref 135–145)
WBC # BLD: 10.01 K/UL — SIGNIFICANT CHANGE UP (ref 3.8–10.5)
WBC # BLD: 8.48 K/UL — SIGNIFICANT CHANGE UP (ref 3.8–10.5)
WBC # BLD: 9.4 K/UL — SIGNIFICANT CHANGE UP (ref 3.8–10.5)
WBC # FLD AUTO: 10.01 K/UL — SIGNIFICANT CHANGE UP (ref 3.8–10.5)
WBC # FLD AUTO: 8.48 K/UL — SIGNIFICANT CHANGE UP (ref 3.8–10.5)
WBC # FLD AUTO: 9.4 K/UL — SIGNIFICANT CHANGE UP (ref 3.8–10.5)

## 2023-01-30 PROCEDURE — 88305 TISSUE EXAM BY PATHOLOGIST: CPT | Mod: 26

## 2023-01-30 PROCEDURE — 36556 INSERT NON-TUNNEL CV CATH: CPT

## 2023-01-30 PROCEDURE — 99233 SBSQ HOSP IP/OBS HIGH 50: CPT

## 2023-01-30 PROCEDURE — 76882 US LMTD JT/FCL EVL NVASC XTR: CPT | Mod: 26,RT

## 2023-01-30 PROCEDURE — 45380 COLONOSCOPY AND BIOPSY: CPT

## 2023-01-30 PROCEDURE — 99232 SBSQ HOSP IP/OBS MODERATE 35: CPT

## 2023-01-30 PROCEDURE — 76942 ECHO GUIDE FOR BIOPSY: CPT | Mod: 26,59

## 2023-01-30 PROCEDURE — 93306 TTE W/DOPPLER COMPLETE: CPT | Mod: 26

## 2023-01-30 PROCEDURE — 76937 US GUIDE VASCULAR ACCESS: CPT | Mod: 26

## 2023-01-30 RX ADMIN — Medication 1 TABLET(S): at 17:21

## 2023-01-30 RX ADMIN — PREGABALIN 1000 MICROGRAM(S): 225 CAPSULE ORAL at 14:51

## 2023-01-30 RX ADMIN — Medication 2: at 17:37

## 2023-01-30 RX ADMIN — PANTOPRAZOLE SODIUM 40 MILLIGRAM(S): 20 TABLET, DELAYED RELEASE ORAL at 06:38

## 2023-01-30 RX ADMIN — CHLORHEXIDINE GLUCONATE 1 APPLICATION(S): 213 SOLUTION TOPICAL at 05:43

## 2023-01-30 RX ADMIN — CLOTRIMAZOLE AND BETAMETHASONE DIPROPIONATE 1 APPLICATION(S): 10; .5 CREAM TOPICAL at 05:42

## 2023-01-30 RX ADMIN — Medication 2000 UNIT(S): at 14:39

## 2023-01-30 RX ADMIN — PANTOPRAZOLE SODIUM 40 MILLIGRAM(S): 20 TABLET, DELAYED RELEASE ORAL at 17:24

## 2023-01-30 RX ADMIN — ATORVASTATIN CALCIUM 40 MILLIGRAM(S): 80 TABLET, FILM COATED ORAL at 21:40

## 2023-01-30 RX ADMIN — PIPERACILLIN AND TAZOBACTAM 25 GRAM(S): 4; .5 INJECTION, POWDER, LYOPHILIZED, FOR SOLUTION INTRAVENOUS at 06:34

## 2023-01-30 RX ADMIN — Medication 125 MICROGRAM(S): at 05:42

## 2023-01-30 RX ADMIN — FLUCONAZOLE 100 MILLIGRAM(S): 150 TABLET ORAL at 14:39

## 2023-01-30 RX ADMIN — Medication 100 MILLIGRAM(S): at 14:39

## 2023-01-30 RX ADMIN — CLOTRIMAZOLE AND BETAMETHASONE DIPROPIONATE 1 APPLICATION(S): 10; .5 CREAM TOPICAL at 17:21

## 2023-01-30 NOTE — PROGRESS NOTE ADULT - NS ATTEND AMEND GEN_ALL_CORE FT
78 y/o F [place] [facility] [place]um requiring multiple transfusions; s/p EGD with clean based gastric/duodenal ulcers, transferred to [facility] for further management. Initial CTA showed acute diverticulitis with no active bleeding; repeat study after additional episodes of rectal bleeding showed R sided diverticular bleeding. Cardiology consulted due to new onset AF.     -Now in sinus rhythm after amiodarone administration. Anemia likely exacerbated AF.   -Transfuse as needed to maintain hgb >8  -CHADSVASC 5 (age, HTN, DM, female), however patient is unable to tolerate AC at this time due to ongoing anemia and GI bleeding   -If patient reverts to AF, would avoid further amiodarone unless able to tolerate AC due to potential risk of thromboembolism with cardioversion   -TTE pending today but would not defer colonoscopy for this. Does not appear to have decompensated heart failure by any means.   -Plan for colonoscopy today.     We will sign off. Patient will need outpatient follow up with Cardiology and EP. Will consider left atrial appendage occlusion given high risks of anticoagulation.     Cuba Marie MD  Interventional and Structural Heart.

## 2023-01-30 NOTE — PROGRESS NOTE ADULT - SUBJECTIVE AND OBJECTIVE BOX
INTERVAL HPI/OVERNIGHT EVENTS:    NAOE. Patient denies N/V/CP/SOB, no subjective fevers or chills. Pain well controlled. Hgb remains stable this morning.    MEDICATIONS  (STANDING):  allopurinol 100 milliGRAM(s) Oral daily  atorvastatin 40 milliGRAM(s) Oral at bedtime  balsalazide 2250 milliGRAM(s) Oral <User Schedule>  bisacodyl 5 milliGRAM(s) Oral at bedtime  chlorhexidine 2% Cloths 1 Application(s) Topical <User Schedule>  cholecalciferol 2000 Unit(s) Oral daily  clotrimazole/betamethasone Cream 1 Application(s) Topical two times a day  cyanocobalamin 1000 MICROGram(s) Oral daily  dextrose 5%. 1000 milliLiter(s) (100 mL/Hr) IV Continuous <Continuous>  dextrose 5%. 1000 milliLiter(s) (50 mL/Hr) IV Continuous <Continuous>  dextrose 50% Injectable 25 Gram(s) IV Push once  dextrose 50% Injectable 12.5 Gram(s) IV Push once  dextrose 50% Injectable 25 Gram(s) IV Push once  fluconAZOLE   Tablet 100 milliGRAM(s) Oral daily  glucagon  Injectable 1 milliGRAM(s) IntraMuscular once  insulin lispro (ADMELOG) corrective regimen sliding scale   SubCutaneous three times a day before meals  levothyroxine 125 MICROGram(s) Oral daily  montelukast 10 milliGRAM(s) Oral daily  pantoprazole  Injectable 40 milliGRAM(s) IV Push two times a day  piperacillin/tazobactam IVPB.. 3.375 Gram(s) IV Intermittent every 8 hours    MEDICATIONS  (PRN):  albuterol    90 MICROgram(s) HFA Inhaler 2 Puff(s) Inhalation every 6 hours PRN Shortness of Breath and/or Wheezing  dextrose Oral Gel 15 Gram(s) Oral once PRN Blood Glucose LESS THAN 70 milliGRAM(s)/deciliter  ondansetron Injectable 4 milliGRAM(s) IV Push every 4 hours PRN Nausea and/or Vomiting  oxycodone    5 mG/acetaminophen 325 mG 2 Tablet(s) Oral every 6 hours PRN mod to severe pain      Vital Signs Last 24 Hrs  T(C): 36.6 (30 Jan 2023 04:37), Max: 36.8 (29 Jan 2023 16:24)  T(F): 97.9 (30 Jan 2023 04:37), Max: 98.3 (29 Jan 2023 20:00)  HR: 57 (30 Jan 2023 04:00) (57 - 77)  BP: 108/62 (30 Jan 2023 02:00) (100/57 - 119/46)  BP(mean): 75 (29 Jan 2023 18:00) (65 - 79)  RR: 16 (30 Jan 2023 04:00) (14 - 23)  SpO2: 97% (30 Jan 2023 04:00) (94% - 100%)    Parameters below as of 30 Jan 2023 04:00  Patient On (Oxygen Delivery Method): room air      PHYSICAL EXAM:   General: NAD, Lying in bed comfortably  Neuro: A&Ox3  HEENT: EOMI, PERRLA, MMM  Cardio: RRR  Resp: Respirations even, unlabored on room air  GI/Abd: Soft, NT/ND  Vascular: All 4 extremities warm and well perfused.   Musculoskeletal: All 4 extremities moving spontaneously.      I&O's Detail    28 Jan 2023 07:01  -  29 Jan 2023 07:00  --------------------------------------------------------  IN:    Oral Fluid: 750 mL    sodium chloride 0.9%: 1300 mL  Total IN: 2050 mL    OUT:  Total OUT: 0 mL    Total NET: 2050 mL          LABS:                        8.3    9.40  )-----------( 205      ( 30 Jan 2023 01:20 )             26.4     01-29    145  |  116<H>  |  14.9  ----------------------------<  110<H>  4.2   |  20.0<L>  |  0.69    Ca    7.6<L>      29 Jan 2023 06:05  Phos  3.8     01-28  Mg     1.7     01-28    TPro  4.1<L>  /  Alb  2.4<L>  /  TBili  0.4  /  DBili  x   /  AST  12  /  ALT  8   /  AlkPhos  40  01-29    PT/INR - ( 28 Jan 2023 15:00 )   PT: 12.4 sec;   INR: 1.07 ratio         PTT - ( 28 Jan 2023 15:00 )  PTT:26.8 sec

## 2023-01-30 NOTE — PROGRESS NOTE ADULT - ASSESSMENT
78 y/o F with PMHx of HTN, HLD, hypothyroidism, DM type 2, and asthma who [place] [facility] [place] complaining of large episode of hematochezia. Upon arrival to ER, had two additional episodes. Pt with hgb 13.9 on presentation. Overnight, had an acute drop in hgb (repeat 8.4). Pt received a total of 6u PRBCs, 2u platelets, 2u FFP, and cryo. Underwent EGD on 1/25 which was revealing of clean based gastric and duodenal ulcers, unlikely to explain pt's severe GI bleeding. Pt was transferred to [facility] for CTA abd/pelvis and possible IR intervention in the setting of suspected lower GI bleed. Pt remained intubated s/p EGD. CTA abd/pelvis revealed acute uncomplicated sigmoid diverticulitis, and no site of active GI hemorrhage was identified. extubated in ICU and monitored w/o further gi hemorrhaged. stable for transfer to medicine.    acute blood loss anemia: likely diverticular    PPI BID  - CTA A/P w/ active gastrointestinal bleeding is identified in the right colon in the region of a right-sided colonic diverticulum   - S/p IR angiogram w/ no active extravasation.  - Appreciate GI recs- colonoscopy notable for severe diverticulosis  - Appreciate surgery recs  - will trend cbc Q6H, keep Hg> 8    Afib w/ RVR- new  - S/p IV lopressor, x 1. S/p amiodarone 150 mg  - CHADVASc- 5, but elevated risk of bleeding  - TTE reviewed  - c/w tele monitor      HTN  - will hold lasix and lisinopril    Peptic ulcer disease:  - c/w PPI BID    acute diverticulitis:  - transition augmentin till 1/31    UC: restart home balsalazide 750 mg oral capsule 3 cap(s) orally once a day   family brought it in     hypothyroid: synthroid.    Dm2: a1c 5.2.     Diet- CLD. NPO for possible colonoscopy  Unable to reach patient's daughter, Xavier/Lisseth on 1/30. Left VM.

## 2023-01-30 NOTE — CHART NOTE - NSCHARTNOTEFT_GEN_A_CORE
Pt seen at bedside for ecchymosis to R bicep. Per RN has worsened during the duration of shift.   Denies chest pain, SOB, n/v/d/c, abdominal pain, HA, dizziness, blurry vision  All vital signs stable  On exam ecchymosis to R bicep surrounding IV site. Small superficial nodule noted proximal to IV site with tenderness to palpation over the area. +FROM. 2+ palpable pulses. Otherwise normal temp/color to affected extremity.    RUE ultrasound ordered  Compresses/elevation to the area applied

## 2023-01-30 NOTE — PROGRESS NOTE ADULT - SUBJECTIVE AND OBJECTIVE BOX
[facility] Division of Hospital Medicine    Chief Complaint: diverticular bleed       SUBJECTIVE / OVERNIGHT EVENTS: Tele notable for bigeminy and PVCs. HD stable. Denies n/v/f/c/h/d and abdominal pain.     Patient denies chest pain, SOB, abd pain, N/V, fever, chills, dysuria or any other complaints. All remainder ROS negative.     MEDICATIONS  (STANDING):  allopurinol 100 milliGRAM(s) Oral daily  atorvastatin 40 milliGRAM(s) Oral at bedtime  balsalazide 2250 milliGRAM(s) Oral <User Schedule>  bisacodyl 5 milliGRAM(s) Oral at bedtime  chlorhexidine 2% Cloths 1 Application(s) Topical <User Schedule>  cholecalciferol 2000 Unit(s) Oral daily  clotrimazole/betamethasone Cream 1 Application(s) Topical two times a day  cyanocobalamin 1000 MICROGram(s) Oral daily  dextrose 5%. 1000 milliLiter(s) (100 mL/Hr) IV Continuous <Continuous>  dextrose 5%. 1000 milliLiter(s) (50 mL/Hr) IV Continuous <Continuous>  dextrose 50% Injectable 25 Gram(s) IV Push once  dextrose 50% Injectable 12.5 Gram(s) IV Push once  dextrose 50% Injectable 25 Gram(s) IV Push once  fluconAZOLE   Tablet 100 milliGRAM(s) Oral daily  glucagon  Injectable 1 milliGRAM(s) IntraMuscular once  insulin lispro (ADMELOG) corrective regimen sliding scale   SubCutaneous three times a day before meals  levothyroxine 125 MICROGram(s) Oral daily  montelukast 10 milliGRAM(s) Oral daily  pantoprazole  Injectable 40 milliGRAM(s) IV Push two times a day  piperacillin/tazobactam IVPB.. 3.375 Gram(s) IV Intermittent every 8 hours    MEDICATIONS  (PRN):  albuterol    90 MICROgram(s) HFA Inhaler 2 Puff(s) Inhalation every 6 hours PRN Shortness of Breath and/or Wheezing  dextrose Oral Gel 15 Gram(s) Oral once PRN Blood Glucose LESS THAN 70 milliGRAM(s)/deciliter  ondansetron Injectable 4 milliGRAM(s) IV Push every 4 hours PRN Nausea and/or Vomiting  oxycodone    5 mG/acetaminophen 325 mG 2 Tablet(s) Oral every 6 hours PRN mod to severe pain        I&O's Summary      PHYSICAL EXAM:  Vital Signs Last 24 Hrs  T(C): 37 (30 Jan 2023 07:51), Max: 37 (30 Jan 2023 07:51)  T(F): 98.6 (30 Jan 2023 07:51), Max: 98.6 (30 Jan 2023 07:51)  HR: 67 (30 Jan 2023 10:00) (57 - 77)  BP: 119/78 (30 Jan 2023 10:00) (86/76 - 123/46)  BP(mean): 68 (30 Jan 2023 07:51) (68 - 75)  RR: 19 (30 Jan 2023 10:00) (16 - 23)  SpO2: 95% (30 Jan 2023 10:00) (95% - 100%)    Parameters below as of 30 Jan 2023 10:00  Patient On (Oxygen Delivery Method): room air    General:  no acute distress, ill-appearing  Gastrointestinal: Soft, non-tender non-distended; Normal bowel sounds; No rebound or guarding  Extremities: Normal range of motion, No clubbing, cyanosis or edema  Neurological: Alert and oriented x3  Skin: Warm and dry. No obvious rash    LABS:                        7.0    8.48  )-----------( 168      ( 30 Jan 2023 05:52 )             21.6     01-30    144  |  113<H>  |  14.1  ----------------------------<  103<H>  3.5   |  23.0  |  0.66    Ca    8.7      30 Jan 2023 05:52  Phos  3.8     01-28  Mg     1.7     01-28    TPro  4.6<L>  /  Alb  2.8<L>  /  TBili  0.4  /  DBili  x   /  AST  13  /  ALT  9   /  AlkPhos  42  01-30    PT/INR - ( 28 Jan 2023 15:00 )   PT: 12.4 sec;   INR: 1.07 ratio         PTT - ( 28 Jan 2023 15:00 )  PTT:26.8 sec  CARDIAC MARKERS ( 28 Jan 2023 23:10 )  x     / <0.01 ng/mL / x     / x     / x              CAPILLARY BLOOD GLUCOSE      POCT Blood Glucose.: 105 mg/dL (30 Jan 2023 08:04)  POCT Blood Glucose.: 115 mg/dL (29 Jan 2023 21:57)  POCT Blood Glucose.: 120 mg/dL (29 Jan 2023 17:19)        RADIOLOGY & ADDITIONAL TESTS:  Results Reviewed:   Imaging Personally Reviewed:  Electrocardiogram Personally Reviewed:

## 2023-01-30 NOTE — PROGRESS NOTE ADULT - ASSESSMENT
76 y/o female with hx of asthma, HTN, HLD, ulcerative colitis who [place] [facility] [place]. Pt was found hypotensive, with acute drop in Hgb and received multiple blood products. CT abdomen/pelvis demonstrated diverticulosis. Pt underwent EGD on 1/25 which revealed several clean based gastric and duodenal ulcers without active bleeding. Pt was left intubated and was transferred to [facility] for possible IR intervention. CT angio positive for diverticular bleed in right hemicolon. This evening pt was scheduled for IR procedure but no active extravasation was identified and no embolization was performed. After returning to the floor, pt was noted to have AFib with RVR to 160's on telemetry. Pt is asymptomatic, denies chest pain, SOB or palpitations. No prior hx of AFib. She used to follow with a cardiologist from [place] for routine checkups but hasn't seen anybody in 6 years.

## 2023-01-30 NOTE — PROGRESS NOTE ADULT - ASSESSMENT
77F with PMHX of of Asthma, HTN, HLD, hypothyroid, DM, UC , diverticulitis admitted to medicine of LGIB, patient noted to have multiple large episodes of hematochezia with drop in Hgb 8.4 <--13.9 and hemorrhagic shock received  PRBC x 6, Platelets x 2, FFP x 2, and cryoprecipitate. [place] [facility] [place] found to have gastric ulcers not actively bleeding, CTA with  active gastrointestinal bleeding is identified in the right colon in the region of a right-sided colonic diverticulum. IR angiogram showed no active extrav, patient continued to have bloody BM , H/H continued to trenddown , 1 PRBCs given. patient is HD stable. denies abdominal pain/ nausea/ vomiting. last colonoscopy was about 4 years ago was unremarkable. denies recent weight loss.     PLAN:    - f/u GI for colonoscopy   - trend H/H   - monitor HD   - transfuse per parameter  - NPO/IVF  - pain control  - DVT ppx (SCDs)  - OOB/ambulate  - strict I/Os

## 2023-01-30 NOTE — PROGRESS NOTE ADULT - PROBLEM SELECTOR PLAN 1
.  -new onset, no prior hx of AFib in setting of anemia  -Transfuse as needed to maintain hgb >8  - given amiodarone converted to SR  - would avoid further amiodarone unless able to tolerate AC due to potential risk of thromboembolism with cardioversion  -pt asymptomatic, telemetry monitoring- SR, SB  -pt received Lopressor 5 mg IV with a drop in BP and no effect on HR  -CHADSVASC score 5, however, pt is not a candidate for anticoagulation due to active bleeding  - TTE pending  -will need [facility] for outpatient monitoring.

## 2023-01-30 NOTE — PROGRESS NOTE ADULT - SUBJECTIVE AND OBJECTIVE BOX
[facility]                                                         CARDIOLOGY AT [facility]                                                                  [address], [place] [address]                                                         Telephone: [phone]. Fax:[phone]                                                                             PROGRESS NOTE    Reason for follow up: Afib RVR  Update: awaiting echo. Awaiting colonoscopy today   denies palpitations, shortness of breath      Review of symptoms:   Cardiac:  No chest pain. No dyspnea. No palpitations.  Respiratory: no cough. No dyspnea  Gastrointestinal: No diarrhea. No abdominal pain. No bleeding.   Neuro: No focal neuro complaints.        Vitals:  T(C): 37 (01-30-23 @ 07:51), Max: 37 (01-30-23 @ 07:51)  HR: 74 (01-30-23 @ 07:51) (57 - 77)  BP: 123/46 (01-30-23 @ 07:51) (86/76 - 123/46)  RR: 16 (01-30-23 @ 07:51) (14 - 23)  SpO2: 97% (01-30-23 @ 07:51) (94% - 100%)        Weight (kg): 100 (01-25 @ 14:15)        PHYSICAL EXAM:  Appearance: Comfortable. No acute distress  HEENT:  Atraumatic. Normocephalic.  Normal oral mucosa  Neurologic: A & O x 3, no gross focal deficits.  Cardiovascular: RRR S1 S2, No murmur, no rubs/gallops. No JVD  Respiratory: Lungs clear to auscultation, unlabored   Gastrointestinal:  Soft, Non-tender, + BS  Lower Extremities: No edema  Psychiatry: Patient is calm. No agitation.   Skin: warm and dry.        CURRENT CARDIAC MEDICATIONS:        CURRENT OTHER MEDICATIONS:  albuterol    90 MICROgram(s) HFA Inhaler 2 Puff(s) Inhalation every 6 hours PRN Shortness of Breath and/or Wheezing  montelukast 10 milliGRAM(s) Oral daily  fluconAZOLE   Tablet 100 milliGRAM(s) Oral daily  piperacillin/tazobactam IVPB.. 3.375 Gram(s) IV Intermittent every 8 hours  ondansetron Injectable 4 milliGRAM(s) IV Push every 4 hours PRN Nausea and/or Vomiting  oxycodone    5 mG/acetaminophen 325 mG 2 Tablet(s) Oral every 6 hours PRN mod to severe pain  balsalazide 2250 milliGRAM(s) Oral <User Schedule>  bisacodyl 5 milliGRAM(s) Oral at bedtime  pantoprazole  Injectable 40 milliGRAM(s) IV Push two times a day  allopurinol 100 milliGRAM(s) Oral daily  atorvastatin 40 milliGRAM(s) Oral at bedtime  dextrose Oral Gel 15 Gram(s) Oral once, Stop order after: 1 Doses PRN Blood Glucose LESS THAN 70 milliGRAM(s)/deciliter  glucagon  Injectable 1 milliGRAM(s) IntraMuscular once, Stop order after: 1 Doses  insulin lispro (ADMELOG) corrective regimen sliding scale   SubCutaneous three times a day before meals  levothyroxine 125 MICROGram(s) Oral daily  chlorhexidine 2% Cloths 1 Application(s) Topical <User Schedule>  cholecalciferol 2000 Unit(s) Oral daily  clotrimazole/betamethasone Cream 1 Application(s) Topical two times a day  cyanocobalamin 1000 MICROGram(s) Oral daily          LABS:	 	  ( 28 Jan 2023 23:10 )  Troponin T  <0.01,  CPK  X    , CKMB  X    , BNP X                                7.0    8.48  )-----------( 168      ( 30 Jan 2023 05:52 )             21.6     01-30    144  |  113<H>  |  14.1  ----------------------------<  103<H>  3.5   |  23.0  |  0.66    Ca    8.7      30 Jan 2023 05:52  Phos  3.8     01-28  Mg     1.7     01-28    TPro  4.6<L>  /  Alb  2.8<L>  /  TBili  0.4  /  DBili  x   /  AST  13  /  ALT  9   /  AlkPhos  42  01-30      PT/INR/PTT ( 28 Jan 2023 15:00 )                       :                       :      12.4         :       26.8                  .        .                   .              .           .       1.07        .                                           TSH: Thyroid Stimulating Hormone, Serum: 0.65 uIU/mL  Thyroid Stimulating Hormone, Serum: 0.34 uIU/mL        TELEMETRY: SR, SB, PACs, PVCs, AIVR 3 beats

## 2023-01-31 LAB
ALBUMIN SERPL ELPH-MCNC: 2.9 G/DL — LOW (ref 3.3–5.2)
ALP SERPL-CCNC: 51 U/L — SIGNIFICANT CHANGE UP (ref 40–120)
ALT FLD-CCNC: 8 U/L — SIGNIFICANT CHANGE UP
ANION GAP SERPL CALC-SCNC: 9 MMOL/L — SIGNIFICANT CHANGE UP (ref 5–17)
AST SERPL-CCNC: 13 U/L — SIGNIFICANT CHANGE UP
BILIRUB SERPL-MCNC: 0.6 MG/DL — SIGNIFICANT CHANGE UP (ref 0.4–2)
BUN SERPL-MCNC: 10.1 MG/DL — SIGNIFICANT CHANGE UP (ref 8–20)
CALCIUM SERPL-MCNC: 8.8 MG/DL — SIGNIFICANT CHANGE UP (ref 8.4–10.5)
CHLORIDE SERPL-SCNC: 112 MMOL/L — HIGH (ref 96–108)
CO2 SERPL-SCNC: 22 MMOL/L — SIGNIFICANT CHANGE UP (ref 22–29)
CREAT SERPL-MCNC: 0.69 MG/DL — SIGNIFICANT CHANGE UP (ref 0.5–1.3)
EGFR: 89 ML/MIN/1.73M2 — SIGNIFICANT CHANGE UP
GLUCOSE BLDC GLUCOMTR-MCNC: 110 MG/DL — HIGH (ref 70–99)
GLUCOSE BLDC GLUCOMTR-MCNC: 112 MG/DL — HIGH (ref 70–99)
GLUCOSE BLDC GLUCOMTR-MCNC: 91 MG/DL — SIGNIFICANT CHANGE UP (ref 70–99)
GLUCOSE SERPL-MCNC: 93 MG/DL — SIGNIFICANT CHANGE UP (ref 70–99)
HCT VFR BLD CALC: 22.7 % — LOW (ref 34.5–45)
HCT VFR BLD CALC: 28.9 % — LOW (ref 34.5–45)
HGB BLD-MCNC: 7.8 G/DL — LOW (ref 11.5–15.5)
HGB BLD-MCNC: 9.6 G/DL — LOW (ref 11.5–15.5)
MCHC RBC-ENTMCNC: 31.5 PG — SIGNIFICANT CHANGE UP (ref 27–34)
MCHC RBC-ENTMCNC: 32.2 PG — SIGNIFICANT CHANGE UP (ref 27–34)
MCHC RBC-ENTMCNC: 33.2 GM/DL — SIGNIFICANT CHANGE UP (ref 32–36)
MCHC RBC-ENTMCNC: 34.4 GM/DL — SIGNIFICANT CHANGE UP (ref 32–36)
MCV RBC AUTO: 93.8 FL — SIGNIFICANT CHANGE UP (ref 80–100)
MCV RBC AUTO: 94.8 FL — SIGNIFICANT CHANGE UP (ref 80–100)
PLATELET # BLD AUTO: 185 K/UL — SIGNIFICANT CHANGE UP (ref 150–400)
PLATELET # BLD AUTO: 202 K/UL — SIGNIFICANT CHANGE UP (ref 150–400)
POTASSIUM SERPL-MCNC: 3.5 MMOL/L — SIGNIFICANT CHANGE UP (ref 3.5–5.3)
POTASSIUM SERPL-SCNC: 3.5 MMOL/L — SIGNIFICANT CHANGE UP (ref 3.5–5.3)
PROT SERPL-MCNC: 4.5 G/DL — LOW (ref 6.6–8.7)
RBC # BLD: 2.42 M/UL — LOW (ref 3.8–5.2)
RBC # BLD: 3.05 M/UL — LOW (ref 3.8–5.2)
RBC # FLD: 14.8 % — HIGH (ref 10.3–14.5)
RBC # FLD: 14.9 % — HIGH (ref 10.3–14.5)
SODIUM SERPL-SCNC: 143 MMOL/L — SIGNIFICANT CHANGE UP (ref 135–145)
WBC # BLD: 11.1 K/UL — HIGH (ref 3.8–10.5)
WBC # BLD: 8.03 K/UL — SIGNIFICANT CHANGE UP (ref 3.8–10.5)
WBC # FLD AUTO: 11.1 K/UL — HIGH (ref 3.8–10.5)
WBC # FLD AUTO: 8.03 K/UL — SIGNIFICANT CHANGE UP (ref 3.8–10.5)

## 2023-01-31 PROCEDURE — 99232 SBSQ HOSP IP/OBS MODERATE 35: CPT

## 2023-01-31 PROCEDURE — 99233 SBSQ HOSP IP/OBS HIGH 50: CPT

## 2023-01-31 RX ADMIN — Medication 2000 UNIT(S): at 12:16

## 2023-01-31 RX ADMIN — Medication 1 TABLET(S): at 18:09

## 2023-01-31 RX ADMIN — Medication 100 MILLIGRAM(S): at 11:52

## 2023-01-31 RX ADMIN — Medication 125 MICROGRAM(S): at 05:50

## 2023-01-31 RX ADMIN — CHLORHEXIDINE GLUCONATE 1 APPLICATION(S): 213 SOLUTION TOPICAL at 05:55

## 2023-01-31 RX ADMIN — PANTOPRAZOLE SODIUM 40 MILLIGRAM(S): 20 TABLET, DELAYED RELEASE ORAL at 05:55

## 2023-01-31 RX ADMIN — CLOTRIMAZOLE AND BETAMETHASONE DIPROPIONATE 1 APPLICATION(S): 10; .5 CREAM TOPICAL at 05:50

## 2023-01-31 RX ADMIN — CLOTRIMAZOLE AND BETAMETHASONE DIPROPIONATE 1 APPLICATION(S): 10; .5 CREAM TOPICAL at 18:09

## 2023-01-31 RX ADMIN — MONTELUKAST 10 MILLIGRAM(S): 4 TABLET, CHEWABLE ORAL at 11:52

## 2023-01-31 RX ADMIN — ATORVASTATIN CALCIUM 40 MILLIGRAM(S): 80 TABLET, FILM COATED ORAL at 21:13

## 2023-01-31 RX ADMIN — Medication 1 TABLET(S): at 05:50

## 2023-01-31 RX ADMIN — PREGABALIN 1000 MICROGRAM(S): 225 CAPSULE ORAL at 12:16

## 2023-01-31 RX ADMIN — FLUCONAZOLE 100 MILLIGRAM(S): 150 TABLET ORAL at 11:52

## 2023-01-31 RX ADMIN — PANTOPRAZOLE SODIUM 40 MILLIGRAM(S): 20 TABLET, DELAYED RELEASE ORAL at 18:10

## 2023-01-31 NOTE — PROGRESS NOTE ADULT - NS ATTEND AMEND GEN_ALL_CORE FT
78 y/o female w PMHx of Ulcerative colitis (diagnosed 8 years ago) on 5-asa, Asthma, HTN, HLD, hypothyroid, DM, transferred from [facility] ICU for acute blood loss anemia, hematochezia.  Likely tic bleed. 78 y/o female w PMHx of Ulcerative colitis (diagnosed 8 years ago) on 5-asa, Asthma, HTN, HLD, hypothyroid, DM, [place] [facility] [place] for acute blood loss anemia, hematochezia.  Likely tic bleed.  UC well controlled on current regimen - not reason for current bleeding  on colon no evidence of IBD actually - she will discuss ASA necessity with o/p GI Dr Whitaker  Tolerating regular diet  No active GI issues at this time  Hb downtrend might be equlibration changes, on colon yesterday no evidence of any ongoing blood loss and pt with no blood in stool for us

## 2023-01-31 NOTE — PROGRESS NOTE ADULT - ASSESSMENT
76 y/o F with PMHx of HTN, HLD, hypothyroidism, DM type 2, and asthma who initially presented to [facility] on 1/24 complaining of large episode of hematochezia. Upon arrival to ER, had two additional episodes. Pt with hgb 13.9 on presentation. Overnight, had an acute drop in hgb (repeat 8.4). Pt received a total of 6u PRBCs, 2u platelets, 2u FFP, and cryo. Underwent EGD on 1/25 which was revealing of clean based gastric and duodenal ulcers, unlikely to explain pt's severe GI bleeding. Pt was transferred to [facility] for CTA abd/pelvis and possible IR intervention in the setting of suspected lower GI bleed. Pt remained intubated s/p EGD. CTA abd/pelvis revealed acute uncomplicated sigmoid diverticulitis, and no site of active GI hemorrhage was identified. extubated in ICU and monitored w/o further gi hemorrhaged. stable for transfer to medicine.    acute blood loss anemia: likely diverticular    PPI BID  - CTA A/P w/ active gastrointestinal bleeding is identified in the right colon in the region of a right-sided colonic diverticulum   - S/p IR angiogram w/ no active extravasation.  - Appreciate GI recs- colonoscopy notable for severe diverticulosis without active bleed  - Appreciate surgery recs  - Will transfuse 1U PRBC, x1 to keep Hg> 8  - will need outpatient follow up with Dr. Whitaker for further management    Afib w/ RVR- new  - S/p IV lopressor, x 1. S/p amiodarone 150 mg  - CHADVASc- 5, but elevated risk of bleeding  - TTE reviewed  - c/w tele monitor      HTN  - will hold lasix and lisinopril    Peptic ulcer disease:  - c/w PPI BID    acute diverticulitis:  - c/w augmentin till 1/31    UC: restart home balsalazide 750 mg oral capsule 3 cap(s) orally once a day   family brought it in     hypothyroid: synthroid.    Dm2: a1c 5.2.     Diet- Advanced to regular diet  Unable to reach patient's daughter, Xavier/Lisseth on 1/31. Left VM.    PT eval pending 78 y/o F with PMHx of HTN, HLD, hypothyroidism, DM type 2, and asthma who initially presented to [facility] on 1/24 complaining of large episode of hematochezia. Upon arrival to ER, had two additional episodes. Pt with hgb 13.9 on presentation. Overnight, had an acute drop in hgb (repeat 8.4). Pt received a total of 6u PRBCs, 2u platelets, 2u FFP, and cryo. Underwent EGD on 1/25 which was revealing of clean based gastric and duodenal ulcers, unlikely to explain pt's severe GI bleeding. Pt was transferred to [facility] for CTA abd/pelvis and possible IR intervention in the setting of suspected lower GI bleed. Pt remained intubated s/p EGD. CTA abd/pelvis revealed acute uncomplicated sigmoid diverticulitis, and no site of active GI hemorrhage was identified. extubated in ICU and monitored w/o further gi hemorrhaged. stable for transfer to medicine.    acute blood loss anemia: likely diverticular    PPI BID  - CTA A/P w/ active gastrointestinal bleeding is identified in the right colon in the region of a right-sided colonic diverticulum   - S/p IR angiogram w/ no active extravasation.  - Appreciate GI recs- colonoscopy notable for severe diverticulosis without active bleed  - Appreciate surgery recs  - Will transfuse 1U PRBC, x1 to keep Hg> 8  - will need outpatient follow up with Dr. Whitaker for further management    Afib w/ RVR- new  - S/p IV lopressor, x 1. S/p amiodarone 150 mg  - CHADVASc- 5, but elevated risk of bleeding  - TTE reviewed  - c/w tele monitor      HTN  - will hold lasix and lisinopril    Peptic ulcer disease:  - c/w PPI BID    acute diverticulitis:  - c/w augmentin till 1/31    UC: restart home balsalazide 750 mg oral capsule 3 cap(s) orally once a day   family brought it in     hypothyroid: synthroid.    Dm2: a1c 5.2.     Diet- Advanced to regular diet  Update patient's daughter, Xavier ([phone]) on 1/31    Dispo- pending clinical improvement. PT eval pending

## 2023-01-31 NOTE — PROGRESS NOTE ADULT - ASSESSMENT
78 y/o female w PMHx of Ulcerative colitis (diagnosed 8 years ago) on 5-asa, Asthma, HTN, HLD, hypothyroid, DM, transferred from [facility] ICU for acute blood loss anemia, hematochezia.      Acute Blood Loss Anemia   EGD on 1/25 at [facility] which revealed multiple clean based gastric and duodenal ulcers without stigmata of recent bleeding.   S/p IR angiogram on 1/28 w/ no active extravasation.  Colonoscopy on 1/30 with no evidence of UC anywhere and revealing severe diverticulosis, Grade I internal hemorrhoids.  Today, Hemoglobin stable at 7.8gm.   - Continue to monitor CBC, transfuse as needed     - Continue PPI BID   - Can advance diet as tolerated   - Avoid NSAIDs  - OPT F/U with her Gastroenterologist Dr. Whitaker for further management   - No further GI recommendations at this time. GI to sign-off, please reconsult as needed.     _________________________________________________________________  Assessment and recommendations are final when note is signed by the attending physician.

## 2023-01-31 NOTE — PROGRESS NOTE ADULT - SUBJECTIVE AND OBJECTIVE BOX
[facility] Division of Hospital Medicine    Chief Complaint: diverticular bleed      SUBJECTIVE / OVERNIGHT EVENTS: No acute events overnight. Denies n/v/fc/c/h/d.     Patient denies chest pain, SOB, abd pain, N/V, fever, chills, dysuria or any other complaints. All remainder ROS negative.     MEDICATIONS  (STANDING):  allopurinol 100 milliGRAM(s) Oral daily  amoxicillin  875 milliGRAM(s)/clavulanate 1 Tablet(s) Oral two times a day  atorvastatin 40 milliGRAM(s) Oral at bedtime  balsalazide 2250 milliGRAM(s) Oral <User Schedule>  bisacodyl 5 milliGRAM(s) Oral at bedtime  chlorhexidine 2% Cloths 1 Application(s) Topical <User Schedule>  cholecalciferol 2000 Unit(s) Oral daily  clotrimazole/betamethasone Cream 1 Application(s) Topical two times a day  cyanocobalamin 1000 MICROGram(s) Oral daily  dextrose 5%. 1000 milliLiter(s) (100 mL/Hr) IV Continuous <Continuous>  dextrose 5%. 1000 milliLiter(s) (50 mL/Hr) IV Continuous <Continuous>  dextrose 50% Injectable 25 Gram(s) IV Push once  dextrose 50% Injectable 12.5 Gram(s) IV Push once  dextrose 50% Injectable 25 Gram(s) IV Push once  glucagon  Injectable 1 milliGRAM(s) IntraMuscular once  insulin lispro (ADMELOG) corrective regimen sliding scale   SubCutaneous three times a day before meals  levothyroxine 125 MICROGram(s) Oral daily  montelukast 10 milliGRAM(s) Oral daily  pantoprazole  Injectable 40 milliGRAM(s) IV Push two times a day    MEDICATIONS  (PRN):  albuterol    90 MICROgram(s) HFA Inhaler 2 Puff(s) Inhalation every 6 hours PRN Shortness of Breath and/or Wheezing  dextrose Oral Gel 15 Gram(s) Oral once PRN Blood Glucose LESS THAN 70 milliGRAM(s)/deciliter  ondansetron Injectable 4 milliGRAM(s) IV Push every 4 hours PRN Nausea and/or Vomiting  oxycodone    5 mG/acetaminophen 325 mG 2 Tablet(s) Oral every 6 hours PRN mod to severe pain        I&O's Summary    31 Jan 2023 07:01  -  31 Jan 2023 12:49  --------------------------------------------------------  IN: 350 mL / OUT: 0 mL / NET: 350 mL        PHYSICAL EXAM:  Vital Signs Last 24 Hrs  T(C): 36.7 (31 Jan 2023 11:09), Max: 37.1 (31 Jan 2023 00:01)  T(F): 98.1 (31 Jan 2023 11:09), Max: 98.7 (31 Jan 2023 00:01)  HR: 62 (31 Jan 2023 12:00) (62 - 78)  BP: 134/65 (31 Jan 2023 12:00) (112/65 - 148/62)  BP(mean): 87 (31 Jan 2023 12:00) (64 - 96)  RR: 21 (31 Jan 2023 12:00) (16 - 22)  SpO2: 100% (31 Jan 2023 12:00) (93% - 100%)    Parameters below as of 31 Jan 2023 06:00  Patient On (Oxygen Delivery Method): room air    General:  no acute distress, ill-appearing  Gastrointestinal: Soft, non-tender non-distended; Normal bowel sounds; No rebound or guarding  Extremities: Normal range of motion, No clubbing, cyanosis or edema  Neurological: Alert and oriented x3  Skin: Warm and dry. No obvious rash    LABS:                        7.8    8.03  )-----------( 185      ( 31 Jan 2023 06:20 )             22.7     01-31    143  |  112<H>  |  10.1  ----------------------------<  93  3.5   |  22.0  |  0.69    Ca    8.8      31 Jan 2023 06:20    TPro  4.5<L>  /  Alb  2.9<L>  /  TBili  0.6  /  DBili  x   /  AST  13  /  ALT  8   /  AlkPhos  51  01-31              CAPILLARY BLOOD GLUCOSE      POCT Blood Glucose.: 112 mg/dL (31 Jan 2023 12:39)  POCT Blood Glucose.: 91 mg/dL (31 Jan 2023 08:44)  POCT Blood Glucose.: 153 mg/dL (30 Jan 2023 17:23)        RADIOLOGY & ADDITIONAL TESTS:  Results Reviewed:   Imaging Personally Reviewed:  Electrocardiogram Personally Reviewed:

## 2023-01-31 NOTE — PROGRESS NOTE ADULT - TIME BILLING
S&E.  No further episodes of bleeding.  Colonoscopy with no e/o active bleeding.  Hgb 7.8 from 8.2.  Receiving an additional 1 unit.  Bridget full liquid diet.  AFVSS  NAD  soft, NTND  Labs reviewed  A/P - Pan diverticulosis without e/o active bleed on angio and colonoscopy  Slow downward drift of hgb without blood per rectum. Likely equilibrating.  Advance diet as tolerated.  No apparent acute CRS intervention required.  Please reconsult as needed.
S&E  No complaints.  Tolerated bowel prep with passage of brown stools.   Hgb dropped to 7 this AM from 8. Receiving another 1 unit.  AFVSS  NAD  soft, NTND  Labs reviewed  A/P - Hepatic flexure diverticulosis  For colonoscopy today.  Unclear whether she will ultimately need surgery as majority of diverticular bleeds resolve on own.  Advised patient.  Will cont to follow.

## 2023-01-31 NOTE — PROGRESS NOTE ADULT - SUBJECTIVE AND OBJECTIVE BOX
INTERVAL HPI/OVERNIGHT EVENTS/SUBJECTIVE:  Pt seen and examined. had colonoscopy today,noactive bleeding. no additional bloody bm's. HD normal.     ICU Vital Signs Last 24 Hrs  T(C): 37.1 (31 Jan 2023 00:01), Max: 37.1 (31 Jan 2023 00:01)  T(F): 98.7 (31 Jan 2023 00:01), Max: 98.7 (31 Jan 2023 00:01)  HR: 73 (31 Jan 2023 00:00) (57 - 78)  BP: 117/52 (31 Jan 2023 00:00) (86/76 - 134/79)  BP(mean): 72 (31 Jan 2023 00:00) (64 - 96)  ABP: --  ABP(mean): --  RR: 22 (31 Jan 2023 00:00) (16 - 22)  SpO2: 95% (31 Jan 2023 00:00) (95% - 100%)    O2 Parameters below as of 31 Jan 2023 00:00  Patient On (Oxygen Delivery Method): room air    MEDICATIONS  (STANDING):  allopurinol 100 milliGRAM(s) Oral daily  amoxicillin  875 milliGRAM(s)/clavulanate 1 Tablet(s) Oral two times a day  atorvastatin 40 milliGRAM(s) Oral at bedtime  balsalazide 2250 milliGRAM(s) Oral <User Schedule>  bisacodyl 5 milliGRAM(s) Oral at bedtime  chlorhexidine 2% Cloths 1 Application(s) Topical <User Schedule>  cholecalciferol 2000 Unit(s) Oral daily  clotrimazole/betamethasone Cream 1 Application(s) Topical two times a day  cyanocobalamin 1000 MICROGram(s) Oral daily  dextrose 5%. 1000 milliLiter(s) (50 mL/Hr) IV Continuous <Continuous>  dextrose 5%. 1000 milliLiter(s) (100 mL/Hr) IV Continuous <Continuous>  dextrose 50% Injectable 25 Gram(s) IV Push once  dextrose 50% Injectable 12.5 Gram(s) IV Push once  dextrose 50% Injectable 25 Gram(s) IV Push once  fluconAZOLE   Tablet 100 milliGRAM(s) Oral daily  glucagon  Injectable 1 milliGRAM(s) IntraMuscular once  insulin lispro (ADMELOG) corrective regimen sliding scale   SubCutaneous three times a day before meals  levothyroxine 125 MICROGram(s) Oral daily  montelukast 10 milliGRAM(s) Oral daily  pantoprazole  Injectable 40 milliGRAM(s) IV Push two times a day    MEDICATIONS  (PRN):  albuterol    90 MICROgram(s) HFA Inhaler 2 Puff(s) Inhalation every 6 hours PRN Shortness of Breath and/or Wheezing  dextrose Oral Gel 15 Gram(s) Oral once PRN Blood Glucose LESS THAN 70 milliGRAM(s)/deciliter  ondansetron Injectable 4 milliGRAM(s) IV Push every 4 hours PRN Nausea and/or Vomiting  oxycodone    5 mG/acetaminophen 325 mG 2 Tablet(s) Oral every 6 hours PRN mod to severe pain      MISC:     PHYSICAL EXAM:  General: NAD, Lying in bed comfortably  Neuro: A&Ox3  Cardio: RRR  Resp: Respirations even, unlabored on room air  GI/Abd: Soft, NT/ND  Vascular: All 4 extremities warm and well perfused.   Musculoskeletal: All 4 extremities moving spontaneously.    LABS:  CBC Full  -  ( 30 Jan 2023 16:20 )  WBC Count : 10.01 K/uL  RBC Count : 2.62 M/uL  Hemoglobin : 8.2 g/dL  Hematocrit : 24.7 %  Platelet Count - Automated : 185 K/uL  Mean Cell Volume : 94.3 fl  Mean Cell Hemoglobin : 31.3 pg  Mean Cell Hemoglobin Concentration : 33.2 gm/dL  Auto Neutrophil # : x  Auto Lymphocyte # : x  Auto Monocyte # : x  Auto Eosinophil # : x  Auto Basophil # : x  Auto Neutrophil % : x  Auto Lymphocyte % : x  Auto Monocyte % : x  Auto Eosinophil % : x  Auto Basophil % : x    01-30    144  |  113<H>  |  14.1  ----------------------------<  103<H>  3.5   |  23.0  |  0.66    Ca    8.7      30 Jan 2023 05:52    TPro  4.6<L>  /  Alb  2.8<L>  /  TBili  0.4  /  DBili  x   /  AST  13  /  ALT  9   /  AlkPhos  42  01-30    LIVER FUNCTIONS - ( 30 Jan 2023 05:52 )  Alb: 2.8 g/dL / Pro: 4.6 g/dL / ALK PHOS: 42 U/L / ALT: 9 U/L / AST: 13 U/L / GGT: x           ASSESSMENT/PLAN:  77yFemale PMHX of of Asthma, HTN, HLD, hypothyroid, DM, UC , diverticulitis admitted to medicine of LGIB, colonoscopy 1/30 , showed Severe diverticulosis, No active bleeding. Grade I internal hemorrhoids. no additional bloody bm's     PLAN:    - trend H/H   - monitor HD   - transfuse per parameter  - FLD, IVF  - pain control  - DVT ppx (SCDs)  - OOB/ambulate  - strict I/Os  -care per primary team

## 2023-02-01 LAB
ALBUMIN SERPL ELPH-MCNC: 2.9 G/DL — LOW (ref 3.3–5.2)
ALP SERPL-CCNC: 56 U/L — SIGNIFICANT CHANGE UP (ref 40–120)
ALT FLD-CCNC: 9 U/L — SIGNIFICANT CHANGE UP
ANION GAP SERPL CALC-SCNC: 7 MMOL/L — SIGNIFICANT CHANGE UP (ref 5–17)
AST SERPL-CCNC: 12 U/L — SIGNIFICANT CHANGE UP
BILIRUB SERPL-MCNC: 0.6 MG/DL — SIGNIFICANT CHANGE UP (ref 0.4–2)
BUN SERPL-MCNC: 8.7 MG/DL — SIGNIFICANT CHANGE UP (ref 8–20)
CALCIUM SERPL-MCNC: 8.4 MG/DL — SIGNIFICANT CHANGE UP (ref 8.4–10.5)
CHLORIDE SERPL-SCNC: 112 MMOL/L — HIGH (ref 96–108)
CO2 SERPL-SCNC: 25 MMOL/L — SIGNIFICANT CHANGE UP (ref 22–29)
CREAT SERPL-MCNC: 0.68 MG/DL — SIGNIFICANT CHANGE UP (ref 0.5–1.3)
EGFR: 90 ML/MIN/1.73M2 — SIGNIFICANT CHANGE UP
GLUCOSE BLDC GLUCOMTR-MCNC: 116 MG/DL — HIGH (ref 70–99)
GLUCOSE BLDC GLUCOMTR-MCNC: 120 MG/DL — HIGH (ref 70–99)
GLUCOSE BLDC GLUCOMTR-MCNC: 140 MG/DL — HIGH (ref 70–99)
GLUCOSE BLDC GLUCOMTR-MCNC: 99 MG/DL — SIGNIFICANT CHANGE UP (ref 70–99)
GLUCOSE SERPL-MCNC: 108 MG/DL — HIGH (ref 70–99)
HCT VFR BLD CALC: 27.2 % — LOW (ref 34.5–45)
HGB BLD-MCNC: 9 G/DL — LOW (ref 11.5–15.5)
MCHC RBC-ENTMCNC: 31.4 PG — SIGNIFICANT CHANGE UP (ref 27–34)
MCHC RBC-ENTMCNC: 33.1 GM/DL — SIGNIFICANT CHANGE UP (ref 32–36)
MCV RBC AUTO: 94.8 FL — SIGNIFICANT CHANGE UP (ref 80–100)
PLATELET # BLD AUTO: 200 K/UL — SIGNIFICANT CHANGE UP (ref 150–400)
POTASSIUM SERPL-MCNC: 3.7 MMOL/L — SIGNIFICANT CHANGE UP (ref 3.5–5.3)
POTASSIUM SERPL-SCNC: 3.7 MMOL/L — SIGNIFICANT CHANGE UP (ref 3.5–5.3)
PROT SERPL-MCNC: 4.7 G/DL — LOW (ref 6.6–8.7)
RBC # BLD: 2.87 M/UL — LOW (ref 3.8–5.2)
RBC # FLD: 15.2 % — HIGH (ref 10.3–14.5)
SODIUM SERPL-SCNC: 144 MMOL/L — SIGNIFICANT CHANGE UP (ref 135–145)
WBC # BLD: 7.66 K/UL — SIGNIFICANT CHANGE UP (ref 3.8–10.5)
WBC # FLD AUTO: 7.66 K/UL — SIGNIFICANT CHANGE UP (ref 3.8–10.5)

## 2023-02-01 PROCEDURE — 99233 SBSQ HOSP IP/OBS HIGH 50: CPT

## 2023-02-01 RX ADMIN — PANTOPRAZOLE SODIUM 40 MILLIGRAM(S): 20 TABLET, DELAYED RELEASE ORAL at 06:52

## 2023-02-01 RX ADMIN — Medication 5 MILLIGRAM(S): at 21:47

## 2023-02-01 RX ADMIN — Medication 2000 UNIT(S): at 12:40

## 2023-02-01 RX ADMIN — CLOTRIMAZOLE AND BETAMETHASONE DIPROPIONATE 1 APPLICATION(S): 10; .5 CREAM TOPICAL at 17:07

## 2023-02-01 RX ADMIN — CLOTRIMAZOLE AND BETAMETHASONE DIPROPIONATE 1 APPLICATION(S): 10; .5 CREAM TOPICAL at 06:53

## 2023-02-01 RX ADMIN — Medication 600 MILLIGRAM(S): at 17:07

## 2023-02-01 RX ADMIN — Medication 125 MICROGRAM(S): at 06:51

## 2023-02-01 RX ADMIN — ATORVASTATIN CALCIUM 40 MILLIGRAM(S): 80 TABLET, FILM COATED ORAL at 21:48

## 2023-02-01 RX ADMIN — CHLORHEXIDINE GLUCONATE 1 APPLICATION(S): 213 SOLUTION TOPICAL at 06:53

## 2023-02-01 RX ADMIN — Medication 100 MILLIGRAM(S): at 12:40

## 2023-02-01 RX ADMIN — PANTOPRAZOLE SODIUM 40 MILLIGRAM(S): 20 TABLET, DELAYED RELEASE ORAL at 17:07

## 2023-02-01 RX ADMIN — Medication 1 TABLET(S): at 06:51

## 2023-02-01 RX ADMIN — PREGABALIN 1000 MICROGRAM(S): 225 CAPSULE ORAL at 12:40

## 2023-02-01 RX ADMIN — BALSALAZIDE DISODIUM 2250 MILLIGRAM(S): 750 CAPSULE ORAL at 21:48

## 2023-02-01 NOTE — PHYSICAL THERAPY INITIAL EVALUATION ADULT - GENERAL OBSERVATIONS, REHAB EVAL
Pt. greeted sitting in chair at bedside, monitor, agreeable to PT
Pt. greeted resting in bed +IV, monitor, agreeable to PT

## 2023-02-01 NOTE — PHYSICAL THERAPY INITIAL EVALUATION ADULT - MANUAL MUSCLE TESTING RESULTS, REHAB EVAL
4/5 throughout/no strength deficits were identified
except tamica LE: grossly 4-/5 throughout d/no strength deficits were identified

## 2023-02-01 NOTE — PHYSICAL THERAPY INITIAL EVALUATION ADULT - GAIT PATTERN USED, PT EVAL
contact assist for safety due to fatigue, incidental standing rest breaks due to fatigue, decreased tamica step length

## 2023-02-01 NOTE — PHYSICAL THERAPY INITIAL EVALUATION ADULT - PERTINENT HX OF CURRENT PROBLEM, REHAB EVAL
76 y/o F with PMHx of HTN, HLD, hypothyroidism, DM type 2, and asthma who initially presented to [facility] [place] complaining of large episode of hematochezia. Upon arrival to ER, had two additional episodes. Pt with hgb 13.9 on presentation. Overnight, had an acute drop in hgb (repeat 8.4). Pt received a total of 6u PRBCs, 2u platelets, 2u FFP, and cryo. Underwent EGD on 1/25 which was revealing of clean based gastric and duodenal ulcers, unlikely to explain pt's severe GI bleeding. Pt was transferred to [facility] for CTA abd/pelvis and possible IR intervention in the setting of suspected lower GI bleed. Pt remained intubated s/p EGD. CTA abd/pelvis revealed acute uncomplicated sigmoid diverticulitis, and no site of active GI hemorrhage was identified. extubated in ICU and monitored w/o further gi hemorrhaged. stable for transfer to medicine. s/p 1/28/23 right CFA closure

## 2023-02-01 NOTE — PHYSICAL THERAPY INITIAL EVALUATION ADULT - ADDITIONAL COMMENTS
Pt. lives alone in a house with 2 JUSTIN with rail and a flight inside. Pt. was modified independent with a WBQC prior to admission and also owns a RW.
Pt. lives alone in a house with 2 JUSTIN with rail and a flight inside. Pt. was modified independent with a WBQC prior to admission and also owns a RW.

## 2023-02-01 NOTE — PROGRESS NOTE ADULT - SUBJECTIVE AND OBJECTIVE BOX
[facility] Division of Hospital Medicine    Chief Complaint: diverticular bleed       SUBJECTIVE / OVERNIGHT EVENTS: No acute events overnight. HD stable.     Patient denies chest pain, SOB, abd pain, N/V, fever, chills, dysuria or any other complaints. All remainder ROS negative.     MEDICATIONS  (STANDING):  allopurinol 100 milliGRAM(s) Oral daily  atorvastatin 40 milliGRAM(s) Oral at bedtime  balsalazide 2250 milliGRAM(s) Oral <User Schedule>  bisacodyl 5 milliGRAM(s) Oral at bedtime  chlorhexidine 2% Cloths 1 Application(s) Topical <User Schedule>  cholecalciferol 2000 Unit(s) Oral daily  clotrimazole/betamethasone Cream 1 Application(s) Topical two times a day  cyanocobalamin 1000 MICROGram(s) Oral daily  dextrose 5%. 1000 milliLiter(s) (100 mL/Hr) IV Continuous <Continuous>  dextrose 5%. 1000 milliLiter(s) (50 mL/Hr) IV Continuous <Continuous>  dextrose 50% Injectable 25 Gram(s) IV Push once  dextrose 50% Injectable 12.5 Gram(s) IV Push once  dextrose 50% Injectable 25 Gram(s) IV Push once  glucagon  Injectable 1 milliGRAM(s) IntraMuscular once  guaiFENesin  milliGRAM(s) Oral every 12 hours  insulin lispro (ADMELOG) corrective regimen sliding scale   SubCutaneous three times a day before meals  levothyroxine 125 MICROGram(s) Oral daily  montelukast 10 milliGRAM(s) Oral daily  pantoprazole  Injectable 40 milliGRAM(s) IV Push two times a day    MEDICATIONS  (PRN):  albuterol    90 MICROgram(s) HFA Inhaler 2 Puff(s) Inhalation every 6 hours PRN Shortness of Breath and/or Wheezing  dextrose Oral Gel 15 Gram(s) Oral once PRN Blood Glucose LESS THAN 70 milliGRAM(s)/deciliter  ondansetron Injectable 4 milliGRAM(s) IV Push every 4 hours PRN Nausea and/or Vomiting  oxycodone    5 mG/acetaminophen 325 mG 2 Tablet(s) Oral every 6 hours PRN mod to severe pain        I&O's Summary    31 Jan 2023 07:01  -  01 Feb 2023 07:00  --------------------------------------------------------  IN: 1170 mL / OUT: 0 mL / NET: 1170 mL        PHYSICAL EXAM:  Vital Signs Last 24 Hrs  T(C): 36.7 (01 Feb 2023 08:03), Max: 36.8 (01 Feb 2023 00:00)  T(F): 98.1 (01 Feb 2023 08:03), Max: 98.2 (01 Feb 2023 00:00)  HR: 69 (01 Feb 2023 07:27) (62 - 71)  BP: 147/63 (01 Feb 2023 07:27) (116/62 - 147/63)  BP(mean): 86 (01 Feb 2023 07:27) (72 - 96)  RR: 17 (01 Feb 2023 07:27) (17 - 21)  SpO2: 98% (01 Feb 2023 07:27) (94% - 100%)    Parameters below as of 01 Feb 2023 07:27  Patient On (Oxygen Delivery Method): room air          General:  no acute distress, ill-appearing  Gastrointestinal: Soft, non-tender non-distended; Normal bowel sounds; No rebound or guarding  Extremities: Normal range of motion, No clubbing, cyanosis or edema  Neurological: Alert and oriented x3  Skin: Warm and dry. No obvious rash    LABS:                        9.0    7.66  )-----------( 200      ( 01 Feb 2023 06:35 )             27.2     02-01    144  |  112<H>  |  8.7  ----------------------------<  108<H>  3.7   |  25.0  |  0.68    Ca    8.4      01 Feb 2023 06:35    TPro  4.7<L>  /  Alb  2.9<L>  /  TBili  0.6  /  DBili  x   /  AST  12  /  ALT  9   /  AlkPhos  56  02-01              CAPILLARY BLOOD GLUCOSE      POCT Blood Glucose.: 99 mg/dL (01 Feb 2023 08:33)  POCT Blood Glucose.: 110 mg/dL (31 Jan 2023 17:31)  POCT Blood Glucose.: 112 mg/dL (31 Jan 2023 12:39)        RADIOLOGY & ADDITIONAL TESTS:  Results Reviewed:   Imaging Personally Reviewed:  Electrocardiogram Personally Reviewed:

## 2023-02-01 NOTE — CHART NOTE - NSCHARTNOTEFT_GEN_A_CORE
Source: Patient [ ]  Family [ ]   other [ ]    76 y/o F with PMHx of HTN, HLD, hypothyroidism, DM type 2, and asthma who initially presented to [facility] on 1/24 complaining of large episode of hematochezia. Upon arrival to ER, had two additional episodes. Pt with hgb 13.9 on presentation. Overnight, had an acute drop in hgb (repeat 8.4). Pt received a total of 6u PRBCs, 2u platelets, 2u FFP, and cryo. Underwent EGD on 1/25 which was revealing of clean based gastric and duodenal ulcers, unlikely to explain pt's severe GI bleeding. Pt was transferred to [facility] for CTA abd/pelvis and possible IR intervention in the setting of suspected lower GI bleed. Pt remained intubated s/p EGD. CTA abd/pelvis revealed acute uncomplicated sigmoid diverticulitis, and no site of active GI hemorrhage was identified. extubated in ICU and monitored w/o further gi hemorrhaged. stable for transfer to medicine.    Current Diet:   Diet, Regular (01-31-23 @ 12:57)    Patient reports [ ] nausea  [ ] vomiting [ ] diarrhea [ ] constipation  [ ]chewing problems [ ] swallowing issues  [ ] other:     PO intake:  < 50% [ ]   50-75%  [ ]   %  [ ]  other :    Source for PO intake [ ] Patient [ ] family [ ] chart [ ] staff [ ] other        Current Weight:   1/28 46.9 kg  1/26 113.3 kg  1/25 92.7 kg    % Weight Change   ? accuracy   Edema : 1+ legs    Pertinent Medications: MEDICATIONS  (STANDING):  allopurinol 100 milliGRAM(s) Oral daily  atorvastatin 40 milliGRAM(s) Oral at bedtime  balsalazide 2250 milliGRAM(s) Oral <User Schedule>  bisacodyl 5 milliGRAM(s) Oral at bedtime  chlorhexidine 2% Cloths 1 Application(s) Topical <User Schedule>  cholecalciferol 2000 Unit(s) Oral daily  clotrimazole/betamethasone Cream 1 Application(s) Topical two times a day  cyanocobalamin 1000 MICROGram(s) Oral daily  dextrose 5%. 1000 milliLiter(s) (100 mL/Hr) IV Continuous <Continuous>  dextrose 5%. 1000 milliLiter(s) (50 mL/Hr) IV Continuous <Continuous>  dextrose 50% Injectable 25 Gram(s) IV Push once  dextrose 50% Injectable 12.5 Gram(s) IV Push once  dextrose 50% Injectable 25 Gram(s) IV Push once  glucagon  Injectable 1 milliGRAM(s) IntraMuscular once  guaiFENesin  milliGRAM(s) Oral every 12 hours  insulin lispro (ADMELOG) corrective regimen sliding scale   SubCutaneous three times a day before meals  levothyroxine 125 MICROGram(s) Oral daily  montelukast 10 milliGRAM(s) Oral daily  pantoprazole  Injectable 40 milliGRAM(s) IV Push two times a day    MEDICATIONS  (PRN):  albuterol    90 MICROgram(s) HFA Inhaler 2 Puff(s) Inhalation every 6 hours PRN Shortness of Breath and/or Wheezing  dextrose Oral Gel 15 Gram(s) Oral once PRN Blood Glucose LESS THAN 70 milliGRAM(s)/deciliter  ondansetron Injectable 4 milliGRAM(s) IV Push every 4 hours PRN Nausea and/or Vomiting  oxycodone    5 mG/acetaminophen 325 mG 2 Tablet(s) Oral every 6 hours PRN mod to severe pain    Pertinent Labs: CBC Full  -  ( 01 Feb 2023 06:35 )  WBC Count : 7.66 K/uL  RBC Count : 2.87 M/uL  Hemoglobin : 9.0 g/dL  Hematocrit : 27.2 %  Platelet Count - Automated : 200 K/uL  Mean Cell Volume : 94.8 fl  Mean Cell Hemoglobin : 31.4 pg  Mean Cell Hemoglobin Concentration : 33.1 gm/dL  Auto Neutrophil # : x  Auto Lymphocyte # : x  Auto Monocyte # : x  Auto Eosinophil # : x  Auto Basophil # : x  Auto Neutrophil % : x  Auto Lymphocyte % : x  Auto Monocyte % : x  Auto Eosinophil % : x  Auto Basophil % : x  02-01 Na144 mmol/L Glu 108 mg/dL<H> K+ 3.7 mmol/L Cr  0.68 mg/dL BUN 8.7 mg/dL Phos n/a   Alb 2.9 g/dL<L> PAB n/a               Skin:  R groin sx wound    Nutrition focused physical exam conducted - found signs of malnutrition [ ]absent [x ]present    Subcutaneous fat loss: [x ] Orbital fat pads region, [ ]Buccal fat region, [ ]Triceps region,  [ ]Ribs region    Muscle wasting: [x ]Temples region, [x ]Clavicle region, [ ]Shoulder region, [ ]Scapula region, [ ]Interosseous region,  [ ]thigh region, [ ]Calf region    Estimated Needs:   [ ] no change since previous assessment  [ ] recalculated:     Current Nutrition Diagnosis: Pt presents at risk secondary to  Malnutrition, moderate acute, related to inability to meet estimated nutrition needs in setting of dx GIB, acute diverticulitis, clear liquid diet  as evidenced by meeting <75% estimated nutrition needs x ~7 days, moderate muscle/fat loss. Pt reports improved PO intake and diet tolerance. HBV protein foods encouraged.       Recommendations:   1) Continue diet  2) Daily weight  3) RX MVI    Monitoring and Evaluation:   [ ] PO intake [ ] Tolerance to diet prescription [X] Weights  [X] Follow up per protocol [X] Labs:

## 2023-02-01 NOTE — PROGRESS NOTE ADULT - ASSESSMENT
76 y/o F with PMHx of HTN, HLD, hypothyroidism, DM type 2, and asthma who initially presented to [facility] on 1/24 complaining of large episode of hematochezia. Upon arrival to ER, had two additional episodes. Pt with hgb 13.9 on presentation. Overnight, had an acute drop in hgb (repeat 8.4). Pt received a total of 6u PRBCs, 2u platelets, 2u FFP, and cryo. Underwent EGD on 1/25 which was revealing of clean based gastric and duodenal ulcers, unlikely to explain pt's severe GI bleeding. Pt was transferred to [facility] for CTA abd/pelvis and possible IR intervention in the setting of suspected lower GI bleed. Pt remained intubated s/p EGD. CTA abd/pelvis revealed acute uncomplicated sigmoid diverticulitis, and no site of active GI hemorrhage was identified. extubated in ICU and monitored w/o further gi hemorrhaged. stable for transfer to medicine.    acute blood loss anemia: likely diverticular    PPI BID  - CTA A/P w/ active gastrointestinal bleeding is identified in the right colon in the region of a right-sided colonic diverticulum   - S/p IR angiogram w/ no active extravasation.  - Appreciate GI recs- colonoscopy notable for severe diverticulosis without active bleed  - Appreciate surgery recs  - H/H stable to keep Hg> 8  - will need outpatient follow up with Dr. Whitaker for further management    Afib w/ RVR- new  - S/p IV lopressor, x 1. S/p amiodarone 150 mg  - CHADVASc- 5, but elevated risk of bleeding  - TTE reviewed  - c/w tele monitor      HTN  - will hold lasix and lisinopril    Peptic ulcer disease:  - c/w PPI BID    acute diverticulitis:  - completed augmentin    UC: restart home balsalazide 750 mg oral capsule 3 cap(s) orally once a day   family brought it in     hypothyroid: synthroid.    Dm2: a1c 5.2.     Diet- regular diet  Update patient's daughter, Xavier ([phone]) on 2/1    Dispo- PT eval pending

## 2023-02-02 LAB
ALBUMIN SERPL ELPH-MCNC: 3 G/DL — LOW (ref 3.3–5.2)
ALP SERPL-CCNC: 64 U/L — SIGNIFICANT CHANGE UP (ref 40–120)
ALT FLD-CCNC: 9 U/L — SIGNIFICANT CHANGE UP
ANION GAP SERPL CALC-SCNC: 9 MMOL/L — SIGNIFICANT CHANGE UP (ref 5–17)
AST SERPL-CCNC: 12 U/L — SIGNIFICANT CHANGE UP
BILIRUB SERPL-MCNC: 0.5 MG/DL — SIGNIFICANT CHANGE UP (ref 0.4–2)
BUN SERPL-MCNC: 9.5 MG/DL — SIGNIFICANT CHANGE UP (ref 8–20)
CALCIUM SERPL-MCNC: 8.6 MG/DL — SIGNIFICANT CHANGE UP (ref 8.4–10.5)
CHLORIDE SERPL-SCNC: 110 MMOL/L — HIGH (ref 96–108)
CO2 SERPL-SCNC: 24 MMOL/L — SIGNIFICANT CHANGE UP (ref 22–29)
CREAT SERPL-MCNC: 0.57 MG/DL — SIGNIFICANT CHANGE UP (ref 0.5–1.3)
EGFR: 94 ML/MIN/1.73M2 — SIGNIFICANT CHANGE UP
GLUCOSE BLDC GLUCOMTR-MCNC: 109 MG/DL — HIGH (ref 70–99)
GLUCOSE BLDC GLUCOMTR-MCNC: 110 MG/DL — HIGH (ref 70–99)
GLUCOSE BLDC GLUCOMTR-MCNC: 140 MG/DL — HIGH (ref 70–99)
GLUCOSE BLDC GLUCOMTR-MCNC: 99 MG/DL — SIGNIFICANT CHANGE UP (ref 70–99)
GLUCOSE SERPL-MCNC: 107 MG/DL — HIGH (ref 70–99)
HCT VFR BLD CALC: 28.4 % — LOW (ref 34.5–45)
HGB BLD-MCNC: 9.3 G/DL — LOW (ref 11.5–15.5)
MCHC RBC-ENTMCNC: 31.5 PG — SIGNIFICANT CHANGE UP (ref 27–34)
MCHC RBC-ENTMCNC: 32.7 GM/DL — SIGNIFICANT CHANGE UP (ref 32–36)
MCV RBC AUTO: 96.3 FL — SIGNIFICANT CHANGE UP (ref 80–100)
PLATELET # BLD AUTO: 225 K/UL — SIGNIFICANT CHANGE UP (ref 150–400)
POTASSIUM SERPL-MCNC: 3.7 MMOL/L — SIGNIFICANT CHANGE UP (ref 3.5–5.3)
POTASSIUM SERPL-SCNC: 3.7 MMOL/L — SIGNIFICANT CHANGE UP (ref 3.5–5.3)
PROT SERPL-MCNC: 4.9 G/DL — LOW (ref 6.6–8.7)
RBC # BLD: 2.95 M/UL — LOW (ref 3.8–5.2)
RBC # FLD: 16.5 % — HIGH (ref 10.3–14.5)
SODIUM SERPL-SCNC: 142 MMOL/L — SIGNIFICANT CHANGE UP (ref 135–145)
SURGICAL PATHOLOGY STUDY: SIGNIFICANT CHANGE UP
WBC # BLD: 8.18 K/UL — SIGNIFICANT CHANGE UP (ref 3.8–10.5)
WBC # FLD AUTO: 8.18 K/UL — SIGNIFICANT CHANGE UP (ref 3.8–10.5)

## 2023-02-02 PROCEDURE — 99233 SBSQ HOSP IP/OBS HIGH 50: CPT

## 2023-02-02 RX ORDER — HYDRALAZINE HCL 50 MG
10 TABLET ORAL THREE TIMES A DAY
Refills: 0 | Status: DISCONTINUED | OUTPATIENT
Start: 2023-02-02 | End: 2023-02-04

## 2023-02-02 RX ADMIN — ALBUTEROL 2 PUFF(S): 90 AEROSOL, METERED ORAL at 05:11

## 2023-02-02 RX ADMIN — PANTOPRAZOLE SODIUM 40 MILLIGRAM(S): 20 TABLET, DELAYED RELEASE ORAL at 18:31

## 2023-02-02 RX ADMIN — Medication 125 MICROGRAM(S): at 05:11

## 2023-02-02 RX ADMIN — Medication 100 MILLIGRAM(S): at 10:19

## 2023-02-02 RX ADMIN — Medication 600 MILLIGRAM(S): at 05:11

## 2023-02-02 RX ADMIN — MONTELUKAST 10 MILLIGRAM(S): 4 TABLET, CHEWABLE ORAL at 10:19

## 2023-02-02 RX ADMIN — ATORVASTATIN CALCIUM 40 MILLIGRAM(S): 80 TABLET, FILM COATED ORAL at 22:49

## 2023-02-02 RX ADMIN — Medication 10 MILLIGRAM(S): at 22:53

## 2023-02-02 RX ADMIN — PANTOPRAZOLE SODIUM 40 MILLIGRAM(S): 20 TABLET, DELAYED RELEASE ORAL at 05:11

## 2023-02-02 RX ADMIN — Medication 10 MILLIGRAM(S): at 10:20

## 2023-02-02 RX ADMIN — Medication 2000 UNIT(S): at 10:19

## 2023-02-02 RX ADMIN — Medication 600 MILLIGRAM(S): at 18:32

## 2023-02-02 RX ADMIN — CLOTRIMAZOLE AND BETAMETHASONE DIPROPIONATE 1 APPLICATION(S): 10; .5 CREAM TOPICAL at 05:10

## 2023-02-02 RX ADMIN — PREGABALIN 1000 MICROGRAM(S): 225 CAPSULE ORAL at 18:31

## 2023-02-02 RX ADMIN — CHLORHEXIDINE GLUCONATE 1 APPLICATION(S): 213 SOLUTION TOPICAL at 05:15

## 2023-02-02 RX ADMIN — CLOTRIMAZOLE AND BETAMETHASONE DIPROPIONATE 1 APPLICATION(S): 10; .5 CREAM TOPICAL at 18:32

## 2023-02-02 NOTE — PROGRESS NOTE ADULT - ASSESSMENT
78 y/o F with PMHx of HTN, HLD, hypothyroidism, DM type 2, and asthma who initially presented to [facility] on 1/24 complaining of large episode of hematochezia. Upon arrival to ER, had two additional episodes. Pt with hgb 13.9 on presentation. Overnight, had an acute drop in hgb (repeat 8.4). Pt received a total of 6u PRBCs, 2u platelets, 2u FFP, and cryo. Underwent EGD on 1/25 which was revealing of clean based gastric and duodenal ulcers, unlikely to explain pt's severe GI bleeding. Pt was transferred to [facility] for CTA abd/pelvis and possible IR intervention in the setting of suspected lower GI bleed. Pt remained intubated s/p EGD. CTA abd/pelvis revealed acute uncomplicated sigmoid diverticulitis, and no site of active GI hemorrhage was identified. extubated in ICU and monitored w/o further gi hemorrhaged. stable for transfer to medicine.    acute blood loss anemia: likely diverticular    PPI BID  - CTA A/P w/ active gastrointestinal bleeding is identified in the right colon in the region of a right-sided colonic diverticulum   - S/p IR angiogram w/ no active extravasation.  - Appreciate GI recs- colonoscopy notable for severe diverticulosis without active bleed  - Appreciate surgery recs  - H/H stable to keep Hg> 8  - will need outpatient follow up with Dr. Whitaker for further management    Afib w/ RVR- new  - S/p IV lopressor, x 1. S/p amiodarone 150 mg  - CHADVASc- 5, but elevated risk of bleeding  - TTE reviewed  - c/w tele monitor      HTN  - will hold lasix and lisinopril GIVEN prudencio  - started hydral 10 TID given elevated BP    Peptic ulcer disease:  - c/w PPI BID    acute diverticulitis:  - completed augmentin    UC: restart home balsalazide 750 mg oral capsule 3 cap(s) orally once a day   family brought it in     hypothyroid: synthroid.    Dm2: a1c 5.2.     Diet- regular diet  Update patient's daughter, Xavier ([phone]) on 2/2.     Dispo- PT eval pending [facility] (pending insurance auth and accepting facilities).

## 2023-02-02 NOTE — PROGRESS NOTE ADULT - SUBJECTIVE AND OBJECTIVE BOX
[facility] Division of Hospital Medicine    Chief Complaint: diverticular bleed        SUBJECTIVE / OVERNIGHT EVENTS: No acute events overnight. HD stable. Denies GI bleed overnight.     Patient denies chest pain, SOB, abd pain, N/V, fever, chills, dysuria or any other complaints. All remainder ROS negative.     MEDICATIONS  (STANDING):  allopurinol 100 milliGRAM(s) Oral daily  atorvastatin 40 milliGRAM(s) Oral at bedtime  balsalazide 2250 milliGRAM(s) Oral <User Schedule>  bisacodyl 5 milliGRAM(s) Oral at bedtime  chlorhexidine 2% Cloths 1 Application(s) Topical <User Schedule>  cholecalciferol 2000 Unit(s) Oral daily  clotrimazole/betamethasone Cream 1 Application(s) Topical two times a day  cyanocobalamin 1000 MICROGram(s) Oral daily  dextrose 5%. 1000 milliLiter(s) (100 mL/Hr) IV Continuous <Continuous>  dextrose 5%. 1000 milliLiter(s) (50 mL/Hr) IV Continuous <Continuous>  dextrose 50% Injectable 25 Gram(s) IV Push once  dextrose 50% Injectable 12.5 Gram(s) IV Push once  dextrose 50% Injectable 25 Gram(s) IV Push once  glucagon  Injectable 1 milliGRAM(s) IntraMuscular once  guaiFENesin  milliGRAM(s) Oral every 12 hours  hydrALAZINE 10 milliGRAM(s) Oral three times a day  insulin lispro (ADMELOG) corrective regimen sliding scale   SubCutaneous three times a day before meals  levothyroxine 125 MICROGram(s) Oral daily  montelukast 10 milliGRAM(s) Oral daily  pantoprazole  Injectable 40 milliGRAM(s) IV Push two times a day    MEDICATIONS  (PRN):  albuterol    90 MICROgram(s) HFA Inhaler 2 Puff(s) Inhalation every 6 hours PRN Shortness of Breath and/or Wheezing  dextrose Oral Gel 15 Gram(s) Oral once PRN Blood Glucose LESS THAN 70 milliGRAM(s)/deciliter  ondansetron Injectable 4 milliGRAM(s) IV Push every 4 hours PRN Nausea and/or Vomiting  oxycodone    5 mG/acetaminophen 325 mG 2 Tablet(s) Oral every 6 hours PRN mod to severe pain        I&O's Summary    01 Feb 2023 07:01  -  02 Feb 2023 07:00  --------------------------------------------------------  IN: 325 mL / OUT: 0 mL / NET: 325 mL        PHYSICAL EXAM:  Vital Signs Last 24 Hrs  T(C): 36.6 (02 Feb 2023 08:17), Max: 36.9 (02 Feb 2023 04:00)  T(F): 97.9 (02 Feb 2023 08:17), Max: 98.5 (02 Feb 2023 04:00)  HR: 72 (02 Feb 2023 08:00) (62 - 78)  BP: 167/78 (02 Feb 2023 08:00) (138/65 - 167/78)  BP(mean): 108 (02 Feb 2023 08:00) (83 - 108)  RR: 18 (02 Feb 2023 08:00) (17 - 23)  SpO2: 98% (02 Feb 2023 08:00) (94% - 100%)    Parameters below as of 02 Feb 2023 08:00  Patient On (Oxygen Delivery Method): room air      General:  no acute distress, ill-appearing  Gastrointestinal: Soft, non-tender non-distended; Normal bowel sounds; No rebound or guarding  Extremities: Normal range of motion, No clubbing, cyanosis or edema  Neurological: Alert and oriented x3  Skin: Warm and dry. No obvious rash      LABS:                        9.3    8.18  )-----------( 225      ( 02 Feb 2023 05:23 )             28.4     02-02    142  |  110<H>  |  9.5  ----------------------------<  107<H>  3.7   |  24.0  |  0.57    Ca    8.6      02 Feb 2023 05:23    TPro  4.9<L>  /  Alb  3.0<L>  /  TBili  0.5  /  DBili  x   /  AST  12  /  ALT  9   /  AlkPhos  64  02-02              CAPILLARY BLOOD GLUCOSE      POCT Blood Glucose.: 99 mg/dL (02 Feb 2023 08:41)  POCT Blood Glucose.: 120 mg/dL (01 Feb 2023 21:46)  POCT Blood Glucose.: 140 mg/dL (01 Feb 2023 17:06)  POCT Blood Glucose.: 116 mg/dL (01 Feb 2023 12:39)        RADIOLOGY & ADDITIONAL TESTS:  Results Reviewed:   Imaging Personally Reviewed:  Electrocardiogram Personally Reviewed:

## 2023-02-03 LAB
ALBUMIN SERPL ELPH-MCNC: 3.2 G/DL — LOW (ref 3.3–5.2)
ALP SERPL-CCNC: 64 U/L — SIGNIFICANT CHANGE UP (ref 40–120)
ALT FLD-CCNC: 9 U/L — SIGNIFICANT CHANGE UP
ANION GAP SERPL CALC-SCNC: 7 MMOL/L — SIGNIFICANT CHANGE UP (ref 5–17)
AST SERPL-CCNC: 15 U/L — SIGNIFICANT CHANGE UP
BILIRUB SERPL-MCNC: 0.6 MG/DL — SIGNIFICANT CHANGE UP (ref 0.4–2)
BUN SERPL-MCNC: 10.8 MG/DL — SIGNIFICANT CHANGE UP (ref 8–20)
CALCIUM SERPL-MCNC: 9.3 MG/DL — SIGNIFICANT CHANGE UP (ref 8.4–10.5)
CHLORIDE SERPL-SCNC: 108 MMOL/L — SIGNIFICANT CHANGE UP (ref 96–108)
CO2 SERPL-SCNC: 27 MMOL/L — SIGNIFICANT CHANGE UP (ref 22–29)
CREAT SERPL-MCNC: 0.5 MG/DL — SIGNIFICANT CHANGE UP (ref 0.5–1.3)
EGFR: 97 ML/MIN/1.73M2 — SIGNIFICANT CHANGE UP
GLUCOSE BLDC GLUCOMTR-MCNC: 113 MG/DL — HIGH (ref 70–99)
GLUCOSE BLDC GLUCOMTR-MCNC: 117 MG/DL — HIGH (ref 70–99)
GLUCOSE BLDC GLUCOMTR-MCNC: 122 MG/DL — HIGH (ref 70–99)
GLUCOSE SERPL-MCNC: 111 MG/DL — HIGH (ref 70–99)
HCT VFR BLD CALC: 28.7 % — LOW (ref 34.5–45)
HGB BLD-MCNC: 9.4 G/DL — LOW (ref 11.5–15.5)
MCHC RBC-ENTMCNC: 31.6 PG — SIGNIFICANT CHANGE UP (ref 27–34)
MCHC RBC-ENTMCNC: 32.8 GM/DL — SIGNIFICANT CHANGE UP (ref 32–36)
MCV RBC AUTO: 96.6 FL — SIGNIFICANT CHANGE UP (ref 80–100)
PLATELET # BLD AUTO: 255 K/UL — SIGNIFICANT CHANGE UP (ref 150–400)
POTASSIUM SERPL-MCNC: 3.9 MMOL/L — SIGNIFICANT CHANGE UP (ref 3.5–5.3)
POTASSIUM SERPL-SCNC: 3.9 MMOL/L — SIGNIFICANT CHANGE UP (ref 3.5–5.3)
PROT SERPL-MCNC: 5.2 G/DL — LOW (ref 6.6–8.7)
RBC # BLD: 2.97 M/UL — LOW (ref 3.8–5.2)
RBC # FLD: 15.4 % — HIGH (ref 10.3–14.5)
SODIUM SERPL-SCNC: 142 MMOL/L — SIGNIFICANT CHANGE UP (ref 135–145)
WBC # BLD: 8.63 K/UL — SIGNIFICANT CHANGE UP (ref 3.8–10.5)
WBC # FLD AUTO: 8.63 K/UL — SIGNIFICANT CHANGE UP (ref 3.8–10.5)

## 2023-02-03 PROCEDURE — 99232 SBSQ HOSP IP/OBS MODERATE 35: CPT

## 2023-02-03 RX ORDER — ACETAMINOPHEN 500 MG
650 TABLET ORAL EVERY 6 HOURS
Refills: 0 | Status: DISCONTINUED | OUTPATIENT
Start: 2023-02-03 | End: 2023-02-06

## 2023-02-03 RX ADMIN — PANTOPRAZOLE SODIUM 40 MILLIGRAM(S): 20 TABLET, DELAYED RELEASE ORAL at 05:09

## 2023-02-03 RX ADMIN — Medication 10 MILLIGRAM(S): at 21:14

## 2023-02-03 RX ADMIN — Medication 10 MILLIGRAM(S): at 13:31

## 2023-02-03 RX ADMIN — Medication 2000 UNIT(S): at 11:28

## 2023-02-03 RX ADMIN — CHLORHEXIDINE GLUCONATE 1 APPLICATION(S): 213 SOLUTION TOPICAL at 05:06

## 2023-02-03 RX ADMIN — Medication 125 MICROGRAM(S): at 05:04

## 2023-02-03 RX ADMIN — Medication 100 MILLIGRAM(S): at 11:28

## 2023-02-03 RX ADMIN — ALBUTEROL 2 PUFF(S): 90 AEROSOL, METERED ORAL at 06:39

## 2023-02-03 RX ADMIN — ATORVASTATIN CALCIUM 40 MILLIGRAM(S): 80 TABLET, FILM COATED ORAL at 21:14

## 2023-02-03 RX ADMIN — MONTELUKAST 10 MILLIGRAM(S): 4 TABLET, CHEWABLE ORAL at 11:28

## 2023-02-03 RX ADMIN — Medication 600 MILLIGRAM(S): at 06:20

## 2023-02-03 RX ADMIN — CLOTRIMAZOLE AND BETAMETHASONE DIPROPIONATE 1 APPLICATION(S): 10; .5 CREAM TOPICAL at 18:27

## 2023-02-03 RX ADMIN — PREGABALIN 1000 MICROGRAM(S): 225 CAPSULE ORAL at 11:29

## 2023-02-03 RX ADMIN — Medication 600 MILLIGRAM(S): at 18:36

## 2023-02-03 RX ADMIN — Medication 5 MILLIGRAM(S): at 21:14

## 2023-02-03 RX ADMIN — Medication 650 MILLIGRAM(S): at 06:53

## 2023-02-03 RX ADMIN — CLOTRIMAZOLE AND BETAMETHASONE DIPROPIONATE 1 APPLICATION(S): 10; .5 CREAM TOPICAL at 05:05

## 2023-02-03 RX ADMIN — Medication 10 MILLIGRAM(S): at 06:28

## 2023-02-03 NOTE — PROGRESS NOTE ADULT - SUBJECTIVE AND OBJECTIVE BOX
CC: GIB (02 Feb 2023 12:21)    INTERVAL HPI/OVERNIGHT EVENTS:  no acute events overnight    Vital Signs Last 24 Hrs  T(C): 36.5 (03 Feb 2023 12:45), Max: 36.8 (03 Feb 2023 04:07)  T(F): 97.7 (03 Feb 2023 12:45), Max: 98.3 (03 Feb 2023 04:07)  HR: 67 (03 Feb 2023 12:45) (63 - 78)  BP: 148/90 (03 Feb 2023 12:45) (137/78 - 159/71)  BP(mean): 104 (03 Feb 2023 08:42) (88 - 104)  RR: 16 (03 Feb 2023 12:45) (16 - 20)  SpO2: 98% (03 Feb 2023 12:45) (95% - 99%)    Parameters below as of 03 Feb 2023 12:45  Patient On (Oxygen Delivery Method): room air    PHYSICAL EXAM:  General: in no acute distress  Eyes: PERRLA, EOMI; conjunctiva and sclera clear  Head: Normocephalic; atraumatic  ENMT: No nasal discharge; airway clear  Neck: Supple; non tender; no masses  Respiratory: No wheezes, rales or rhonchi  Cardiovascular: Regular rate and rhythm. S1 and S2 Normal; No murmurs, gallops or rubs  Gastrointestinal: Soft non-tender non-distended; Normal bowel sounds  Genitourinary: No costovertebral angle tenderness  Extremities: Normal range of motion, No clubbing, cyanosis or edema  Vascular: Peripheral pulses palpable 2+ bilaterally  Neurological: Alert and oriented x4  Skin: Warm and dry. No acute rash  Psychiatric: Cooperative and appropriate    I&O's Detail    02 Feb 2023 07:01  -  03 Feb 2023 07:00  --------------------------------------------------------  IN:    Oral Fluid: 670 mL  Total IN: 670 mL    OUT:    Blood Loss (mL): 3 mL  Total OUT: 3 mL    Total NET: 667 mL                        9.4    8.63  )-----------( 255      ( 03 Feb 2023 06:25 )             28.7     03 Feb 2023 06:25    142    |  108    |  10.8   ----------------------------<  111    3.9     |  27.0   |  0.50     Ca    9.3        03 Feb 2023 06:25    TPro  5.2    /  Alb  3.2    /  TBili  0.6    /  DBili  x      /  AST  15     /  ALT  9      /  AlkPhos  64     03 Feb 2023 06:25    CAPILLARY BLOOD GLUCOSE  POCT Blood Glucose.: 113 mg/dL (03 Feb 2023 11:26)  POCT Blood Glucose.: 117 mg/dL (03 Feb 2023 08:42)  POCT Blood Glucose.: 109 mg/dL (02 Feb 2023 22:50)  POCT Blood Glucose.: 140 mg/dL (02 Feb 2023 16:46)    LIVER FUNCTIONS - ( 03 Feb 2023 06:25 )  Alb: 3.2 g/dL / Pro: 5.2 g/dL / ALK PHOS: 64 U/L / ALT: 9 U/L / AST: 15 U/L / GGT: x           MEDICATIONS  (STANDING):  allopurinol 100 milliGRAM(s) Oral daily  atorvastatin 40 milliGRAM(s) Oral at bedtime  balsalazide 2250 milliGRAM(s) Oral <User Schedule>  bisacodyl 5 milliGRAM(s) Oral at bedtime  chlorhexidine 2% Cloths 1 Application(s) Topical <User Schedule>  cholecalciferol 2000 Unit(s) Oral daily  clotrimazole/betamethasone Cream 1 Application(s) Topical two times a day  cyanocobalamin 1000 MICROGram(s) Oral daily  dextrose 5%. 1000 milliLiter(s) (100 mL/Hr) IV Continuous <Continuous>  dextrose 5%. 1000 milliLiter(s) (50 mL/Hr) IV Continuous <Continuous>  dextrose 50% Injectable 25 Gram(s) IV Push once  dextrose 50% Injectable 12.5 Gram(s) IV Push once  dextrose 50% Injectable 25 Gram(s) IV Push once  glucagon  Injectable 1 milliGRAM(s) IntraMuscular once  guaiFENesin  milliGRAM(s) Oral every 12 hours  hydrALAZINE 10 milliGRAM(s) Oral three times a day  insulin lispro (ADMELOG) corrective regimen sliding scale   SubCutaneous three times a day before meals  levothyroxine 125 MICROGram(s) Oral daily  montelukast 10 milliGRAM(s) Oral daily  pantoprazole  Injectable 40 milliGRAM(s) IV Push two times a day    MEDICATIONS  (PRN):  acetaminophen     Tablet .. 650 milliGRAM(s) Oral every 6 hours PRN Mild Pain (1 - 3)  albuterol    90 MICROgram(s) HFA Inhaler 2 Puff(s) Inhalation every 6 hours PRN Shortness of Breath and/or Wheezing  dextrose Oral Gel 15 Gram(s) Oral once PRN Blood Glucose LESS THAN 70 milliGRAM(s)/deciliter  ondansetron Injectable 4 milliGRAM(s) IV Push every 4 hours PRN Nausea and/or Vomiting      RADIOLOGY & ADDITIONAL TESTS:

## 2023-02-03 NOTE — PROGRESS NOTE ADULT - ASSESSMENT
78 y/o F with PMHx of HTN, HLD, hypothyroidism, DM type 2, and asthma who [place] [facility] [place] complaining of large episode of hematochezia. Upon arrival to ER, had two additional episodes. Pt with hgb 13.9 on presentation. Overnight, had an acute drop in hgb (repeat 8.4). Pt received a total of 6u PRBCs, 2u platelets, 2u FFP, and cryo. Underwent EGD on 1/25 which was revealing of clean based gastric and duodenal ulcers, unlikely to explain pt's severe GI bleeding. Pt was transferred to [facility] for CTA abd/pelvis and possible IR intervention in the setting of suspected lower GI bleed. Pt remained intubated s/p EGD. CTA abd/pelvis revealed acute uncomplicated sigmoid diverticulitis, and no site of active GI hemorrhage was identified. extubated in ICU and monitored w/o further gi hemorrhaged. stable for transfer to medicine.    #acute blood loss anemia: likely diverticular  - PPI BID  - CTA A/P w/ active gastrointestinal bleeding is identified in the right colon in the region of a right-sided colonic diverticulum   - S/p IR angiogram w/ no active extravasation.  - Appreciate GI recs- colonoscopy notable for severe diverticulosis without active bleed  - Appreciate surgery recs  - H/H stable to keep Hg> 8  - will need outpatient follow up with Dr. Whitaker for further management    #Afib w/ RVR- new  - S/p IV lopressor, x 1. S/p amiodarone 150 mg  - CHADVASc- 5, but elevated risk of bleeding  - TTE reviewed  - c/w tele monitor      #HTN  - will hold lasix and lisinopril GIVEN prudencio  - started hydral 10 TID given elevated BP    #Peptic ulcer disease:  - c/w PPI BID    #acute diverticulitis:  - completed augmentin    #UC: restart home balsalazide 750 mg oral capsule 3 cap(s) orally once a day  - family brought it in     #hypothyroid: synthroid.    #Dm2: a1c 5.2.     #Diet- regular diet  #Update patient's daughter, Xavier ([phone]) on 2/2.     Dispo- PT eval pending [facility] (pending insurance auth and accepting facilities).

## 2023-02-04 LAB
GLUCOSE BLDC GLUCOMTR-MCNC: 104 MG/DL — HIGH (ref 70–99)
GLUCOSE BLDC GLUCOMTR-MCNC: 121 MG/DL — HIGH (ref 70–99)
GLUCOSE BLDC GLUCOMTR-MCNC: 130 MG/DL — HIGH (ref 70–99)
GLUCOSE BLDC GLUCOMTR-MCNC: 94 MG/DL — SIGNIFICANT CHANGE UP (ref 70–99)

## 2023-02-04 PROCEDURE — 99233 SBSQ HOSP IP/OBS HIGH 50: CPT

## 2023-02-04 RX ORDER — LISINOPRIL 2.5 MG/1
20 TABLET ORAL DAILY
Refills: 0 | Status: DISCONTINUED | OUTPATIENT
Start: 2023-02-04 | End: 2023-02-06

## 2023-02-04 RX ADMIN — CLOTRIMAZOLE AND BETAMETHASONE DIPROPIONATE 1 APPLICATION(S): 10; .5 CREAM TOPICAL at 17:28

## 2023-02-04 RX ADMIN — PANTOPRAZOLE SODIUM 40 MILLIGRAM(S): 20 TABLET, DELAYED RELEASE ORAL at 05:40

## 2023-02-04 RX ADMIN — ATORVASTATIN CALCIUM 40 MILLIGRAM(S): 80 TABLET, FILM COATED ORAL at 23:00

## 2023-02-04 RX ADMIN — Medication 5 MILLIGRAM(S): at 23:00

## 2023-02-04 RX ADMIN — Medication 100 MILLIGRAM(S): at 11:51

## 2023-02-04 RX ADMIN — MONTELUKAST 10 MILLIGRAM(S): 4 TABLET, CHEWABLE ORAL at 11:51

## 2023-02-04 RX ADMIN — Medication 600 MILLIGRAM(S): at 05:40

## 2023-02-04 RX ADMIN — PREGABALIN 1000 MICROGRAM(S): 225 CAPSULE ORAL at 11:51

## 2023-02-04 RX ADMIN — Medication 2000 UNIT(S): at 11:51

## 2023-02-04 RX ADMIN — Medication 600 MILLIGRAM(S): at 17:29

## 2023-02-04 RX ADMIN — CHLORHEXIDINE GLUCONATE 1 APPLICATION(S): 213 SOLUTION TOPICAL at 05:41

## 2023-02-04 RX ADMIN — Medication 10 MILLIGRAM(S): at 05:40

## 2023-02-04 RX ADMIN — Medication 125 MICROGRAM(S): at 05:40

## 2023-02-04 RX ADMIN — CLOTRIMAZOLE AND BETAMETHASONE DIPROPIONATE 1 APPLICATION(S): 10; .5 CREAM TOPICAL at 05:43

## 2023-02-04 NOTE — PROGRESS NOTE ADULT - ASSESSMENT
76 y/o F w/ PMH of HTN, HLD, hypothyroidism, DM 2 and asthma who [place] [facility] [place] complaining of large episode of hematochezia.  Upon arrival to ER, had two additional episodes. Pt with hgb 13.9 on presentation then acutely dropped to 8.4.  Pt received a total of 6u PRBCs, 2u platelets, 2u FFP, and cryo.  Underwent EGD on 1/25 which was revealing of clean based gastric and duodenal ulcers, unlikely to explain pt's severe GI bleeding. Pt was transferred to [facility] for CTA abd/pelvis and possible IR intervention in the setting of suspected lower GI bleed. Pt remained intubated s/p EGD. CTA abd/pelvis revealed acute uncomplicated sigmoid diverticulitis, and no site of active GI hemorrhage was identified.  Pt was extubated in ICU and monitored w/o further gi hemorrhage and transfered to medicine for further management        Acute blood loss anemia likely diverticular  - H/H remains low but stable   - CTA A/P w/ active gastrointestinal bleeding is identified in the rt colon in the region of a right-sided colonic diverticulum   - s/p IR angiogram w/ no active extravasation  - s/p 6u PRBC total during hospital course   - EGD reported gastric and duodenal ulcers but unlikley to explain pts bleeding   - c/w PPI BID and avoid NSAIDs  - Colonoscopy notable for severe diverticulosis without active bleed  - Will f/u w/ Dr. Whitaker outpt for further management   - Monitor CBC and transfuse if Hb<8       New onset AF w/ RVR  - RVR resolved   - s/p IV lopressor x 1 and amiodarone 150 mg  - CHADVASc = 5 but given elevated risk of bleeding will hold off on AC  - TTE reviewed  - c/w tele monitor        HTN / HLD / HFpEF  - Lasix and lisinopril held in light of CORKY but will resume given CORKY resolved   - Will d/c hydralazine  - Clinically euvolemic   - TTE reviewed and noted above       UC  - Diagnosed 8 years ago  - Restart home balsalazide   - c/w GI outpt      Hypothyroid  - c/w synthroid      Dm2  - A1c 5.2  - Home meds on hold for now, resume on d/c  - ISS and hypoglycemic protocol in place   - Maintain BG<180         VTE ppx: SCDs    Dispo:  PT recommending [facility]; pending insurance auth.

## 2023-02-04 NOTE — PROGRESS NOTE ADULT - SUBJECTIVE AND OBJECTIVE BOX
Chief Complaint:  GIB    SUBJECTIVE / OVERNIGHT EVENTS:  No acute events reported overnight.  Had a BM this morning and denied blood.  Pt also denies chest pain, SOB, abd pain, N/V, fever, chills, dysuria or any other complaints. All remainder ROS negative.       I&O's Summary        PHYSICAL EXAM:  Vital Signs Last 24 Hrs  T(C): 36.6 (04 Feb 2023 11:35), Max: 36.8 (03 Feb 2023 20:55)  T(F): 97.8 (04 Feb 2023 11:35), Max: 98.2 (03 Feb 2023 20:55)  HR: 70 (04 Feb 2023 11:35) (68 - 87)  BP: 145/85 (04 Feb 2023 11:35) (120/73 - 155/85)  BP(mean): --  RR: 18 (04 Feb 2023 11:35) (17 - 18)  SpO2: 97% (04 Feb 2023 11:35) (96% - 97%)    Parameters below as of 03 Feb 2023 20:55  Patient On (Oxygen Delivery Method): room air        GENERAL: pt examined bedside, laying comfortably in bed in NAD  HEENT: NC/AT, moist oral mucosa, clear conjunctiva, sclera nonicteric  RESPIRATORY: Normal respiratory effort, no wheezing, rhonchi, rales  CARDIOVASCULAR: RRR, normal S1 and S2  ABDOMEN: soft, NT/ND, normoactive bowel sounds, no rebound/guarding  EXTREMITIES: No cynaosis, no clubbing, no lower extremity edema  NEUROLOGY: A+O to person, place, and time, no focal neurologic deficits appreciated   SKIN: No rashes or no palpable lesions        LABS:                        9.4    8.63  )-----------( 255      ( 03 Feb 2023 06:25 )             28.7     02-03    142  |  108  |  10.8  ----------------------------<  111<H>  3.9   |  27.0  |  0.50    Ca    9.3      03 Feb 2023 06:25    TPro  5.2<L>  /  Alb  3.2<L>  /  TBili  0.6  /  DBili  x   /  AST  15  /  ALT  9   /  AlkPhos  64  02-03          CAPILLARY BLOOD GLUCOSE      POCT Blood Glucose.: 104 mg/dL (04 Feb 2023 11:40)  POCT Blood Glucose.: 94 mg/dL (04 Feb 2023 07:37)  POCT Blood Glucose.: 122 mg/dL (03 Feb 2023 16:41)        RADIOLOGY & ADDITIONAL TESTS:    < from: US Joint Nonvasc Extremity Limited, Right (01.30.23 @ 03:34) >  Edema without obvious drainable fluid collection in the visualized right   arm soft tissue. Recommend clinical correlation to assess cellulitis. If   clinically indicated, short-term follow-up may be obtained for further   fashion.    < end of copied text >    < from: IR Angiogram Visceral Danette Supra (01.28.23 @ 17:30) >  FINDINGS:    1. No active arterial extravasation noted following superior mesenteric   and middle colic artery angiography    < end of copied text >      < from: IR Embolization Procedure (01.28.23 @ 17:28) >  FINDINGS:    1. No active arterial extravasation noted following superior mesenteric   and middle colic artery angiography    < end of copied text >    < from: CT Angio Abdomen and Pelvis w/ IV Cont (01.25.23 @ 16:26) >  IMPRESSION:  Acute uncomplicated sigmoid diverticulitis.  No site of active GI hemorrhage identified    < end of copied text >      < from: TTE Echo Complete w/ Contrast w/ Doppler (01.30.23 @ 09:48) >  Summary:   1. Technically difficult study.   2. Left ventricular ejection fraction, by visual estimation, is 55 to   60%.   3. Normal global left ventricular systolic function.   4. Spectral Doppler shows pseudonormal pattern of left ventricular   myocardial filling (Grade II diastolic dysfunction).   5. Mild mitral annular calcification.   6. Mild mitral valve regurgitation.    < end of copied text >      MEDICATIONS  (STANDING):  allopurinol 100 milliGRAM(s) Oral daily  atorvastatin 40 milliGRAM(s) Oral at bedtime  balsalazide 2250 milliGRAM(s) Oral <User Schedule>  bisacodyl 5 milliGRAM(s) Oral at bedtime  chlorhexidine 2% Cloths 1 Application(s) Topical <User Schedule>  cholecalciferol 2000 Unit(s) Oral daily  clotrimazole/betamethasone Cream 1 Application(s) Topical two times a day  cyanocobalamin 1000 MICROGram(s) Oral daily  dextrose 5%. 1000 milliLiter(s) (100 mL/Hr) IV Continuous <Continuous>  dextrose 5%. 1000 milliLiter(s) (50 mL/Hr) IV Continuous <Continuous>  dextrose 50% Injectable 25 Gram(s) IV Push once  dextrose 50% Injectable 12.5 Gram(s) IV Push once  dextrose 50% Injectable 25 Gram(s) IV Push once  glucagon  Injectable 1 milliGRAM(s) IntraMuscular once  guaiFENesin  milliGRAM(s) Oral every 12 hours  hydrALAZINE 10 milliGRAM(s) Oral three times a day  insulin lispro (ADMELOG) corrective regimen sliding scale   SubCutaneous three times a day before meals  levothyroxine 125 MICROGram(s) Oral daily  montelukast 10 milliGRAM(s) Oral daily  pantoprazole  Injectable 40 milliGRAM(s) IV Push two times a day    MEDICATIONS  (PRN):  acetaminophen     Tablet .. 650 milliGRAM(s) Oral every 6 hours PRN Mild Pain (1 - 3)  albuterol    90 MICROgram(s) HFA Inhaler 2 Puff(s) Inhalation every 6 hours PRN Shortness of Breath and/or Wheezing  dextrose Oral Gel 15 Gram(s) Oral once PRN Blood Glucose LESS THAN 70 milliGRAM(s)/deciliter  ondansetron Injectable 4 milliGRAM(s) IV Push every 4 hours PRN Nausea and/or Vomiting

## 2023-02-05 LAB
ANION GAP SERPL CALC-SCNC: 11 MMOL/L — SIGNIFICANT CHANGE UP (ref 5–17)
BUN SERPL-MCNC: 15.6 MG/DL — SIGNIFICANT CHANGE UP (ref 8–20)
CALCIUM SERPL-MCNC: 9.1 MG/DL — SIGNIFICANT CHANGE UP (ref 8.4–10.5)
CHLORIDE SERPL-SCNC: 107 MMOL/L — SIGNIFICANT CHANGE UP (ref 96–108)
CO2 SERPL-SCNC: 25 MMOL/L — SIGNIFICANT CHANGE UP (ref 22–29)
CREAT SERPL-MCNC: 0.53 MG/DL — SIGNIFICANT CHANGE UP (ref 0.5–1.3)
EGFR: 95 ML/MIN/1.73M2 — SIGNIFICANT CHANGE UP
GLUCOSE BLDC GLUCOMTR-MCNC: 106 MG/DL — HIGH (ref 70–99)
GLUCOSE BLDC GLUCOMTR-MCNC: 189 MG/DL — HIGH (ref 70–99)
GLUCOSE BLDC GLUCOMTR-MCNC: 189 MG/DL — HIGH (ref 70–99)
GLUCOSE BLDC GLUCOMTR-MCNC: 88 MG/DL — SIGNIFICANT CHANGE UP (ref 70–99)
GLUCOSE SERPL-MCNC: 113 MG/DL — HIGH (ref 70–99)
HCT VFR BLD CALC: 29.5 % — LOW (ref 34.5–45)
HGB BLD-MCNC: 9.6 G/DL — LOW (ref 11.5–15.5)
MAGNESIUM SERPL-MCNC: 1.8 MG/DL — SIGNIFICANT CHANGE UP (ref 1.6–2.6)
MCHC RBC-ENTMCNC: 31 PG — SIGNIFICANT CHANGE UP (ref 27–34)
MCHC RBC-ENTMCNC: 32.5 GM/DL — SIGNIFICANT CHANGE UP (ref 32–36)
MCV RBC AUTO: 95.2 FL — SIGNIFICANT CHANGE UP (ref 80–100)
PLATELET # BLD AUTO: 297 K/UL — SIGNIFICANT CHANGE UP (ref 150–400)
POTASSIUM SERPL-MCNC: 4 MMOL/L — SIGNIFICANT CHANGE UP (ref 3.5–5.3)
POTASSIUM SERPL-SCNC: 4 MMOL/L — SIGNIFICANT CHANGE UP (ref 3.5–5.3)
RBC # BLD: 3.1 M/UL — LOW (ref 3.8–5.2)
RBC # FLD: 15.5 % — HIGH (ref 10.3–14.5)
SARS-COV-2 RNA SPEC QL NAA+PROBE: SIGNIFICANT CHANGE UP
SODIUM SERPL-SCNC: 142 MMOL/L — SIGNIFICANT CHANGE UP (ref 135–145)
WBC # BLD: 7.88 K/UL — SIGNIFICANT CHANGE UP (ref 3.8–10.5)
WBC # FLD AUTO: 7.88 K/UL — SIGNIFICANT CHANGE UP (ref 3.8–10.5)

## 2023-02-05 PROCEDURE — 99232 SBSQ HOSP IP/OBS MODERATE 35: CPT

## 2023-02-05 RX ADMIN — CLOTRIMAZOLE AND BETAMETHASONE DIPROPIONATE 1 APPLICATION(S): 10; .5 CREAM TOPICAL at 17:46

## 2023-02-05 RX ADMIN — Medication 650 MILLIGRAM(S): at 04:00

## 2023-02-05 RX ADMIN — MONTELUKAST 10 MILLIGRAM(S): 4 TABLET, CHEWABLE ORAL at 11:22

## 2023-02-05 RX ADMIN — Medication 600 MILLIGRAM(S): at 06:10

## 2023-02-05 RX ADMIN — PANTOPRAZOLE SODIUM 40 MILLIGRAM(S): 20 TABLET, DELAYED RELEASE ORAL at 06:09

## 2023-02-05 RX ADMIN — Medication 2: at 17:44

## 2023-02-05 RX ADMIN — Medication 100 MILLIGRAM(S): at 11:18

## 2023-02-05 RX ADMIN — Medication 2000 UNIT(S): at 11:18

## 2023-02-05 RX ADMIN — ATORVASTATIN CALCIUM 40 MILLIGRAM(S): 80 TABLET, FILM COATED ORAL at 21:50

## 2023-02-05 RX ADMIN — Medication 125 MICROGRAM(S): at 06:10

## 2023-02-05 RX ADMIN — CLOTRIMAZOLE AND BETAMETHASONE DIPROPIONATE 1 APPLICATION(S): 10; .5 CREAM TOPICAL at 06:18

## 2023-02-05 RX ADMIN — Medication 600 MILLIGRAM(S): at 17:46

## 2023-02-05 RX ADMIN — Medication 20 MILLIGRAM(S): at 06:10

## 2023-02-05 RX ADMIN — PREGABALIN 1000 MICROGRAM(S): 225 CAPSULE ORAL at 11:18

## 2023-02-05 RX ADMIN — Medication 5 MILLIGRAM(S): at 21:50

## 2023-02-05 RX ADMIN — CHLORHEXIDINE GLUCONATE 1 APPLICATION(S): 213 SOLUTION TOPICAL at 06:08

## 2023-02-05 RX ADMIN — LISINOPRIL 20 MILLIGRAM(S): 2.5 TABLET ORAL at 06:09

## 2023-02-05 NOTE — PROGRESS NOTE ADULT - PROVIDER SPECIALTY LIST ADULT
Colorectal Surgery
Gastroenterology
Hospitalist
Cardiology
Gastroenterology
Hospitalist
Internal Medicine
Colorectal Surgery
Critical Care
Gastroenterology
Hospitalist
Internal Medicine
Hospitalist
Hospitalist
Gastroenterology

## 2023-02-05 NOTE — PROGRESS NOTE ADULT - SUBJECTIVE AND OBJECTIVE BOX
Chief Complaint:  GIB    SUBJECTIVE / OVERNIGHT EVENTS:  No acute events reported overnight.  No further bloody BMs reported.  Pt offers no acute complaints at this time.        I&O's Summary        PHYSICAL EXAM:  Vital Signs Last 24 Hrs  T(C): 36.4 (05 Feb 2023 10:54), Max: 36.6 (05 Feb 2023 04:24)  T(F): 97.5 (05 Feb 2023 10:54), Max: 97.8 (05 Feb 2023 04:24)  HR: 62 (05 Feb 2023 10:54) (62 - 71)  BP: 108/66 (05 Feb 2023 10:54) (108/66 - 136/74)  BP(mean): --  RR: 12 (05 Feb 2023 10:54) (12 - 18)  SpO2: 98% (05 Feb 2023 10:54) (96% - 98%)    Parameters below as of 05 Feb 2023 10:54  Patient On (Oxygen Delivery Method): room air            GENERAL: pt examined bedside, laying comfortably in bed in NAD  HEENT: NC/AT, moist oral mucosa, clear conjunctiva, sclera nonicteric  RESPIRATORY: Normal respiratory effort, no wheezing, rhonchi, rales  CARDIOVASCULAR: RRR, normal S1 and S2  ABDOMEN: soft, NT/ND, normoactive bowel sounds, no rebound/guarding  EXTREMITIES: No cynaosis, no clubbing, no lower extremity edema  NEUROLOGY: A+O to person, place, and time, no focal neurologic deficits appreciated   SKIN: No rashes or no palpable lesions        LABS:                                   9.6    7.88  )-----------( 297      ( 05 Feb 2023 05:50 )             29.5       02-05    142  |  107  |  15.6  ----------------------------<  113<H>  4.0   |  25.0  |  0.53    Ca    9.1      05 Feb 2023 05:50  Mg     1.8     02-05        CAPILLARY BLOOD GLUCOSE      POCT Blood Glucose.: 104 mg/dL (04 Feb 2023 11:40)  POCT Blood Glucose.: 94 mg/dL (04 Feb 2023 07:37)  POCT Blood Glucose.: 122 mg/dL (03 Feb 2023 16:41)        RADIOLOGY & ADDITIONAL TESTS:    < from: US Joint Nonvasc Extremity Limited, Right (01.30.23 @ 03:34) >  Edema without obvious drainable fluid collection in the visualized right   arm soft tissue. Recommend clinical correlation to assess cellulitis. If   clinically indicated, short-term follow-up may be obtained for further   fashion.    < end of copied text >    < from: IR Angiogram Visceral Danette Supra (01.28.23 @ 17:30) >  FINDINGS:    1. No active arterial extravasation noted following superior mesenteric   and middle colic artery angiography    < end of copied text >      < from: IR Embolization Procedure (01.28.23 @ 17:28) >  FINDINGS:    1. No active arterial extravasation noted following superior mesenteric   and middle colic artery angiography    < end of copied text >    < from: CT Angio Abdomen and Pelvis w/ IV Cont (01.25.23 @ 16:26) >  IMPRESSION:  Acute uncomplicated sigmoid diverticulitis.  No site of active GI hemorrhage identified    < end of copied text >      < from: TTE Echo Complete w/ Contrast w/ Doppler (01.30.23 @ 09:48) >  Summary:   1. Technically difficult study.   2. Left ventricular ejection fraction, by visual estimation, is 55 to   60%.   3. Normal global left ventricular systolic function.   4. Spectral Doppler shows pseudonormal pattern of left ventricular   myocardial filling (Grade II diastolic dysfunction).   5. Mild mitral annular calcification.   6. Mild mitral valve regurgitation.    < end of copied text >      MEDICATIONS  (STANDING):  allopurinol 100 milliGRAM(s) Oral daily  atorvastatin 40 milliGRAM(s) Oral at bedtime  balsalazide 2250 milliGRAM(s) Oral <User Schedule>  bisacodyl 5 milliGRAM(s) Oral at bedtime  chlorhexidine 2% Cloths 1 Application(s) Topical <User Schedule>  cholecalciferol 2000 Unit(s) Oral daily  clotrimazole/betamethasone Cream 1 Application(s) Topical two times a day  cyanocobalamin 1000 MICROGram(s) Oral daily  dextrose 5%. 1000 milliLiter(s) (100 mL/Hr) IV Continuous <Continuous>  dextrose 5%. 1000 milliLiter(s) (50 mL/Hr) IV Continuous <Continuous>  dextrose 50% Injectable 25 Gram(s) IV Push once  dextrose 50% Injectable 12.5 Gram(s) IV Push once  dextrose 50% Injectable 25 Gram(s) IV Push once  glucagon  Injectable 1 milliGRAM(s) IntraMuscular once  guaiFENesin  milliGRAM(s) Oral every 12 hours  hydrALAZINE 10 milliGRAM(s) Oral three times a day  insulin lispro (ADMELOG) corrective regimen sliding scale   SubCutaneous three times a day before meals  levothyroxine 125 MICROGram(s) Oral daily  montelukast 10 milliGRAM(s) Oral daily  pantoprazole  Injectable 40 milliGRAM(s) IV Push two times a day    MEDICATIONS  (PRN):  acetaminophen     Tablet .. 650 milliGRAM(s) Oral every 6 hours PRN Mild Pain (1 - 3)  albuterol    90 MICROgram(s) HFA Inhaler 2 Puff(s) Inhalation every 6 hours PRN Shortness of Breath and/or Wheezing  dextrose Oral Gel 15 Gram(s) Oral once PRN Blood Glucose LESS THAN 70 milliGRAM(s)/deciliter  ondansetron Injectable 4 milliGRAM(s) IV Push every 4 hours PRN Nausea and/or Vomiting

## 2023-02-05 NOTE — PROGRESS NOTE ADULT - NUTRITIONAL ASSESSMENT
This patient has been assessed with a concern for Malnutrition and has been determined to have a diagnosis/diagnoses of Moderate protein-calorie malnutrition.    This patient is being managed with:   Diet Full Liquid-  Entered: Jan 30 2023  2:59PM    
This patient has been assessed with a concern for Malnutrition and has been determined to have a diagnosis/diagnoses of Moderate protein-calorie malnutrition.    This patient is being managed with:   Diet NPO after Midnight-     NPO Start Date: 29-Jan-2023   NPO Start Time: 23:59  Except Medications  Entered: Jan 29 2023  8:17AM    Diet Clear Liquid-  Consistent Carbohydrate {No Snacks} (CSTCHO)  DASH/TLC {Sodium & Cholesterol Restricted} (DASH)  Entered: Jan 28 2023  8:34PM    
This patient has been assessed with a concern for Malnutrition and has been determined to have a diagnosis/diagnoses of Moderate protein-calorie malnutrition.    This patient is being managed with:   Diet Regular-  Entered: Jan 31 2023 12:56PM    
This patient has been assessed with a concern for Malnutrition and has been determined to have a diagnosis/diagnoses of Moderate protein-calorie malnutrition.    This patient is being managed with:   Diet Full Liquid-  Entered: Jan 30 2023  2:59PM    
This patient has been assessed with a concern for Malnutrition and has been determined to have a diagnosis/diagnoses of Moderate protein-calorie malnutrition.    This patient is being managed with:   Diet Regular-  Entered: Jan 31 2023 12:56PM    
This patient has been assessed with a concern for Malnutrition and has been determined to have a diagnosis/diagnoses of Moderate protein-calorie malnutrition.    This patient is being managed with:   Diet Low Fiber-  Entered: Jan 27 2023  9:40AM    
This patient has been assessed with a concern for Malnutrition and has been determined to have a diagnosis/diagnoses of Moderate protein-calorie malnutrition.    This patient is being managed with:   Diet Regular-  Entered: Jan 31 2023 12:56PM    
This patient has been assessed with a concern for Malnutrition and has been determined to have a diagnosis/diagnoses of Moderate protein-calorie malnutrition.    This patient is being managed with:   Diet NPO after Midnight-     NPO Start Date: 29-Jan-2023   NPO Start Time: 23:59  Except Medications  Entered: Jan 29 2023  8:17AM    Diet Clear Liquid-  Consistent Carbohydrate {No Snacks} (CSTCHO)  DASH/TLC {Sodium & Cholesterol Restricted} (DASH)  Entered: Jan 28 2023  8:34PM    
This patient has been assessed with a concern for Malnutrition and has been determined to have a diagnosis/diagnoses of Moderate protein-calorie malnutrition.    This patient is being managed with:   Diet NPO after Midnight-     NPO Start Date: 29-Jan-2023   NPO Start Time: 23:59  Except Medications  Entered: Jan 29 2023  8:17AM    Diet Clear Liquid-  Consistent Carbohydrate {No Snacks} (CSTCHO)  DASH/TLC {Sodium & Cholesterol Restricted} (DASH)  Entered: Jan 28 2023  8:34PM    
This patient has been assessed with a concern for Malnutrition and has been determined to have a diagnosis/diagnoses of Moderate protein-calorie malnutrition.    This patient is being managed with:   Diet Regular-  Entered: Jan 31 2023 12:56PM    
This patient has been assessed with a concern for Malnutrition and has been determined to have a diagnosis/diagnoses of Moderate protein-calorie malnutrition.    This patient is being managed with:   Diet Regular-  Entered: Jan 31 2023 12:56PM

## 2023-02-05 NOTE — PROGRESS NOTE ADULT - ASSESSMENT
76 y/o F w/ PMH of HTN, HLD, hypothyroidism, DM 2 and asthma who initially [place] [facility] [place] complaining of large episode of hematochezia.  Upon arrival to ER, had two additional episodes. Pt with hgb 13.9 on presentation then acutely dropped to 8.4.  Pt received a total of 6u PRBCs, 2u platelets, 2u FFP, and cryo.  Underwent EGD on 1/25 which was revealing of clean based gastric and duodenal ulcers, unlikely to explain pt's severe GI bleeding. Pt was transferred to [facility] for CTA abd/pelvis and possible IR intervention in the setting of suspected lower GI bleed. Pt remained intubated s/p EGD. CTA abd/pelvis revealed acute uncomplicated sigmoid diverticulitis, and no site of active GI hemorrhage was identified.  Pt was extubated in ICU and monitored w/o further gi hemorrhage and transfered to medicine for further management.         Acute blood loss anemia likely diverticular  - H/H remains low but stable   - CTA A/P w/ active gastrointestinal bleeding is identified in the rt colon in the region of a right-sided colonic diverticulum   - s/p IR angiogram w/ no active extravasation  - s/p 6u PRBC total during hospital course   - EGD reported gastric and duodenal ulcers but unlikley to explain pts bleeding   - c/w PPI BID and avoid NSAIDs  - Colonoscopy notable for severe diverticulosis without active bleed  - Will f/u w/ Dr. Whitaker outpt for further management   - Monitor CBC and transfuse if Hb<8       New onset AF w/ RVR  - RVR resolved   - s/p IV lopressor x 1 and amiodarone 150 mg  - CHADVASc = 5 but given elevated risk of bleeding will hold off on AC  - TTE reviewed  - c/w tele monitor        HTN / HLD / HFpEF  - Lasix and lisinopril held in light of CORKY but will resume given CORKY resolved   - Will d/c hydralazine  - Clinically euvolemic   - TTE reviewed and noted above       UC  - Diagnosed 8 years ago  - Restarted home balsalazide   - c/w GI outpt      Hypothyroid  - c/w synthroid      Dm2  - A1c 5.2  - Home meds on hold for now, resume on d/c  - ISS and hypoglycemic protocol in place   - Maintain BG<180         VTE ppx: SCDs    Dispo:  PT recommending [facility]; pending insurance auth.

## 2023-02-06 ENCOUNTER — TRANSCRIPTION ENCOUNTER (OUTPATIENT)
Age: 78
End: 2023-02-06

## 2023-02-06 VITALS
SYSTOLIC BLOOD PRESSURE: 136 MMHG | RESPIRATION RATE: 16 BRPM | DIASTOLIC BLOOD PRESSURE: 54 MMHG | TEMPERATURE: 98 F | HEART RATE: 84 BPM | OXYGEN SATURATION: 97 %

## 2023-02-06 LAB
GLUCOSE BLDC GLUCOMTR-MCNC: 109 MG/DL — HIGH (ref 70–99)
GLUCOSE BLDC GLUCOMTR-MCNC: 133 MG/DL — HIGH (ref 70–99)

## 2023-02-06 PROCEDURE — U0003: CPT

## 2023-02-06 PROCEDURE — 97163 PT EVAL HIGH COMPLEX 45 MIN: CPT

## 2023-02-06 PROCEDURE — 36430 TRANSFUSION BLD/BLD COMPNT: CPT

## 2023-02-06 PROCEDURE — 85610 PROTHROMBIN TIME: CPT

## 2023-02-06 PROCEDURE — 82962 GLUCOSE BLOOD TEST: CPT

## 2023-02-06 PROCEDURE — 75726 ARTERY X-RAYS ABDOMEN: CPT

## 2023-02-06 PROCEDURE — 83036 HEMOGLOBIN GLYCOSYLATED A1C: CPT

## 2023-02-06 PROCEDURE — 82435 ASSAY OF BLOOD CHLORIDE: CPT

## 2023-02-06 PROCEDURE — C1894: CPT

## 2023-02-06 PROCEDURE — 74178 CT ABD&PLV WO CNTR FLWD CNTR: CPT

## 2023-02-06 PROCEDURE — U0005: CPT

## 2023-02-06 PROCEDURE — 75894 X-RAYS TRANSCATH THERAPY: CPT

## 2023-02-06 PROCEDURE — 86900 BLOOD TYPING SEROLOGIC ABO: CPT

## 2023-02-06 PROCEDURE — 80053 COMPREHEN METABOLIC PANEL: CPT

## 2023-02-06 PROCEDURE — 74174 CTA ABD&PLVS W/CONTRAST: CPT

## 2023-02-06 PROCEDURE — 94640 AIRWAY INHALATION TREATMENT: CPT

## 2023-02-06 PROCEDURE — C1760: CPT

## 2023-02-06 PROCEDURE — 36415 COLL VENOUS BLD VENIPUNCTURE: CPT

## 2023-02-06 PROCEDURE — 85014 HEMATOCRIT: CPT

## 2023-02-06 PROCEDURE — 84100 ASSAY OF PHOSPHORUS: CPT

## 2023-02-06 PROCEDURE — 86923 COMPATIBILITY TEST ELECTRIC: CPT

## 2023-02-06 PROCEDURE — 80048 BASIC METABOLIC PNL TOTAL CA: CPT

## 2023-02-06 PROCEDURE — 83735 ASSAY OF MAGNESIUM: CPT

## 2023-02-06 PROCEDURE — 93005 ELECTROCARDIOGRAM TRACING: CPT

## 2023-02-06 PROCEDURE — 87640 STAPH A DNA AMP PROBE: CPT

## 2023-02-06 PROCEDURE — 82330 ASSAY OF CALCIUM: CPT

## 2023-02-06 PROCEDURE — 76882 US LMTD JT/FCL EVL NVASC XTR: CPT

## 2023-02-06 PROCEDURE — 85027 COMPLETE CBC AUTOMATED: CPT

## 2023-02-06 PROCEDURE — 83605 ASSAY OF LACTIC ACID: CPT

## 2023-02-06 PROCEDURE — 86803 HEPATITIS C AB TEST: CPT

## 2023-02-06 PROCEDURE — P9016: CPT

## 2023-02-06 PROCEDURE — 84443 ASSAY THYROID STIM HORMONE: CPT

## 2023-02-06 PROCEDURE — 84295 ASSAY OF SERUM SODIUM: CPT

## 2023-02-06 PROCEDURE — 75774 ARTERY X-RAY EACH VESSEL: CPT

## 2023-02-06 PROCEDURE — 86850 RBC ANTIBODY SCREEN: CPT

## 2023-02-06 PROCEDURE — 82947 ASSAY GLUCOSE BLOOD QUANT: CPT

## 2023-02-06 PROCEDURE — 84132 ASSAY OF SERUM POTASSIUM: CPT

## 2023-02-06 PROCEDURE — 85730 THROMBOPLASTIN TIME PARTIAL: CPT

## 2023-02-06 PROCEDURE — 36600 WITHDRAWAL OF ARTERIAL BLOOD: CPT

## 2023-02-06 PROCEDURE — 99239 HOSP IP/OBS DSCHRG MGMT >30: CPT

## 2023-02-06 PROCEDURE — C8929: CPT

## 2023-02-06 PROCEDURE — 85025 COMPLETE CBC W/AUTO DIFF WBC: CPT

## 2023-02-06 PROCEDURE — 94003 VENT MGMT INPAT SUBQ DAY: CPT

## 2023-02-06 PROCEDURE — C1769: CPT

## 2023-02-06 PROCEDURE — 86901 BLOOD TYPING SEROLOGIC RH(D): CPT

## 2023-02-06 PROCEDURE — 85018 HEMOGLOBIN: CPT

## 2023-02-06 PROCEDURE — 71045 X-RAY EXAM CHEST 1 VIEW: CPT

## 2023-02-06 PROCEDURE — 84484 ASSAY OF TROPONIN QUANT: CPT

## 2023-02-06 PROCEDURE — 76942 ECHO GUIDE FOR BIOPSY: CPT

## 2023-02-06 PROCEDURE — 82803 BLOOD GASES ANY COMBINATION: CPT

## 2023-02-06 PROCEDURE — 87641 MR-STAPH DNA AMP PROBE: CPT

## 2023-02-06 PROCEDURE — 88305 TISSUE EXAM BY PATHOLOGIST: CPT

## 2023-02-06 RX ORDER — ASPIRIN/CALCIUM CARB/MAGNESIUM 324 MG
1 TABLET ORAL
Qty: 0 | Refills: 0 | DISCHARGE

## 2023-02-06 RX ORDER — PANTOPRAZOLE SODIUM 20 MG/1
1 TABLET, DELAYED RELEASE ORAL
Qty: 60 | Refills: 0
Start: 2023-02-06 | End: 2023-03-07

## 2023-02-06 RX ADMIN — MONTELUKAST 10 MILLIGRAM(S): 4 TABLET, CHEWABLE ORAL at 13:04

## 2023-02-06 RX ADMIN — Medication 20 MILLIGRAM(S): at 05:35

## 2023-02-06 RX ADMIN — CHLORHEXIDINE GLUCONATE 1 APPLICATION(S): 213 SOLUTION TOPICAL at 05:34

## 2023-02-06 RX ADMIN — Medication 125 MICROGRAM(S): at 05:35

## 2023-02-06 RX ADMIN — Medication 100 MILLIGRAM(S): at 13:04

## 2023-02-06 RX ADMIN — PREGABALIN 1000 MICROGRAM(S): 225 CAPSULE ORAL at 13:04

## 2023-02-06 RX ADMIN — PANTOPRAZOLE SODIUM 40 MILLIGRAM(S): 20 TABLET, DELAYED RELEASE ORAL at 05:34

## 2023-02-06 RX ADMIN — Medication 600 MILLIGRAM(S): at 05:35

## 2023-02-06 RX ADMIN — Medication 2000 UNIT(S): at 13:04

## 2023-02-06 RX ADMIN — LISINOPRIL 20 MILLIGRAM(S): 2.5 TABLET ORAL at 05:35

## 2023-02-06 RX ADMIN — CLOTRIMAZOLE AND BETAMETHASONE DIPROPIONATE 1 APPLICATION(S): 10; .5 CREAM TOPICAL at 05:49

## 2023-02-06 NOTE — DISCHARGE NOTE NURSING/CASE MANAGEMENT/SOCIAL WORK - NSDCPEFALRISK_GEN_ALL_CORE
For information on Fall & Injury Prevention, visit: [email or web address] OR  [email or web address] OR  https://www.cdc.gov/steadi/patient.html

## 2023-02-06 NOTE — CDI QUERY NOTE - NSCDIOTHERTXTBX_GEN_ALL_CORE_HH
Clinical documentation indicates that this patient had atrial fibrillation with rapid ventricular response.  Please further specify:    -Paroxysmal atrial fibrillation  -Other type of atrial fibrillation (please specify)  -Unknown type of atrial fibrillation (please specify)    SUPPORTING DOCUMENTATION AND/OR CLINICAL EVIDENCE:    -1/25 H&P: transferred from [facility] ICU for ABLA 2/2 LGIBCV - Actively titrating Deni gtt for goal MAP>65, sinus amalia to high 40 possibly 2/2 to additional sedation given for vent synchrony will transition to Levo gtt if bradycardia persists, HOLD Lisinopril 40mgQD, continue statin     -1/28 Cardiology: Problem: Atrial fibrillation with RVR. ·  Recommendation: -new onset, no prior hx of AFib -pt asymptomatic -telemetry monitoring -pt received Lopressor 5 mg IV with a drop in BP and no effect on HR -Amiodarone 150 mg IVPB -fluid bolus -check electrolytes, keep K>4 and Mg>2 -keep Hgb>8, transfuse as needed -TTE in am -CHADSVASC score 5, however, pt is not a candidate for anticoagulation due to active bleeding -may need ROYAL/DCCV -will need [facility] for outpatient monitoring    -1/30 Cardiology: given amiodarone converted to SR    < from: 12 Lead ECG (01.28.23 @ 22:15) >  Atrial Rate 163 BPM... Atrial fibrillation with rapid ventricular response  < end of copied text >    -Lopressor 5 mg IV   -Amiodarone 150 mg IVPB            EPS:

## 2023-02-06 NOTE — DISCHARGE NOTE NURSING/CASE MANAGEMENT/SOCIAL WORK - PATIENT PORTAL LINK FT
You can access the FollowMyHealth Patient Portal offered by [facility] by registering at the following website: [email or web address]. By joining [employer]’s FollowMyHealth portal, you will also be able to view your health information using other applications (apps) compatible with our system.

## 2023-05-24 ENCOUNTER — APPOINTMENT (OUTPATIENT)
Dept: MAMMOGRAPHY | Facility: CLINIC | Age: 78
End: 2023-05-24

## 2023-06-09 ENCOUNTER — APPOINTMENT (OUTPATIENT)
Dept: CT IMAGING | Facility: CLINIC | Age: 78
End: 2023-06-09
Payer: MEDICARE

## 2023-06-09 PROCEDURE — 74177 CT ABD & PELVIS W/CONTRAST: CPT | Mod: MH

## 2023-08-02 ENCOUNTER — APPOINTMENT (OUTPATIENT)
Dept: MAMMOGRAPHY | Facility: CLINIC | Age: 78
End: 2023-08-02
Payer: MEDICARE

## 2023-08-02 PROCEDURE — 77063 BREAST TOMOSYNTHESIS BI: CPT

## 2023-08-02 PROCEDURE — 77067 SCR MAMMO BI INCL CAD: CPT

## 2024-01-11 ENCOUNTER — APPOINTMENT (OUTPATIENT)
Dept: RADIOLOGY | Facility: CLINIC | Age: 79
End: 2024-01-11
Payer: MEDICARE

## 2024-01-11 PROCEDURE — 72170 X-RAY EXAM OF PELVIS: CPT

## 2025-01-09 NOTE — PATIENT PROFILE ADULT - ARE SIGNIFICANT INDICATORS COMPLETE.
MRN: [id number], Maikel Storey is a 69 year old male admitted for   Chief Complaint   Patient presents with    Wound Check   .    Admission Dt/Tm     12/28/2024  2:08 PM  Discharge DT/TM  1/8/2025 10:55 AM    Hospital Course     Maikel is a 69 year old male patient with past medical history of diabetes mellitus, peripheral artery disease, chronic kidney disease, history of osteomyelitis of left foot.  Presenting with right foot pain and swelling.  Patient has been following with his podiatrist, MRI showing osteomyelitis of the right fifth metatarsal with soft tissue abscess.  Patient was on oral antibiotics, instructed by his podiatrist to present to the ER.     # Osteomyelitis of the right foot  # MSSA/MRSA bacteremia  # Diabetic foot  # Peripheral vascular disease  # Diabetes mellitus  # Morbid obesity     Plan  Started on ceftriaxone and vancomycin  Consult infectious disease and podiatry for possible debridement/surgery  Resume insulin, plus additional sliding scale, monitor BS closely as patient is on high dose insulin  Resume antihypertensive medications     DVT prophylaxis     12/30: continue IV vancomycin/ rocephin, monitor fever curve/ CBC/ cx, plan for surgical intervention on Wednesday. 1/1/25 12/31: stop Vancomycin and Ceftriaxone , start Cefazolin 2 g every 8 hrs. Plan for partial ray amputation      1/1: plan for  I&D right foot with partial fifth ray amputation with adjacent tissue transfer closure.      1/2: s/p  I&D right foot with partial fifth ray amputation with adjacent tissue transfer closure. C/o rt knee pain will get rt knee XR      1/3: plan for FOZIA, c/o eft knee pain ( consider left Knee aspiration)      1/4: FOZIA with calcified nodule on aortic valve noncoronary cusp. Continue Abx per ID      1/5: s/p rt knee kenalog injection. Blood sugar went up, will increase insulin dose specially the shorting acting insulin      1/6: n the setting of MSSA bacteremia, FOZIA shows small AV lesion.  stop Cefazolin & start oxacillin. Plan to discharge on continuous infusion to complete 6 week treatment course.      : continue oxacillin. EOT 25: Continue oxacillin. Plan on 6 weeks of IV antibiotics. EOT 2025.   Lab Results  Last WBC:    WBC (K/mcL)   Date Value   2025 15.9 (H)       LAST RBC:    RBC (mil/mcL)   Date Value   2025 3.52 (L)     LAST HCT:    HCT (%)   Date Value   2025 30.9 (L)     LAST HGB:    HGB (g/dL)   Date Value   2025 9.8 (L)     LAST PLT:    PLT (K/mcL)   Date Value   2025 392     LAST SODIUM:  Sodium (mmol/L)   Date Value   2025 130 (L)     LAST POTASSIUM:    Potassium (mmol/L)   Date Value   2025 4.2     LAST CHLORIDE:    Chloride (mmol/L)   Date Value   2025 93 (L)     LAST GLUCOSE:    Glucose (mg/dL)   Date Value   2025 382 (H)     LAST CALCIUM:  Calcium (mg/dL)   Date Value   2025 9.3     LAST CO2:    Carbon Dioxide (mmol/L)   Date Value   2025 30     LAST BUN:  BUN (mg/dL)   Date Value   2025 130 (H)     LAST CREATININE:    Creatinine (mg/dL)   Date Value   2025 3.01 (H)     LAST MALBCR:  Microalbumin/ Creatinine Ratio (mg/g)   Date Value   04/15/2021 23.6     LAST TROPONIN:  No results found for: \"TROP\"    LAST MICRO:  No results found for: \"MICRO\"    Imaging  LAST ECHO/ECHO STRESS:  === 24 ===    TRANSESOPHAGEAL ECHO (FOZIA) W/ W/O IMAGING AGENT    - Narrative -  *[facility]*  [address] [place] [id number] [phone]  Transesophageal Echocardiogram (FOZIA)    Patient: Maikel Storey      Study Date/Time:     Gregg 3 2025 2:04PM  MRN:     [id number]                  FIN#:                [id number]  :     1955               Ht/Wt:               182cm 152kg  Age:     69                       BSA/BMI:             2.85m^2 45.9kg/m^2  Gender:  M                        Baseline BP:         114 / 64  *Ordering Physician:Michelle Morgan  DANIEL    *Referring Physician:* Michelle Brooke ; Michelle rBooke    *Attending Physician:* Michelle Brooke  *Performing Physician:* Isabelle Fonseca MD  *Diagnostic Physician:* Isabelle Fonseca MD  *Sonographer:* Ac Hercules,    ------------------------------------------------------------------------------  INDICATIONS:   R/o endocarditis.    ------------------------------------------------------------------------------  STUDY CONCLUSIONS  SUMMARY:    1. Aortic valve: There is a small echodensity attached to the NCC on the  aortic side. It could represent chronic calcific changes, but in the  setting of bacteremia a small vegetation cannot be excluded. There is mild  regurgitation.  2. Left ventricle: Wall thickness is increased. Hypertrophy is noted. Systolic  function is normal. The ejection fraction is 65%.  3. Right ventricle: Systolic function is normal.  4. Mitral valve: There is mild regurgitation.  5. Tricuspid valve: There is hypertrophy of the papillary muscle  There is mild regurgitation.  6. Pulmonic valve: There is mild regurgitation.  7. Pericardium, extracardiac: There is no pericardial effusion.    ------------------------------------------------------------------------------  STUDY DATA:  [place]  Procedure:   Surface ECG leads, blood pressure  measurements, and pulse oximetric signals were monitored.  Time out performed.  Sedation. was administered by anesthesiology. A transesophageal echocardiogram  was performed. 3D post processing was performed at an independent workstation.  Image quality was good. The transesophageal probe was removed. Specimens  removed: N/A. Estimated blood loss: 0 cc. Grafts or implants placed: N/A.  Complete 2D, complete spectral Doppler, and color Doppler.  Study status:  Routine.  Consent:  The risks, benefits, and alternatives to the procedure  were explained. Consent was obtained.  Study completion:  The patient  tolerated the procedure well. There were no  complications.    FINDINGS    BASELINE ECG:   Normal sinus rhythm.  LEFT VENTRICLE:  Well visualized.  Wall thickness is increased.   Hypertrophy  is noted. Systolic function is normal.    The ejection fraction is 65%.    AORTIC VALVE:  3D imaging and post-processing assessment performed.  The valve  is trileaflet. The leaflets are mildly thickened. There is a small echodensity  attached to the NCC on the aortic side. This could represent chronic calcific  changes, but in the setting of bacteremia a small vegetation cannot be  excluded. There is mild regurgitation.    MITRAL VALVE:  There is mild regurgitation.    RIGHT VENTRICLE:  Systolic function is normal.    VENTRICULAR SEPTUM:   Thickness is increased.    PULMONIC VALVE:   There is mild regurgitation.    TRICUSPID VALVE:  There is hypertrophy of the papillary muscle  There is mild regurgitation.    PERICARDIUM:   There is no pericardial effusion.    ------------------------------------------------------------------------------  Measurements    Left ventricle     Value   Ref     2024  EF             (N) 65    % 52 - 72 72  Legend:  (L)  and  (H)  ismael values outside specified reference range.    (N)  marks values inside specified reference range.    Prepared and electronically signed by  Isabelle Fonseca MD  2025 23:24    ___________________________________________________________________________    TRANSTHORACIC ECHO(TTE) COMPLETE W/ W/O IMAGING AGENT    - Narrative -  *[facility]*  [address] [place] [address] [phone]  Transthoracic Echocardiogram (TTE)    Patient: Maikel Storey      Study Date/Time:     Dec 31 2024 2:00PM  MRN:     [id number]                  FIN#:                [id number]  :     1955               Ht/Wt:               182cm 152.9kg  Age:     69                       BSA/BMI:             2.85m^2 46.1kg/m^2  Gender:  M                        Baseline BP:         123 /  71  *Ordering Physician:* Vahid Laureano    *Referring Physician:* Vahid Laureano    *Attending Physician:* Michelle BrookePerforming Physician:* TRINIDAD  *Diagnostic Physician:* Lowell Lobo MD  *Sonographer:* PHILIPP Goodrich    ------------------------------------------------------------------------------  INDICATIONS:   Bacteremia.    ------------------------------------------------------------------------------  STUDY CONCLUSIONS  SUMMARY:    1. Left ventricle: The cavity size is normal. Wall thickness is mildly  increased. Systolic function is hyperdynamic. The ejection fraction was  measured by biplane method of disks. Grade I diastolic dysfunction. The  ejection fraction is 72%.  2. Aortic valve: Valve is not well visualized due to poor windows, thus cannot  comment with regards to vegetation. Clinical correlation is advised.  3. Mitral valve: Valve is not well visualized due to poor windows. Thus cannot  comment with regards to vegetation. Clincal correlation is advised.  4. Left atrium: The atrium is mildly dilated.  5. Right ventricle: The cavity size is normal. Wall thickness is normal.  Systolic function is normal. Systolic pressure is within the normal range.  The estimated peak pressure is likely 33mm Hg. There is poor windows thus  this maybe under represented. Clinical correlation is advised.    ------------------------------------------------------------------------------  STUDY DATA:  Patient room number: 5022. [place]  Procedure:  A  transthoracic echocardiogram was performed. Image quality was suboptimal. The  study was technically limited due to body habitus. Intravenous contrast  (Definity 2ml) was administered to opacify the left ventricle.  M-mode,  complete 2D, complete spectral Doppler, color Doppler, and intravenous  contrast injection.  Study status:  Routine.  Study completion:  There were no  complications.    FINDINGS    BASELINE ECG:   Normal sinus rhythm.  LEFT  VENTRICLE:  The cavity size is normal. Wall thickness is mildly  increased. Systolic function is hyperdynamic. Wall motion is normal; there are  no regional wall motion abnormalities.    The ejection fraction was measured  by biplane method of disks. The ejection fraction is 72%. The tissue Doppler  parameters are abnormal. Grade I diastolic dysfunction.    AORTIC VALVE:  Poorly visualized. The annulus is mildly calcified. The valve  is probably trileaflet. The leaflets are mildly thickened and mildly  calcified. Cusp separation is normal. Velocity is within the normal range.  There is likely no stenosis. There is no regurgitation. Valve is not well  visualized due to poor windows, thus cannot comment with regards to  vegetation. Clinical correlation is advised.    AORTA:  Aortic root: The root is normal-sized.  Ascending aorta: The vessel is normal-sized.    MITRAL VALVE:  Poorly visualized. There is mild regurgitation. The peak  diastolic gradient is 3mm Hg. The valve area by pressure half-time is 2.4cm^2.  The valve area index by pressure half-time is 0.85cm^2/m^2. Valve is not well  visualized due to poor windows. Thus cannot comment with regards to  vegetation. Clincal correlation is advised.    LEFT ATRIUM:  The atrium is mildly dilated.    RIGHT VENTRICLE:  The cavity size is normal. Wall thickness is normal.  Systolic function is normal. Systolic pressure is within the normal range. The  estimated peak pressure is likely 33mm Hg. There is poor windows thus this  maybe under represented. Clinical correlation is advised.       The RV  pressure during systole is 33mm Hg.    PULMONIC VALVE:   Not visualized.    TRICUSPID VALVE:  Poorly visualized. The valve is structurally normal. Leaflet  separation is normal. Inflow velocity is within the normal range. There is no  evidence for stenosis. There is likely mild regurgitation. Valve is not well  visualized due to poor windows. Thus cannot comment with regards  to  vegetation. Clincal correlation is advised.    RIGHT ATRIUM:  Not well visualized.       The estimated central venous  pressure is 8mm Hg.    PERICARDIUM:  A prominent pericardial fat pad is present.    SYSTEMIC VEINS:  Inferior vena cava: The IVC is dilated.  Respirophasic diameter changes are in  the normal range (>= 50%).    ------------------------------------------------------------------------------  Measurements    Left ventricle            Value        Ref       09/28/2023  Right ventricle          Value          Ref       09/28/2023  MIKE, LAX chord        (N) 4.3   cm     4.2 - 5.8 4.2         Pressure, S              33    mm Hg    --------- ----------  ESD, LAX chord        (N) 2.5   cm     2.5 - 4.0 2.7  MIKE/bsa, LAX chord    (L) 1.5   cm/m^2 2.2 - 3.0 1.4         Left atrium              Value          Ref       09/28/2023  ESD/bsa, LAX chord    (L) 0.9   cm/m^2 1.3 - 2.1 0.9         AP dim, ES           (H) 4.4   cm       3.0 - 4.0 4.0  PW, ED, LAX           (H) 1.3   cm     0.6 - 1.0 1.4         AP dim index, ES     (N) 1.5   cm/m^2   1.5 - 2.3 1.4  MIKE major ax, A4C         7.8   cm     --------- 8.4         Area ES, A4C         (H) 26    cm^2     <=20      21  ESD major ax, A4C         6.3   cm     --------- 7.2         Area/bsa ES, A4C         9.14  cm^2/m^2 --------- 6.97  FS major axis, A4C        19    %      --------- 15          Area ES, A2C             29    cm^2     --------- 20  MIKE/bsa major ax, A4C     2.7   cm/m^2 --------- 2.9         Area/bsa ES, A2C         10.26 cm^2/m^2 --------- 6.73  ESD/bsa major ax, A4C     2.2   cm/m^2 --------- 2.4         SI dim, A2C              7.0   cm       --------- ----------  TERESA, A4C                  28.2  cm^2   --------- 26.6        Vol, S               (H) 84    ml       18 - 58   59  DEBBIE, A4C                  13.3  cm^2   --------- 16.6        Vol/bsa, S           (N) 29    ml/m^2   16 - 34   22  FAC, A4C                  53    %       --------- 38          Vol, ES, 1-p A4C     (H) 73    ml       18 - 58   60  IVS, ED               (H) 1.4   cm     0.6 - 1.0 1.3         Vol/bsa, ES, 1-p A4C (N) 26    ml/m^2   12 - 37   20  PW, ED                (H) 1.3   cm     0.6 - 1.0 1.4         Vol, ES, 1-p A2C     (H) 96    ml       18 - 58   53  IVS/PW, ED                1.04         --------- 0.93        Vol/bsa, ES, 1-p A2C (N) 34    ml/m^2   11 - 43   18  EDV                   (N) 77    ml     62 - 150  73          Vol, ES, 2-p             85    ml       --------- 59  ESV                   (L) 15    ml     21 - 61   20          Vol/bsa, ES, 2-p     (N) 30    ml/m^2   16 - 34   20  EF                    (N) 72    %      52 - 72   65  SV                        59    ml     --------- 51          Mitral valve             Value          Ref       09/28/2023  EDV/bsa               (L) 27    ml/m^2 34 - 74   25          Peak E                   0.88  m/sec    --------- 1.13  ESV/bsa               (L) 5     ml/m^2 11 - 31   7           Peak A                   1.26  m/sec    --------- 0.98  SV/bsa                    21    ml/m^2 --------- 17          Decel time               306   ms       --------- 284  SV, 1-p A4C               59    ml     --------- 34          PHT                      90    ms       --------- 83  SV/bsa, 1-p A4C           21    ml/m^2 --------- 12          Peak grad, D             3     mm Hg    --------- 5  EDV, 2-p              (N) 83    ml     62 - 150  68          Peak E/A ratio           0.7            --------- 1.1  ESV, 2-p              (N) 24    ml     21 - 61   33          MVA, PHT                 2.4   cm^2     --------- 2.7  EF, 2-p               (N) 71    %      52 - 72   ----------  MVA/bsa, PHT             0.85  cm^2/m^2 --------- 0.9  SV, 2-p                   59    ml     --------- 35  EDV/bsa, 2-p          (L) 29    ml/m^2 34 - 74   23          Tricuspid valve          Value          Ref       09/28/2023  ESV/bsa, 2-p           (L) 8     ml/m^2 11 - 31   11          TR peak v            (N) 2.5   m/sec    <=2.8     ----------  SV/bsa, 2-p               20.8  ml/m^2 --------- 11.9        Peak RV-RA grad, S       25    mm Hg    --------- ----------  E', lat kay, TDI      (L) 7.4   cm/sec >=10.0    7.4  E/e', lat kay, TDI    (N) 12           <=13      15          Ascending aorta          Value          Ref       2023  E', med kay, TDI      (L) 5.3   cm/sec >=7.0     7.9         AAo AP diam, ED      (N) 3.3   cm       2.2 - 3.8 3.1  E/e', med kay, TDI        17           --------- 14          AAo AP diam/bsa, ED  (N) 1.2   cm/m^2   1.1 - 1.9 1.1  E', avg, TDI              6.365 cm/sec --------- 7.65  E/e', avg, TDI        (N) 14           <=14      15          Pulmonary artery         Value          Ref       2023  Pressure, S              33    mm Hg    --------- ----------  LVOT                      Value        Ref       2023  Diam, S                   2.1   cm     --------- 1.9         Systemic veins           Value          Ref       2023  Area                      3.5   cm^2   --------- 2.8         Estimated CVP            8     mm Hg    --------- ----------    Legend:  (L)  and  (H)  ismael values outside specified reference range.    (N)  marks values inside specified reference range.    Prepared and electronically signed by  Lowell Lobo MD  2024 15:43      === 23 ===    TRANSTHORACIC ECHO(TTE) COMPLETE W/ W/O IMAGING AGENT    - Narrative -  *[facility]*  [address] [place] [address], [address]  [place] [id number]  [phone]  Transthoracic Echocardiogram (TTE)    Patient: Maikel Storey      Study Date/Time:     Sep 28 2023 9:36AM  MRN:     [id number]                  FIN#:                [id number]  :     1955               Ht/Wt:               182.9cm 161kg  Age:     68                       BSA/BMI:             2.94m^2 48.1kg/m^2  Gender:  M                         Baseline BP:         128 / 86  Ordering Physician:     Renzo Pires DO    Referring Physician:    Renzo Pires DO    Primary Physician:      No PCP    Performing Physician:   Renzo Pires DO  Diagnostic Physician:   Renzo Pires DO  Sonographer:            PHILIPP Puentes    ------------------------------------------------------------------------------  INDICATIONS:   Chronic Diastolic Heart Failure, Hypertension.    ------------------------------------------------------------------------------  STUDY CONCLUSIONS  SUMMARY:    1. Left ventricle: The cavity size is normal. Wall thickness is mildly  increased. There is concentric hypertrophy. Systolic function is normal.  The ejection fraction was measured by visual estimation. Doppler parameters  are consistent with abnormal left ventricular relaxation (grade 1 diastolic  dysfunction). The ejection fraction is 65%.  2. Left atrium: The atrium is normal in size.  3. Right ventricle: The cavity size is normal. Wall thickness is normal.  Systolic function is normal.  4. Inferior vena cava: The IVC is dilated. Respirophasic diameter changes are  blunted (< 50%).  5. Technically difficult study.  IMPRESSIONS:   The study shows no significant change since the previous study.    ------------------------------------------------------------------------------  STUDY DATA:  [place] Comparison is made to the study of August 2022.  Procedure:  A transthoracic echocardiogram was performed. The risks and  benefits for the use of an ultrasound enhancing agent were reviewed with the  patient. Image quality was suboptimal. Intravenous contrast (Definity 2ml) was  administered to opacify the left ventricle.  M-mode, complete 2D, complete  spectral Doppler, and color Doppler.  Study status:  Routine.  Study  completion:  There were no complications.    FINDINGS    BASELINE ECG:   Normal sinus rhythm.  LEFT VENTRICLE:  The cavity size is normal. Wall  thickness is mildly  increased. There is concentric hypertrophy. Systolic function is normal. Wall  motion is normal; there are no regional wall motion abnormalities.    The  ejection fraction was measured by visual estimation. The ejection fraction is  65%. Doppler parameters are consistent with abnormal left ventricular  relaxation (grade 1 diastolic dysfunction).    AORTIC VALVE:  The annulus is normal. The valve is probably trileaflet. The  leaflets are normal thickness and mildly calcified. Cusp separation is normal.  Velocity is within the normal range. There is no stenosis. There is no  regurgitation. The peak systolic gradient is 11mm Hg. The ratio of LVOT to  aortic valve peak velocity is 0.55. The valve area is 1.6cm^2. The valve area  index is 0.53cm^2/m^2.    AORTA:  Aortic root: The root is normal-sized.  Ascending aorta: The vessel is normal-sized.    MITRAL VALVE:  The annulus is mildly calcified. The leaflets are normal  thickness. Leaflet separation is normal. Inflow velocity is within the normal  range. There is no evidence for stenosis. There is no regurgitation. The peak  diastolic gradient is 5mm Hg. The valve area by pressure half-time is 2.7cm^2.  The valve area index by pressure half-time is 0.9cm^2/m^2.    LEFT ATRIUM:  The atrium is normal in size.    RIGHT VENTRICLE:  The cavity size is normal. Wall thickness is normal.  Systolic function is normal.    VENTRICULAR SEPTUM:   Thickness is mildly increased.    PULMONIC VALVE:   The valve is structurally normal. Cusp separation is normal.  Velocity is within the normal range. There is no regurgitation.    TRICUSPID VALVE:  The valve is structurally normal. Leaflet separation is  normal. Inflow velocity is within the normal range. There is trivial  regurgitation.    RIGHT ATRIUM:  The atrium is normal in size.    PERICARDIUM:  The pericardium is normal in appearance.    SYSTEMIC VEINS:  Inferior vena cava: The IVC is dilated.  Respirophasic  diameter changes are  blunted (< 50%).    ------------------------------------------------------------------------------  Measurements    Left ventricle            Value        Ref       08/19/2022  Right ventricle            Value          Ref        08/19/2022  MIKE, LAX chord        (N) 4.2   cm     4.2 - 5.8 ----------  MIKE, LAX                   2.6   cm       ---------- ----------  ESD, LAX chord        (N) 2.7   cm     2.5 - 4.0 ----------  TAPSE, MM              (N) 2.7   cm       >=1.7      ----------  MIKE/bsa, LAX chord    (L) 1.4   cm/m^2 2.2 - 3.0 ----------  S' lateral             (H) 18.2  cm/sec   6.0 - 13.4 ----------  ESD/bsa, LAX chord    (L) 0.9   cm/m^2 1.3 - 2.1 ----------  PW, ED, LAX           (H) 1.4   cm     0.6 - 1.0 ----------  Left atrium                Value          Ref        08/19/2022  MIKE major ax, A4C         8.4   cm     --------- ----------  AP dim, ES             (N) 4.0   cm       3.0 - 4.0  3.7  ESD major ax, A4C         7.2   cm     --------- ----------  AP dim index, ES       (L) 1.4   cm/m^2   1.5 - 2.3  ----------  FS major axis, A4C        15    %      --------- ----------  Area ES, A4C           (H) 21    cm^2     <=20       ----------  MIKE/bsa major ax, A4C     2.9   cm/m^2 --------- ----------  Area/bsa ES, A4C           6.97  cm^2/m^2 ---------- ----------  ESD/bsa major ax, A4C     2.4   cm/m^2 --------- ----------  Area ES, A2C               20    cm^2     ---------- ----------  TERESA, A4C                  26.6  cm^2   --------- ----------  Area/bsa ES, A2C           6.73  cm^2/m^2 ---------- ----------  DEBBIE, A4C                  16.6  cm^2   --------- ----------  Vol, S                 (H) 59    ml       18 - 58    ----------  FAC, A4C                  38    %      --------- ----------  Vol/bsa, S             (N) 22    ml/m^2   16 - 34    ----------  IVS, ED               (H) 1.3   cm     0.6 - 1.0 ----------  Vol, ES, 1-p A4C       (H) 60    ml       18 - 58     ----------  PW, ED                (H) 1.4   cm     0.6 - 1.0 ----------  Vol/bsa, ES, 1-p A4C   (N) 20    ml/m^2   12 - 37    ----------  IVS/PW, ED                0.93         --------- ----------  Vol, ES, 1-p A2C       (N) 53    ml       18 - 58    ----------  EDV                   (N) 73    ml     62 - 150  ----------  Vol/bsa, ES, 1-p A2C   (N) 18    ml/m^2   11 - 43    ----------  ESV                   (L) 20    ml     21 - 61   ----------  Vol, ES, 2-p               59    ml       ---------- ----------  EF                    (N) 65    %      52 - 72   60          Vol/bsa, ES, 2-p       (N) 20    ml/m^2   16 - 34    ----------  SV                        51    ml     --------- 64  EDV/bsa               (L) 25    ml/m^2 34 - 74   ----------  Aortic valve               Value          Ref        08/19/2022  ESV/bsa               (L) 7     ml/m^2 11 - 31   ----------  Peak v, S                  1.6   m/sec    ---------- ----------  SV/bsa                    17    ml/m^2 --------- 24          Peak grad, S               11    mm Hg    ---------- 17  ESV, 1-p A4C          (N) 34    ml     22 - 78   ----------  LVOT/AV, Vpeak ratio       0.55           ---------- ----------  SV, 1-p A4C               34    ml     --------- ----------  SARWAT, Vmax                  1.6   cm^2     ---------- 2.1  ESV/bsa, 1-p A4C      (N) 12    ml/m^2 12 - 40   ----------  SARWAT/bsa, Vmax              0.53  cm^2/m^2 ---------- 0.76  SV/bsa, 1-p A4C           12    ml/m^2 --------- ----------  EDV, 2-p              (N) 68    ml     62 - 150  ----------  Mitral valve               Value          Ref        08/19/2022  ESV, 2-p              (N) 33    ml     21 - 61   ----------  Peak E                     1.13  m/sec    ---------- 0.94  SV, 2-p                   35    ml     --------- ----------  Peak A                     0.98  m/sec    ---------- 1.16  EDV/bsa, 2-p          (L) 23    ml/m^2 34 - 74   ----------  Decel time                 284    ms       ---------- 232  ESV/bsa, 2-p          (N) 11    ml/m^2 11 - 31   ----------  PHT                        83    ms       ---------- 68  SV/bsa, 2-p               11.9  ml/m^2 --------- ----------  Peak grad, D               5     mm Hg    ---------- 4  E', lat kay TDI      (L) 7.4   cm/sec >=10.0    6.4         Peak E/A ratio             1.1            ---------- ----------  E/e', lat kay TDI    (H) 15           <=13      ----------  MVA, PHT                   2.7   cm^2     ---------- 3.2  E', med kay TDI      (N) 7.9   cm/sec >=7.0     5.8         MVA/bsa, PHT               0.9   cm^2/m^2 ---------- 1.2  E/e', med kay, TDI        14           --------- ----------  E', avparveen, TDI              7.65  cm/sec --------- ----------  Aortic root                Value          Ref        08/19/2022  E/e', avg, TDI        (H) 15           <=14      ----------  Root diam              (L) 3.2   cm       3.4 - 4.8  ----------  Root diam/bsa              1.1   cm/m^2   ---------- ----------  LVOT                      Value        Ref       08/19/2022  Root diam, ED          (L) 3.2   cm       3.4 - 4.8  ----------  Diam, S                   1.9   cm     --------- ----------  S-T junct diam, ED     (N) 2.5   cm       2.3 - 3.5  ----------  Area                      2.8   cm^2   --------- 3.1         S-T junct diam/bsa, ED (L) 0.9   cm/m^2   1.1 - 1.9  ----------  Peak yesenia, S               0.9   m/sec  --------- 1.36  Peak grad, S              3     mm Hg  --------- 7           Ascending aorta            Value          Ref        08/19/2022  AAo AP diam, ED        (N) 3.1   cm       2.2 - 3.8  ----------  AAo AP diam/bsa, ED    (N) 1.1   cm/m^2   1.1 - 1.9  ----------  Legend:  (L)  and  (H)  ismael values outside specified reference range.    (N)  marks values inside specified reference range.    Prepared and electronically signed by  Renzo Pires DO  09/28/2023 11:38  LAST MRI:  === 12/26/24 ===    MRI FOOT RIGHT WO  CONTRAST    - Narrative -  EXAM: MRI FOOT RIGHT WO CONTRAST    CLINICAL INDICATION: Chronic osteomyelitis fifth metatarsal right foot.    COMPARISON: Right foot radiographs dated November 21, 2024.    TECHNIQUE: MRI right foot without contrast from the level of the midfoot to  distal forefoot.    FINDINGS:    Motion-degraded examination. There is soft tissue wound ulceration  involving the lateral forefoot. This extends to the region of the fifth  ray. There is erosive destructive changes of the fifth metatarsal. There is  subluxation of the fifth ray MTP joint. Scattered degenerative changes.  Evaluation for soft tissue abscess is limited due to the lack of IV  contrast. Given the limitation, there appears loculated collection in the  region of the soft tissue wound involving the lateral forefoot which  measure approximately 2.5 cm in aggregate. There is subcutaneous edematous  thickening in the included field-of-view. There is edema-like signal  involving the intrinsic muscles of the included foot which may reactive or  related to denervation.    - Impression -  Motion-degraded examination.  1. Soft tissue wound ulceration involving the lateral forefoot.  Osteomyelitis of the fifth metatarsal. Evaluation for soft tissue abscess  is limited due to the lack of IV contrast. Given this limitation, there  appears to be loculated collection in the region of the soft tissue wound  involving the lateral forefoot measuring approximately 2.5 cm suspicious  for infection/abscess. These findings are new/progressed in comparison to  prior MRI right foot examination dated October 12, 2022.  2. Cellulitis.  3. Subluxation of the fifth ray MTP joint.  4. Additional findings as described above.    Electronically Signed by: ALFONSO ARCHER M.D.  Signed on: 12/26/2024 3:39 PM  Workstation ID: [id number]      === 10/12/22 ===    MRI FOOT RIGHT WO CONTRAST    - Narrative -  EXAM: MRI FOOT RIGHT WO CONTRAST    CLINICAL INDICATION:  Osteomyelitis.    COMPARISON:  Selected images from prior MRI right foot examination dated  July 15, 2022.    TECHNIQUE: MRI right foot was performed without contrast the level of the  forefoot.    FINDINGS:    There is soft tissue wound ulceration overlying the plantar lateral aspect  of the forefoot at the level of the fifth distal metatarsal. This soft  tissue ulceration extends towards the adjacent metatarsal where there is  persistent abnormal marrow signal changes of the right fifth metatarsal  head and neck as well as the base of the fifth toe proximal phalanx,  suspicious for osteomyelitis with osteitis as described previously.  Overall, there is no significant interval progression of the osteomyelitis  of the fifth metatarsal. There is a joint effusion of the fifth toe MTP  joint which extends towards the adjacent soft tissue wound ulceration,  underlying abscess/phlegmon is difficult to exclude. This is similar to  prior. Multifocal degenerative changes are seen. There is nonspecific  thickening extending along the fascial planes of the foot which may be  reactive or related to cellulitis. There is atrophy and edema edema-like  signal is again seen within the intrinsic musculature of the foot.  Unchanged ganglion seen along the plantar aspect of the forefoot. No acute  fracture line or malalignment identified.    - Impression -  1. Overall, no significant interval progression of osteomyelitis involving  the fifth distal metatarsal with adjacent soft tissue wound ulceration and  possible phlegmon/abscess.  2. Additional findings as described above.    Electronically Signed by: ALFONSO ARCHER M.D.  Signed on: 10/12/2022 5:57 PM      === 07/15/22 ===    MRI FOOT RIGHT WO CONTRAST    - Narrative -  EXAM:  MRI FOOT RIGHT WO CONTRAST    INDICATION:  Osteomyelitis, foot  History of osteomyelitis fifth metatarsal head right foot.  MRI evaluation  to see if osteomyelitis still active following nonsurgical  treatment..    TECHNIQUE:  Multiplanar, multisequence MRI of the right forefoot was  obtained without the administration of intravenous contrast.  Specific  sequences include 3-plane localizer (repeated), short axis T1 and  fat-saturated T2, sagittal T1 and STIR, and coronal T1 (repeated) and STIR  sequences    COMPARISON:  Radiographs dated 7/7/2022 and prior MRI dated 9/28/2021    FINDINGS:    Motion artifact significantly degrades assessment of all sequences. Within  this limitation, observations are as follows:    There is progression of confluent loss of T1 signal involving the 5th  metatarsal distally. Edema-like signal is seen at the 5th toe proximal  phalangeal base without confluent loss of T1 signal. Possible erosion is  seen at the base of the 5th toe proximal phalanx, however.    There appears to be 5th metatarsophalangeal joint effusion, however this  fluid is crescentic coursing around the 5th metatarsal head at the 5th  metatarsophalangeal joint level which appears very similar compared to the  prior exam.    There is subcutaneous edema.    Fatty atrophy and edema-like signal of the intrinsic musculature of the  foot is seen. There is fatty atrophy of the abductor digit minimi, which  can be seen with Fabian's neuropathy.    Mild posterior tibial and flexor digitorum longus tenosynovitis is seen.  There is minimal fluid at the master knot of Louis.    Ganglion at the plantar aspect of the foot measures 0.8 cm x 0.7 cm.    Talonavicular joint effusion is partially included on the field-of-view.    The medial hallux sesamoid is bipartite.    Multifocal osteoarthritis of the visible midfoot and forefoot is seen.    - Impression -  1. Progression of 5th metatarsal head and neck osteomyelitis with osteitis  of the 5th toe proximal phalanx.  2. 5th metatarsophalangeal joint effusion appears in continuity with  crescentic collection which may be abscess predominantly dorsal lateral to  the 5th metatarsal  neck.  3. Multifocal osteoarthritis.  4. Significantly motion-degraded exam.  5. Please see above for additional observations.    Electronically Signed by: ERIC FELIPE M.D.  Signed on: 7/15/2022 4:35 PM  LAST CT:  No results found for this or any previous visit.  LAST EKG:    Encounter Date: 12/28/24   Electrocardiogram 12-Lead   Result Value    Ventricular Rate EKG/Min (BPM) 82    Atrial Rate (BPM) 82    GA-Interval (MSEC) 258    QRS-Interval (MSEC) 82    QT-Interval (MSEC) 398    QTc 465    P Axis (Degrees) 17    R Axis (Degrees) 43    T Axis (Degrees) 175    REPORT TEXT      Sinus rhythm  with 1st degree AV block  ST And  T wave abnormality, consider lateral ischemia  Prolonged QT  Abnormal ECG  When compared with ECG of  20-AUG-2022 10:42,  T wave inversion more evident in  Lateral leads  Confirmed by DIVINA DE JESUS DO ([id number]) on 1/1/2025 8:09:42 PM       LAST X-RAY:  === 12/28/24 ===    XR KNEE 3 VIEWS RIGHT    - Narrative -  EXAM: XR KNEE 3 VIEWS RIGHT    CLINICAL INDICATION: Right knee pain.    COMPARISON: None.    FINDINGS:    3 views were obtained. There is no evidence of acute fracture or  dislocation. There are significant tricompartmental degenerative changes  with joint space narrowing and spurring. There is a large suprapatellar  joint effusion. Vascular calcifications are present.    - Impression -  Significant tricompartmental degenerative changes with large joint  effusion.          Electronically Signed by: ANNA ESTRADA M.D.  Signed on: 1/2/2025 4:49 PM  Workstation ID: [id number]    ___________________________________________________________________________    XR FOOT 3 OR MORE VIEWS RIGHT    - Narrative -  EXAM: XR FOOT 3 OR MORE VIEWS RIGHT    CLINICAL INDICATION: Postoperative    COMPARISON: 11/21/2024    FINDINGS: Postoperative change with amputation through the fifth metatarsal  only a small portion of the metatarsal remains. Postoperative changes soft  tissues.    - Impression  -  As above.          Electronically Signed by: BERTRAM OSEI M.D.  Signed on: 1/2/2025 10:07 AM  Workstation ID: [id number]      === 11/21/24 ===    XR FOOT 3 OR MORE VIEWS RIGHT    - Narrative -  EXAM: XR FOOT 3 OR MORE VIEWS RIGHT    CLINICAL INDICATION: Chronic osteomyelitis first metatarsal. New ulceration  plantar aspect of the foot in the region of the fifth metatarsal head.    COMPARISON: Right foot radiography 5/28/2024    FINDINGS:    There is a soft tissue ulceration plantar aspect of the foot eccentric  laterally in the region of the distal aspect fifth metatarsal. There is  more poor definition of the cortical margins of the distal aspect fifth  metatarsal when compared to 5/28/2024 which could be secondary to  osteomyelitis. Further evaluation with MRI recommended. There is  osteopenia. No fracture. Resection of the distal aspect of the fifth  metatarsal. Scattered osteoarthritic changes mild to moderate in degree  within the interphalangeal joints. There is severe osteoarthritis of the  first tarsometatarsal joint and at least moderate degree of the second,  third, and fourth tarsometatarsal joints. Vascular calcifications  suggesting chronic changes of underlying chronic kidney disease.  Osteopenia. No dislocation.    - Impression -  1.   Soft tissue ulceration plantar aspect of the foot eccentric laterally  in the region of the distal aspect fifth metatarsal.  2.   More poor definition of the cortical margins of the distal aspect  fifth metatarsal when compared to 5/28/2024 which could be secondary to  osteomyelitis. Further evaluation with MRI recommended.  3.   Osteopenia.  4.   Osteoarthritis.  5.   Other findings as above.        Electronically Signed by: BINDU PATTON M.D.  Signed on: 11/21/2024 10:27 AM  Workstation ID: [id number]  LAST U/S:  === 07/01/21 ===    US VASC SCROTUM/TESTICLES DUPLEX    - Narrative -  EXAM: ULTRASOUND SCROTUM/TESTICLES    CLINICAL INDICATION: Scrotal  swelling.    COMPARISON: No comparison was made.    TECHNIQUE: Transabdominal grayscale and color Doppler images were obtained  of the scrotum and testicles.    FINDINGS:    The right testicle measures approximately 4.3 x 2.7 x 2.8 cm for total  volume of approximately 17 mL.  The left testicle measures 4.2 x 2.6 x 3.0  cm for total volume of approximately 17 mL.  Bilateral symmetric vascular  flow is demonstrated to the testicles.  The testicles demonstrate  homogeneous echotexture and echogenicity.  No abnormal testicular masses.  No abnormal increased vascularity involving the epididymis bilaterally.  There is a cyst in the right epididymal tail measuring approximately 9 mm.  A left epididymal appendage is seen.  Small bilateral hydroceles.  No  definite varicoceles.  Abnormal scrotal wall thickening which is  nonspecific.    - Impression -  1.  No sonographic evidence of active testicular torsion.  2.  Abnormal nonspecific scrotal wall thickening which may be  infectious/inflammatory.  3.  Small bilateral hydroceles.  4.  Right epididymal cyst.  5.  Additional findings as described above.    Electronically Signed by: ALFONSO ARCHER M.D.  Signed on: 7/1/2021 10:08 AM      === 04/13/21 ===    US KIDNEY(S) AND BLADDER URINARY TRACT    - Narrative -  EXAM: US KIDNEY(S) AND BLADDER URINARY TRACT    CLINICAL INDICATION: Acute kidney injury.    COMPARISON: Selected images from CT dated 5/15/2020    FINDINGS:    Right kidney measures 11.0 cm in length. Left kidney measures 12.6 cm in  length. Renal cortical echogenicity is unremarkable. Cortical thickness is  maintained. No collecting system dilatation is seen to suggest  hydronephrosis. No shadowing stones are identified. There are no  perinephric fluid collections.    Trace amount of fluid or stranding noted adjacent to the kidneys  bilaterally, likely representing \"renal sweat\". This can be seen in setting  of renal sufficiency.    - Impression -  1. No  hydronephrosis.      Electronically Signed by: ANNA ESTRADA M.D.  Signed on: 4/16/2021 7:51 AM      Pathology    Order Name Source Comment Collection Info Order Time   SURGICAL PATHOLOGY Foot, Right  Collected By: Ken Graff DPM 1/1/2025  9:05 AM     How should test results be released to the patient's [facility] portal?   Automatic Release          Is Add-on Testing being Requested on an Existing Surgical Specimen?   No            Test Results Pending At Discharge  Pending Labs       Order Current Status    FUNGAL CULTURE AND SMEAR Preliminary result    MYCOBACTERIAL CULTURE AND ACID FAST SMEAR Preliminary result            Pertinent Physical Exam At Time of Discharge  Physical Exam  Visit Vitals  BP (!) 143/79 (BP Location: RUE - Right upper extremity, Patient Position: Sitting)   Pulse (!) 56   Temp 98.1 °F (36.7 °C) (Oral)   Resp 16   Ht 6' (1.829 m)   Wt (!) 143.9 kg (317 lb 3.9 oz)   SpO2 97%   BMI 43.03 kg/m²     Physical Exam  Vitals and nursing note reviewed.   Constitutional:       Appearance: Normal appearance.   HENT:      Head: Normocephalic and atraumatic.      Nose: Nose normal.      Mouth/Throat:      Mouth: Mucous membranes are moist.      Neck: Normal range of motion and neck supple.   Eyes:      Conjunctiva/sclera: Conjunctivae normal.   Cardiovascular:      Rate and Rhythm: Normal rate and regular rhythm.      Pulses: Normal pulses.      Heart sounds: Normal heart sounds.   Pulmonary:      Effort: Pulmonary effort is normal.      Breath sounds: Normal breath sounds.   Abdominal:      General: Abdomen is flat.      Palpations: Abdomen is soft.   Musculoskeletal:         General: Normal range of motion.   Skin:     General: Skin is warm.      Capillary Refill: Capillary refill takes less than 2 seconds.   Neurological:      General: No focal deficit present.      Mental Status: pt is alert.   Psychiatric:         Mood and Affect: Mood normal.     Discharge Diagnosis  Osteomyelitis (CMD)      Plan    Patient Active Problem List   Diagnosis    Benign essential hypertension    Obesity    Knee pain, chronic    Counseling regarding goals of care    Hypercholesterolemia    Type 2 diabetes mellitus with hyperglycemia  (CMD)    Dyspnea on exertion    Anasarca    Chronic diastolic heart failure  (CMD)    CKD (chronic kidney disease)    Severe sepsis (CMD)    Acute kidney injury (TYRELL) with acute tubular necrosis (ATN) (CMD)    Peripheral vascular disease due to secondary diabetes mellitus  (CMD)    Status post amputation of lesser toe, left  (CMD)    Sepsis  (CMD)    Status post amputation of toe  (CMD)    TYRELL (acute kidney injury) (CMD)    Type 2 diabetes mellitus with skin complication, with long-term current use of insulin (CMD)    PVD (peripheral vascular disease) (CMD)    Hyperlipidemia    Debility    Diabetes mellitus with insulin therapy  (CMD)    Constipation    Acute on chronic systolic congestive heart failure  (CMD)    Chronic kidney disease, stage III (moderate)  (CMD)    FELIPE (obstructive sleep apnea)    Morbid obesity with BMI of 40.0-44.9, adult  (CMD)    Anemia    Elevated sed rate    PVOD (pulmonary veno-occlusive disease)  (CMD)    Osteomyelitis of left foot  (CMD)    Gangrene of left foot  (CMD)    Scrotal edema    Leg edema    Elevated brain natriuretic peptide (BNP) level    Troponin level elevated    Open wound of left foot    Osteomyelitis of right foot  (CMD)    Type 2 diabetes mellitus with right diabetic foot ulcer (CMD)    Pneumonia due to COVID-19 virus    Acute respiratory failure with hypoxia  (CMD)    Hyponatremia    Hypoxia    Essential hypertension    Hyperlipidemia    Anemia    History of MRSA infection    Hypokalemia    Preoperative cardiovascular examination    Osteomyelitis (CMD)       Discharge Instructions      Discharge Medications       Summary of your Discharge Medications        Take these Medications        Details   allopurinol 100 MG tablet  Commonly known as:  ZYLOPRIM   TAKE 1 TABLET BY MOUTH EVERY DAY     atorvastatin 20 MG tablet  Commonly known as: LIPITOR   Take 20 mg by mouth daily. Unsure of dosage     empagliflozin 10 MG tablet  Commonly known as: JARDIANCE   Take 1 tablet by mouth daily (before breakfast).     famotidine 20 MG tablet  Commonly known as: PEPCID   TAKE 1 TABLET BY MOUTH DAILY     insulin aspart 100 UNIT/ML injectable solution  Commonly known as: NovoLOG   Inject 20 Units into the skin in the morning and 20 Units at noon and 20 Units in the evening. Inject before meals. W/sliding scale     insulin glargine 100 UNIT/ML vial solution  Commonly known as: LANTUS   Inject 50 Units into the skin in the morning and 50 Units in the evening.     isosorbide mononitrate 30 MG 24 hr tablet  Commonly known as: IMDUR   TAKE 1 TABLET BY MOUTH EVERY DAY     metoPROLOL succinate 25 MG 24 hr tablet  Commonly known as: TOPROL-XL   Take 1 tablet by mouth daily.     mupirocin 2 % ointment  Commonly known as: BACTROBAN   Apply 1 application. topically in the morning and 1 application. in the evening.     oxacillin 2 g injection   Inject 12,000 mg into the vein every 24 hours. Every 24 hour regimen infuses OVER 24 hours with 24 hour device changes. Administer via elastomeric or ambulatory pump.     spironolactone 25 MG tablet  Commonly known as: ALDACTONE   Take 1 tablet by mouth 2 times daily.     torsemide 20 MG tablet  Commonly known as: DEMADEX   Take 3 tablets by mouth 2 times daily.              Smoking Cessation  Counseling given: Not Answered  Tobacco comments: quit smoking 10-12 years ago      Primary Care Physician  Eladia Cheek MD       Yes

## 2025-05-05 ENCOUNTER — RESULT REVIEW (OUTPATIENT)
Age: 80
End: 2025-05-05

## 2025-08-07 ENCOUNTER — APPOINTMENT (OUTPATIENT)
Dept: ULTRASOUND IMAGING | Facility: CLINIC | Age: 80
End: 2025-08-07
Payer: MEDICARE

## 2025-08-07 PROCEDURE — 93970 EXTREMITY STUDY: CPT

## (undated) DEVICE — TUBING ALARIS PUMP MODULE NON-DEHP

## (undated) DEVICE — UNDERPAD LINEN SAVER 23 X 36"

## (undated) DEVICE — FORCEP RADIAL JAW 4 240CM DISP

## (undated) DEVICE — DRSG 2X2

## (undated) DEVICE — DENTURE CUP PINK

## (undated) DEVICE — CATH IV SAFE BC 22G X 1" (BLUE)

## (undated) DEVICE — FORCEP RADIAL JAW 4 W NDL 2.4MM 2.8MM 240CM ORANGE DISP

## (undated) DEVICE — SENSOR O2 FINGER ADULT

## (undated) DEVICE — SOL IRR BAG H2O 1000ML

## (undated) DEVICE — SOL BAG NS 0.9% 1000ML

## (undated) DEVICE — PACK IV START WITH CHG

## (undated) DEVICE — GOWN IMPERV XL

## (undated) DEVICE — DRSG CURITY GAUZE SPONGE 4 X 4" 12-PLY NON-STERILE

## (undated) DEVICE — SYR LUER SLIP TIP 50CC

## (undated) DEVICE — TUBING IV EXTENSION MACRO W CLAVE 7"

## (undated) DEVICE — MASK PROC EAR LOOP

## (undated) DEVICE — SYR SLIP 10CC

## (undated) DEVICE — SYR IV FLUSH SALINE 10ML 30/TY